# Patient Record
Sex: FEMALE | Race: WHITE | Employment: OTHER | URBAN - METROPOLITAN AREA
[De-identification: names, ages, dates, MRNs, and addresses within clinical notes are randomized per-mention and may not be internally consistent; named-entity substitution may affect disease eponyms.]

---

## 2017-02-07 DIAGNOSIS — D45 POLYCYTHEMIA VERA (HCC): ICD-10-CM

## 2017-04-05 DIAGNOSIS — D75.1 POLYCYTHEMIA: ICD-10-CM

## 2017-04-26 ENCOUNTER — OFFICE VISIT (OUTPATIENT)
Dept: ONCOLOGY | Age: 67
End: 2017-04-26
Payer: MEDICARE

## 2017-04-26 VITALS
HEART RATE: 71 BPM | WEIGHT: 218 LBS | RESPIRATION RATE: 16 BRPM | TEMPERATURE: 99 F | SYSTOLIC BLOOD PRESSURE: 156 MMHG | BODY MASS INDEX: 31.21 KG/M2 | DIASTOLIC BLOOD PRESSURE: 83 MMHG | HEIGHT: 70 IN

## 2017-04-26 DIAGNOSIS — D75.1 POLYCYTHEMIA: Primary | ICD-10-CM

## 2017-04-26 DIAGNOSIS — I27.20 PULMONARY HYPERTENSION (HCC): ICD-10-CM

## 2017-04-26 PROCEDURE — G8427 DOCREV CUR MEDS BY ELIG CLIN: HCPCS | Performed by: INTERNAL MEDICINE

## 2017-04-26 PROCEDURE — G8417 CALC BMI ABV UP PARAM F/U: HCPCS | Performed by: INTERNAL MEDICINE

## 2017-04-26 PROCEDURE — 4040F PNEUMOC VAC/ADMIN/RCVD: CPT | Performed by: INTERNAL MEDICINE

## 2017-04-26 PROCEDURE — 1036F TOBACCO NON-USER: CPT | Performed by: INTERNAL MEDICINE

## 2017-04-26 PROCEDURE — G8400 PT W/DXA NO RESULTS DOC: HCPCS | Performed by: INTERNAL MEDICINE

## 2017-04-26 PROCEDURE — 3017F COLORECTAL CA SCREEN DOC REV: CPT | Performed by: INTERNAL MEDICINE

## 2017-04-26 PROCEDURE — 99214 OFFICE O/P EST MOD 30 MIN: CPT | Performed by: INTERNAL MEDICINE

## 2017-04-26 PROCEDURE — 3014F SCREEN MAMMO DOC REV: CPT | Performed by: INTERNAL MEDICINE

## 2017-04-26 PROCEDURE — 1090F PRES/ABSN URINE INCON ASSESS: CPT | Performed by: INTERNAL MEDICINE

## 2017-04-26 PROCEDURE — 1123F ACP DISCUSS/DSCN MKR DOCD: CPT | Performed by: INTERNAL MEDICINE

## 2017-04-28 ENCOUNTER — HOSPITAL ENCOUNTER (OUTPATIENT)
Dept: BONE DENSITY | Age: 67
Discharge: HOME | End: 2017-04-28
Payer: MEDICARE

## 2017-04-28 DIAGNOSIS — Z78.0 POST-MENOPAUSAL: ICD-10-CM

## 2017-04-28 PROCEDURE — 77080 DXA BONE DENSITY AXIAL: CPT

## 2017-05-25 DIAGNOSIS — D45 POLYCYTHEMIA VERA (HCC): Primary | ICD-10-CM

## 2017-06-01 ENCOUNTER — HOSPITAL ENCOUNTER (OUTPATIENT)
Dept: INFUSION THERAPY | Age: 67
Discharge: HOME | End: 2017-06-01
Payer: MEDICARE

## 2017-06-01 VITALS
HEART RATE: 69 BPM | DIASTOLIC BLOOD PRESSURE: 76 MMHG | RESPIRATION RATE: 20 BRPM | TEMPERATURE: 98.4 F | SYSTOLIC BLOOD PRESSURE: 138 MMHG

## 2017-06-01 DIAGNOSIS — D45 POLYCYTHEMIA VERA (HCC): ICD-10-CM

## 2017-06-01 DIAGNOSIS — D75.1 POLYCYTHEMIA: Primary | ICD-10-CM

## 2017-06-01 PROCEDURE — 99195 PHLEBOTOMY: CPT

## 2017-06-01 NOTE — PROGRESS NOTES
1308 States requested to have phlebotomy performed due to symptoms of fatigue and headaches that she only gets when \"my blood is getting too thick\". Phlebotomy initiated with 16 gauge 1\" Gate2Play blood drawing kit per right antecubital vein. Water provided. Nurse remains with patient. 1319 500 ml blood collected. Needle removed. Pressure, then folded 2X2, paper tape and Co-Flex to site. No edema, redness, tenderness or drainage at site. Denies dizziness/lightheadedness or other discomfort. 1337 Up to bathroom to void. Stands without dizziness or lightheadedness. Gait steady. Consumed 3 glasses water while here. 1343 Instructed to drink extra fluids today. Understanding voiced. 1345 Released, ambulatory, in stable condition. FOZIA Harper RN

## 2017-06-01 NOTE — PLAN OF CARE
Problem: Fluid Volume - Risk of, Imbalanced  Goal: Other  To tolerate the phlebotomy with no signs or symptoms of fluid imbalance noted   Outcome: Met This Shift

## 2017-06-13 DIAGNOSIS — D75.1 POLYCYTHEMIA: ICD-10-CM

## 2017-06-30 ENCOUNTER — HOSPITAL ENCOUNTER (OUTPATIENT)
Dept: WOMENS IMAGING | Age: 67
Discharge: HOME | End: 2017-06-30
Payer: MEDICARE

## 2017-06-30 DIAGNOSIS — Z12.31 SCREENING MAMMOGRAM, ENCOUNTER FOR: ICD-10-CM

## 2017-06-30 PROCEDURE — G0202 SCR MAMMO BI INCL CAD: HCPCS

## 2017-07-25 DIAGNOSIS — D75.1 POLYCYTHEMIA: ICD-10-CM

## 2017-09-19 DIAGNOSIS — D75.1 POLYCYTHEMIA: ICD-10-CM

## 2017-10-16 ENCOUNTER — OFFICE VISIT (OUTPATIENT)
Dept: CARDIOLOGY | Age: 67
End: 2017-10-16
Payer: MEDICARE

## 2017-10-16 VITALS
WEIGHT: 221 LBS | HEART RATE: 76 BPM | BODY MASS INDEX: 31.64 KG/M2 | OXYGEN SATURATION: 97 % | SYSTOLIC BLOOD PRESSURE: 135 MMHG | RESPIRATION RATE: 18 BRPM | DIASTOLIC BLOOD PRESSURE: 77 MMHG | HEIGHT: 70 IN

## 2017-10-16 DIAGNOSIS — D75.1 POLYCYTHEMIA: ICD-10-CM

## 2017-10-16 DIAGNOSIS — I10 ESSENTIAL HYPERTENSION: ICD-10-CM

## 2017-10-16 DIAGNOSIS — I27.20 PULMONARY HYPERTENSION (HCC): ICD-10-CM

## 2017-10-16 DIAGNOSIS — R00.2 HEART PALPITATIONS: Primary | ICD-10-CM

## 2017-10-16 PROCEDURE — 4040F PNEUMOC VAC/ADMIN/RCVD: CPT | Performed by: FAMILY MEDICINE

## 2017-10-16 PROCEDURE — 3017F COLORECTAL CA SCREEN DOC REV: CPT | Performed by: FAMILY MEDICINE

## 2017-10-16 PROCEDURE — 1036F TOBACCO NON-USER: CPT | Performed by: FAMILY MEDICINE

## 2017-10-16 PROCEDURE — 99213 OFFICE O/P EST LOW 20 MIN: CPT | Performed by: FAMILY MEDICINE

## 2017-10-16 PROCEDURE — G8399 PT W/DXA RESULTS DOCUMENT: HCPCS | Performed by: FAMILY MEDICINE

## 2017-10-16 PROCEDURE — 3014F SCREEN MAMMO DOC REV: CPT | Performed by: FAMILY MEDICINE

## 2017-10-16 PROCEDURE — G8484 FLU IMMUNIZE NO ADMIN: HCPCS | Performed by: FAMILY MEDICINE

## 2017-10-16 PROCEDURE — 1123F ACP DISCUSS/DSCN MKR DOCD: CPT | Performed by: FAMILY MEDICINE

## 2017-10-16 PROCEDURE — 1090F PRES/ABSN URINE INCON ASSESS: CPT | Performed by: FAMILY MEDICINE

## 2017-10-16 PROCEDURE — G8427 DOCREV CUR MEDS BY ELIG CLIN: HCPCS | Performed by: FAMILY MEDICINE

## 2017-10-16 PROCEDURE — G8417 CALC BMI ABV UP PARAM F/U: HCPCS | Performed by: FAMILY MEDICINE

## 2017-10-16 PROCEDURE — 93000 ELECTROCARDIOGRAM COMPLETE: CPT | Performed by: FAMILY MEDICINE

## 2017-10-16 NOTE — PROGRESS NOTES
without mention of complication, not stated as uncontrolled     Undiagnosed cardiac murmurs     Unspecified essential hypertension     Unspecified hypothyroidism        CURRENT ALLERGIES: Seasonal and Tape [adhesive tape] REVIEW OF SYSTEMS: 10 systems were reviewed. Pertinent positives and negatives as above, all else negative.      Past Surgical History:   Procedure Laterality Date    CHOLECYSTECTOMY      1976    COLONOSCOPY  10/10/2016    -diverticulosis,hemorrhoids    HYSTERECTOMY      2002    LEG SURGERY      left and right laser surgery    Social History:  Social History   Substance Use Topics    Smoking status: Never Smoker    Smokeless tobacco: Not on file    Alcohol use Not on file        CURRENT MEDICATIONS:  Outpatient Prescriptions Marked as Taking for the 10/16/17 encounter (Office Visit) with Sherryle Hoit, MD   Medication Sig Dispense Refill    fluticasone (FLONASE) 50 MCG/ACT nasal spray USE TWO SPRAY(S) IN EACH NOSTRIL ONCE DAILY 1 Bottle 5    levothyroxine (SYNTHROID) 50 MCG tablet TAKE ONE TABLET BY MOUTH IN THE MORNING ON EMPTY STOMACH 90 tablet 3    amLODIPine (NORVASC) 10 MG tablet TAKE ONE TABLET BY MOUTH ONCE DAILY 90 tablet 3    losartan (COZAAR) 25 MG tablet Take 1 tablet by mouth daily 90 tablet 3    metFORMIN (GLUCOPHAGE) 500 MG tablet TAKE ONE TABLET BY MOUTH TWICE DAILY 180 tablet 3    metoprolol succinate (TOPROL XL) 100 MG extended release tablet TAKE ONE TABLET BY MOUTH ONCE DAILY 90 tablet 3    sertraline (ZOLOFT) 50 MG tablet TAKE 1 TABLET BY MOUTH DAILY 90 tablet 3    Chlorpheniramine Maleate (ALLERGY RELIEF PO) Take by mouth as needed      vitamin E 400 UNIT capsule Take 400 Units by mouth daily      vitamin D 1000 UNITS CAPS Take by mouth daily D3      Blood Glucose Monitoring Suppl (FREESTYLE LITE) KORY       Blood Glucose Monitoring Suppl (LORELEI CONTOUR MONITOR) W/DEVICE KIT 1 Device by Does not apply route daily 1 kit 0    Lancets MISC 1 each by

## 2017-10-25 ENCOUNTER — OFFICE VISIT (OUTPATIENT)
Dept: ONCOLOGY | Age: 67
End: 2017-10-25
Payer: MEDICARE

## 2017-10-25 VITALS
SYSTOLIC BLOOD PRESSURE: 148 MMHG | HEART RATE: 69 BPM | DIASTOLIC BLOOD PRESSURE: 90 MMHG | HEIGHT: 70 IN | TEMPERATURE: 99.4 F | BODY MASS INDEX: 32.35 KG/M2 | RESPIRATION RATE: 16 BRPM | WEIGHT: 226 LBS

## 2017-10-25 DIAGNOSIS — D75.1 POLYCYTHEMIA: Primary | ICD-10-CM

## 2017-10-25 DIAGNOSIS — G47.33 OSA (OBSTRUCTIVE SLEEP APNEA): ICD-10-CM

## 2017-10-25 PROCEDURE — 1036F TOBACCO NON-USER: CPT | Performed by: INTERNAL MEDICINE

## 2017-10-25 PROCEDURE — G8399 PT W/DXA RESULTS DOCUMENT: HCPCS | Performed by: INTERNAL MEDICINE

## 2017-10-25 PROCEDURE — 3017F COLORECTAL CA SCREEN DOC REV: CPT | Performed by: INTERNAL MEDICINE

## 2017-10-25 PROCEDURE — G8427 DOCREV CUR MEDS BY ELIG CLIN: HCPCS | Performed by: INTERNAL MEDICINE

## 2017-10-25 PROCEDURE — 1090F PRES/ABSN URINE INCON ASSESS: CPT | Performed by: INTERNAL MEDICINE

## 2017-10-25 PROCEDURE — 3014F SCREEN MAMMO DOC REV: CPT | Performed by: INTERNAL MEDICINE

## 2017-10-25 PROCEDURE — G8484 FLU IMMUNIZE NO ADMIN: HCPCS | Performed by: INTERNAL MEDICINE

## 2017-10-25 PROCEDURE — 4040F PNEUMOC VAC/ADMIN/RCVD: CPT | Performed by: INTERNAL MEDICINE

## 2017-10-25 PROCEDURE — 99214 OFFICE O/P EST MOD 30 MIN: CPT | Performed by: INTERNAL MEDICINE

## 2017-10-25 PROCEDURE — 1123F ACP DISCUSS/DSCN MKR DOCD: CPT | Performed by: INTERNAL MEDICINE

## 2017-10-25 PROCEDURE — G8417 CALC BMI ABV UP PARAM F/U: HCPCS | Performed by: INTERNAL MEDICINE

## 2017-10-25 NOTE — LETTER
Anil Peter MD    10/25/2017     Laina Richardson MD   [address] [facility] [address]  [place] [address]    Dear Adolfo Pace : Thank you for referring Lilly Anglin, 1950, to me for evaluation. Below are the relevant portions of my assessment and plan of care. Reason for the visit:   Chief Complaint   Patient presents with    Follow-up     polycythemia vera       Pertinent Clinical Problems/ Treatments:  1. Polycythemia, initially diagnosed with polycythemia vera, but further workup suggested other possibilities,  2. Diagnosed with pulmonary hypertension, possibly explaining her symptoms and elevated hematocrit  3. Further workup to exclude obstructive sleep apnea is underway  4. Treated with periodic phlebotomies and aspirin as the phlebotomies helped her symptoms    Summary of the case/HPI :    She is 23-year-old patient who was diagnosed in 2004 with polycythemia vera, she was symptomatically the time in terms of headache and fatigue. She has been treated with therapeutic phlebotomies as well as aspirin since that time. Patient did not have any major events like thrombosis or stroke and did not qualify for cytoreductive therapy like hydroxyurea  She has been doing relatively well with the phlebotomy every couple of months to keep hematocrit is than 45  Further workup showed negative Yeni Comanche 2 mutation and normal erythropoietin. Pulmonary workup suggested pulmonary hypertension. We organized sleep study and she didn't do it yet to exclude obstructive sleep apnea although this is suspected clinically. The patient phlebotomies helped significantly so we arranged for periodic phlebotomies when her hematocrit is above 45. Interim HX:    Patient is here today for routine follow-up visit. she has a standing order with CBC and periodic phlebotomies outside this office. Her hematocrit has been hanging around 45, she only required phlebotomy once this year. Allergies:  Seasonal and Tape [adhesive tape]        Review of Systems :      Constitutional: No fever or chills. No night sweats, no weight loss   Eyes: No eye discharge, double vision, or eye pain   HEENT: negative for sore mouth, sore throat, hoarseness and voice change   Respiratory: negative for cough , sputum, dyspnea, wheezing, hemoptysis, chest pain   Cardiovascular: negative for chest pain, dyspnea, palpitations, orthopnea, PND   Gastrointestinal: negative for nausea, vomiting, diarrhea, constipation, abdominal pain, Dysphagia, hematemesis and hematochezia   Genitourinary: negative for frequency, dysuria, nocturia, urinary incontinence, and hematuria   Integument: negative for rash, skin lesions, bruises.    Hematologic/Lymphatic: negative for easy bruising, bleeding, lymphadenopathy, petechiae and swelling/edema   Endocrine: negative for heat or cold intolerance, tremor, weight changes, change in bowel habits and hair loss   Musculoskeletal: negative for myalgias, arthralgias, pain, joint swelling,and bone pain   Neurological: negative for headaches, dizziness, seizures, weakness, numbness      Physical Examination :     BP (!) 148/90 (Site: Left Arm, Position: Sitting, Cuff Size: Medium Adult)   Pulse 69   Temp 99.4 °F (37.4 °C) (Temporal)   Resp 16   Ht 5' 10\" (1.778 m)   Wt 226 lb (102.5 kg)   BMI 32.43 kg/m²      Performance Status:Estimated performance status is ECOG 0    General appearance - well appearing, no in pain or distress   Mental status - alert and cooperative   Eyes - pupils equal and reactive, extraocular eye movements intact   Ears - bilateral TM's and external ear canals normal   Mouth - mucous membranes moist, pharynx normal without lesions   Neck - supple, no significant adenopathy ,trach is central   Lymphatics - no palpable lymphadenopathy, no hepatosplenomegaly   Chest - clear to auscultation, no wheezes, rales or rhonchi, symmetric air entry 5. We will see her back in 1 year for a follow-up      Cullen Babin MD  Hematologist/Medical [address] [facility]  Cell: [phone]                     If you have questions, please do not hesitate to call me. I look forward to following Fely Garcia along with you.     Sincerely,    Laura Díaz MD  Hematology/ Oncology  Cell [phone]

## 2017-10-25 NOTE — PROGRESS NOTES
Reason for the visit:   Chief Complaint   Patient presents with    Follow-up     polycythemia vera       Pertinent Clinical Problems/ Treatments:  1. Polycythemia, initially diagnosed with polycythemia vera, but further workup suggested other possibilities,  2. Diagnosed with pulmonary hypertension, possibly explaining her symptoms and elevated hematocrit  3. Further workup to exclude obstructive sleep apnea is underway  4. Treated with periodic phlebotomies and aspirin as the phlebotomies helped her symptoms    Summary of the case/HPI :    She is 79-year-old patient who was diagnosed in 2004 with polycythemia vera, she was symptomatically the time in terms of headache and fatigue. She has been treated with therapeutic phlebotomies as well as aspirin since that time. Patient did not have any major events like thrombosis or stroke and did not qualify for cytoreductive therapy like hydroxyurea  She has been doing relatively well with the phlebotomy every couple of months to keep hematocrit is than 45  Further workup showed negative Job Paola 2 mutation and normal erythropoietin. Pulmonary workup suggested pulmonary hypertension. We organized sleep study and she didn't do it yet to exclude obstructive sleep apnea although this is suspected clinically. The patient phlebotomies helped significantly so we arranged for periodic phlebotomies when her hematocrit is above 45. Interim HX:    Patient is here today for routine follow-up visit. she has a standing order with CBC and periodic phlebotomies outside this office. Her hematocrit has been hanging around 45, she only required phlebotomy once this year. As mentioned above, she was diagnosed with pulmonary hypertension. She is yet to do the sleep study because she is busy with her 's health issues. I reinforced the importance of sleep study. She promised to do it.        Past Medical History   has a past medical history of Allergic rhinitis due to other allergen; Depressive disorder, not elsewhere classified; Diabetes ([facility] [address] Diverticulosis; H/O echocardiogram; Hypertension; LAYO (obstructive sleep apnea); Other and unspecified hyperlipidemia; Other seborrheic keratosis; Peripheral vascular disease ([facility] [address] Polycythemia vera(238.4); Polycythemia vera(238.4); Supraventricular tachycardia ([facility] [address] Type II or unspecified type diabetes mellitus without mention of complication, not stated as uncontrolled; Type II or unspecified type diabetes mellitus without mention of complication, not stated as uncontrolled; Undiagnosed cardiac murmurs; Unspecified essential hypertension; and Unspecified hypothyroidism. Surgical History   has a past surgical history that includes Cholecystectomy; Hysterectomy; Leg Surgery; and Colonoscopy (10/10/2016). Family History  Family History   Problem Relation Age of Onset    Diabetes Mother     High Blood Pressure Mother     Stroke Mother     Cancer Father     Hearing Loss Father     High Blood Pressure Father        Home Medications  has a current medication list which includes the following prescription(s): fluticasone, levothyroxine, amlodipine, losartan, metformin, metoprolol succinate, atorvastatin, sertraline, chlorpheniramine maleate, vitamin e, vitamin d, lancets, buffered aspirin, garlic, [id number], evening primrose oil, therapeutic multivitamin-minerals, acai berry, calcium citrate-vitamin d, omega-3 fatty acids, flaxseed oil, freestyle lite, jeyson contour monitor, glucose blood vi test strips, and alprazolam, and the following Facility-Administered Medications: sodium chloride flush and sodium chloride (pf). Allergies:  Seasonal and Tape [adhesive tape]        Review of Systems :      Constitutional: No fever or chills.  No night sweats, no weight loss   Eyes: No eye discharge, double vision, or eye pain   HEENT: negative for sore mouth, sore throat, hoarseness and voice change   Respiratory: negative for cough , sputum, clubbing or cyanosis   Skin - normal coloration and turgor, no rashes, no suspicious skin lesions noted          Lab Results   Component Value Date    WBC 0-5 12/12/2016    HGB 14.7 12/02/2015    HCT 46.3 (H) 12/02/2015    MCV 87.5 12/02/2015     12/02/2015       Chemistry        Component Value Date/Time     12/12/2016 0943    K 3.9 12/12/2016 0943     12/12/2016 0943    CO2 32 12/12/2016 0943    BUN 14 12/12/2016 0943    CREATININE 0.5 12/12/2016 0943        Component Value Date/Time    CALCIUM 9.6 12/12/2016 0943    AST 23 05/04/2012 0930    ALT 26 05/04/2012 0930    BILITOT NEGATIVE 12/12/2016 0943        DARRIUS 2 is negative  erythropoietin level is 14   Previous records were reviewed, her erythropoietin was never suppressed. Assessment:    1. Polycythemia, the patient diagnoses of polycythemia vera is in question. Darrius 2 mutation was negative. I think it's more likely related to pulmonary hypertension possible sleep apnea   2. The patient is managed with periodic phlebotomies to help with her symptoms   3. Type 2 diabetes, hypertension, under fair control        Plan:     1. We'll continue with  periodic CBC and phlebotomy as they helped her symptoms. But we will decrease the frequency to once every 4 months . I'm not convinced that she has polycythemia vera. But she is insisting on the phlebotomies because the to help her symptoms with headache and fatigue   2. We will keep hematocrit less than 45  3. I stressed the importance of sleep study to exclude obstructive sleep apnea   4. Continue full dose aspirin  5.  We will see her back in 1 year for a follow-up      Cullen Babin MD  Hematologist/Medical Oncologist  [address] [facility]  Cell: [phone]

## 2017-11-27 DIAGNOSIS — D75.1 POLYCYTHEMIA: ICD-10-CM

## 2017-11-28 ENCOUNTER — HOSPITAL ENCOUNTER (OUTPATIENT)
Dept: INFUSION THERAPY | Age: 67
Discharge: HOME | End: 2017-11-28
Payer: MEDICARE

## 2017-11-28 VITALS
DIASTOLIC BLOOD PRESSURE: 69 MMHG | TEMPERATURE: 98.9 F | HEART RATE: 70 BPM | RESPIRATION RATE: 16 BRPM | SYSTOLIC BLOOD PRESSURE: 131 MMHG

## 2017-11-28 DIAGNOSIS — D45 POLYCYTHEMIA VERA (HCC): ICD-10-CM

## 2017-11-28 PROCEDURE — 99195 PHLEBOTOMY: CPT

## 2017-11-28 RX ORDER — 0.9 % SODIUM CHLORIDE 0.9 %
500 INTRAVENOUS SOLUTION INTRAVENOUS ONCE
Status: CANCELLED | OUTPATIENT
Start: 2017-11-28 | End: 2017-11-28

## 2017-11-28 RX ORDER — 0.9 % SODIUM CHLORIDE 0.9 %
250 INTRAVENOUS SOLUTION INTRAVENOUS ONCE
Status: CANCELLED | OUTPATIENT
Start: 2017-11-28 | End: 2017-11-28

## 2017-11-28 NOTE — PROGRESS NOTES
Pt arrived for scheduled phlebotomy. Phlebotomy started at 0807 used a 16G phlebotomy bag. Pt tolerated well. During procedure pt denies any light headedness. Procedure completed 4908 after removing 500ml of blood. Pt given ice water and encouraged to drink. Pt continues to state she is feeling fine and denies any dizziness or lightheadedness. 0818 130/77 P74  0823 137/83 P67  0828 146/82 P68  0832 Pt states she feels great, denies any dizziness or lightheadedness. Resp easy. Reminded pt to increase water consumption throughout the day. Pt verbalized understanding. Pt stood to leave and denies feeling dizzy.

## 2018-03-23 DIAGNOSIS — D75.1 POLYCYTHEMIA: ICD-10-CM

## 2018-03-28 ENCOUNTER — HOSPITAL ENCOUNTER (OUTPATIENT)
Dept: INFUSION THERAPY | Age: 68
Discharge: HOME | End: 2018-03-28
Payer: MEDICARE

## 2018-03-28 VITALS
TEMPERATURE: 97.8 F | RESPIRATION RATE: 16 BRPM | SYSTOLIC BLOOD PRESSURE: 140 MMHG | HEART RATE: 71 BPM | DIASTOLIC BLOOD PRESSURE: 99 MMHG

## 2018-03-28 DIAGNOSIS — D45 POLYCYTHEMIA VERA (HCC): ICD-10-CM

## 2018-03-28 PROCEDURE — 99195 PHLEBOTOMY: CPT

## 2018-03-28 RX ORDER — 0.9 % SODIUM CHLORIDE 0.9 %
250 INTRAVENOUS SOLUTION INTRAVENOUS ONCE
Status: CANCELLED | OUTPATIENT
Start: 2018-03-28 | End: 2018-03-28

## 2018-03-28 RX ORDER — 0.9 % SODIUM CHLORIDE 0.9 %
500 INTRAVENOUS SOLUTION INTRAVENOUS ONCE
Status: CANCELLED | OUTPATIENT
Start: 2018-03-28 | End: 2018-03-28

## 2018-03-28 NOTE — PROGRESS NOTES
0803--TJ=419/74  HR=67. Pt arrives ambulatory for therapeutic phlebotomy. c/o feeling tired and head-ache lately. 0007-- Phlebotomy performed to right antecubital vein with 500 ml blood obtained without difficulty over 10 minutes. Drinking water. 0815--KQ=822/64  HR=71. No c/o.  0825--ZA=947/73. HR=67. No c/o.  0835--LM=153/99 HR=71. Denies any dizziness or problems. Ambulates to bathroom. 0840--Discharged ambulatory in stable condition. Encouraged to drink extra fluids today.

## 2018-05-09 ENCOUNTER — OFFICE VISIT (OUTPATIENT)
Dept: VASCULAR SURGERY | Age: 68
End: 2018-05-09
Payer: MEDICARE

## 2018-05-09 VITALS
SYSTOLIC BLOOD PRESSURE: 153 MMHG | TEMPERATURE: 98 F | WEIGHT: 225 LBS | DIASTOLIC BLOOD PRESSURE: 92 MMHG | HEIGHT: 70 IN | BODY MASS INDEX: 32.21 KG/M2 | HEART RATE: 62 BPM | RESPIRATION RATE: 17 BRPM

## 2018-05-09 DIAGNOSIS — I87.2 VENOUS INSUFFICIENCY OF BOTH LOWER EXTREMITIES: Primary | ICD-10-CM

## 2018-05-09 DIAGNOSIS — I83.893 VARICOSE VEINS OF BILATERAL LOWER EXTREMITIES WITH OTHER COMPLICATIONS: ICD-10-CM

## 2018-05-09 PROCEDURE — 99203 OFFICE O/P NEW LOW 30 MIN: CPT | Performed by: INTERNAL MEDICINE

## 2018-05-09 PROCEDURE — 1036F TOBACCO NON-USER: CPT | Performed by: INTERNAL MEDICINE

## 2018-05-09 PROCEDURE — G8427 DOCREV CUR MEDS BY ELIG CLIN: HCPCS | Performed by: INTERNAL MEDICINE

## 2018-05-09 PROCEDURE — G8399 PT W/DXA RESULTS DOCUMENT: HCPCS | Performed by: INTERNAL MEDICINE

## 2018-05-09 PROCEDURE — 1123F ACP DISCUSS/DSCN MKR DOCD: CPT | Performed by: INTERNAL MEDICINE

## 2018-05-09 PROCEDURE — 3017F COLORECTAL CA SCREEN DOC REV: CPT | Performed by: INTERNAL MEDICINE

## 2018-05-09 PROCEDURE — 1090F PRES/ABSN URINE INCON ASSESS: CPT | Performed by: INTERNAL MEDICINE

## 2018-05-09 PROCEDURE — 4040F PNEUMOC VAC/ADMIN/RCVD: CPT | Performed by: INTERNAL MEDICINE

## 2018-05-09 PROCEDURE — G8417 CALC BMI ABV UP PARAM F/U: HCPCS | Performed by: INTERNAL MEDICINE

## 2018-05-21 ENCOUNTER — HOSPITAL ENCOUNTER (OUTPATIENT)
Dept: VASCULAR LAB | Age: 68
Discharge: HOME | End: 2018-05-23
Payer: MEDICARE

## 2018-05-21 DIAGNOSIS — I83.893 VARICOSE VEINS OF BILATERAL LOWER EXTREMITIES WITH OTHER COMPLICATIONS: ICD-10-CM

## 2018-05-21 DIAGNOSIS — I87.2 VENOUS INSUFFICIENCY OF BOTH LOWER EXTREMITIES: ICD-10-CM

## 2018-05-21 PROCEDURE — 93970 EXTREMITY STUDY: CPT

## 2018-05-24 ENCOUNTER — TELEPHONE (OUTPATIENT)
Dept: VASCULAR SURGERY | Age: 68
End: 2018-05-24

## 2018-05-24 DIAGNOSIS — I83.893 VARICOSE VEINS OF BILATERAL LOWER EXTREMITIES WITH OTHER COMPLICATIONS: Primary | ICD-10-CM

## 2018-06-06 ENCOUNTER — HOSPITAL ENCOUNTER (OUTPATIENT)
Dept: INTERVENTIONAL RADIOLOGY/VASCULAR | Age: 68
Discharge: HOME | End: 2018-06-08
Payer: MEDICARE

## 2018-06-06 VITALS
RESPIRATION RATE: 16 BRPM | HEART RATE: 57 BPM | DIASTOLIC BLOOD PRESSURE: 85 MMHG | OXYGEN SATURATION: 94 % | SYSTOLIC BLOOD PRESSURE: 153 MMHG

## 2018-06-06 DIAGNOSIS — I83.813 VARICOSE VEINS OF BILATERAL LOWER EXTREMITIES WITH PAIN: ICD-10-CM

## 2018-06-06 DIAGNOSIS — I87.2 VENOUS INSUFFICIENCY OF BOTH LOWER EXTREMITIES: ICD-10-CM

## 2018-06-06 DIAGNOSIS — I83.893 VARICOSE VEINS OF BILATERAL LOWER EXTREMITIES WITH OTHER COMPLICATIONS: Primary | ICD-10-CM

## 2018-06-06 PROCEDURE — 36470 NJX SCLRSNT 1 INCMPTNT VEIN: CPT | Performed by: RADIOLOGY

## 2018-06-06 PROCEDURE — 2500000003 HC RX 250 WO HCPCS: Performed by: INTERNAL MEDICINE

## 2018-06-06 PROCEDURE — 76942 ECHO GUIDE FOR BIOPSY: CPT | Performed by: RADIOLOGY

## 2018-06-06 RX ORDER — LIDOCAINE HYDROCHLORIDE 20 MG/ML
INJECTION, SOLUTION INFILTRATION; PERINEURAL
Status: COMPLETED | OUTPATIENT
Start: 2018-06-06 | End: 2018-06-06

## 2018-06-06 RX ORDER — SODIUM TETRADECYL SULFATE 30 MG/ML
INJECTION, SOLUTION INTRAVENOUS
Status: COMPLETED | OUTPATIENT
Start: 2018-06-06 | End: 2018-06-06

## 2018-06-06 RX ADMIN — LIDOCAINE HYDROCHLORIDE 2 ML: 20 INJECTION, SOLUTION INFILTRATION; PERINEURAL at 11:59

## 2018-06-06 RX ADMIN — TETRADECYL HYDROGEN SULFATE (ESTER) 2 ML: 30 INJECTION, SOLUTION INTRAVENOUS at 12:34

## 2018-06-13 ENCOUNTER — HOSPITAL ENCOUNTER (OUTPATIENT)
Dept: INTERVENTIONAL RADIOLOGY/VASCULAR | Age: 68
Discharge: HOME | End: 2018-06-15
Payer: MEDICARE

## 2018-06-13 VITALS
SYSTOLIC BLOOD PRESSURE: 156 MMHG | HEART RATE: 58 BPM | DIASTOLIC BLOOD PRESSURE: 80 MMHG | RESPIRATION RATE: 16 BRPM | OXYGEN SATURATION: 98 %

## 2018-06-13 DIAGNOSIS — I83.893 VARICOSE VEINS OF BILATERAL LOWER EXTREMITIES WITH OTHER COMPLICATIONS: ICD-10-CM

## 2018-06-13 PROCEDURE — 2500000003 HC RX 250 WO HCPCS: Performed by: RADIOLOGY

## 2018-06-13 PROCEDURE — 36470 NJX SCLRSNT 1 INCMPTNT VEIN: CPT | Performed by: RADIOLOGY

## 2018-06-13 PROCEDURE — 2500000003 HC RX 250 WO HCPCS: Performed by: INTERNAL MEDICINE

## 2018-06-13 PROCEDURE — 76942 ECHO GUIDE FOR BIOPSY: CPT | Performed by: RADIOLOGY

## 2018-06-13 RX ORDER — LIDOCAINE HYDROCHLORIDE 10 MG/ML
INJECTION, SOLUTION EPIDURAL; INFILTRATION; INTRACAUDAL; PERINEURAL
Status: COMPLETED | OUTPATIENT
Start: 2018-06-13 | End: 2018-06-13

## 2018-06-13 RX ORDER — SODIUM TETRADECYL SULFATE 30 MG/ML
INJECTION, SOLUTION INTRAVENOUS
Status: COMPLETED | OUTPATIENT
Start: 2018-06-13 | End: 2018-06-13

## 2018-06-13 RX ADMIN — LIDOCAINE HYDROCHLORIDE 1 ML: 10 INJECTION, SOLUTION EPIDURAL; INFILTRATION; INTRACAUDAL; PERINEURAL at 15:33

## 2018-06-13 RX ADMIN — LIDOCAINE HYDROCHLORIDE 1 ML: 10 INJECTION, SOLUTION EPIDURAL; INFILTRATION; INTRACAUDAL; PERINEURAL at 15:11

## 2018-06-13 RX ADMIN — TETRADECYL HYDROGEN SULFATE (ESTER) 7 ML: 30 INJECTION, SOLUTION INTRAVENOUS at 15:18

## 2018-06-13 RX ADMIN — LIDOCAINE HYDROCHLORIDE 1 ML: 10 INJECTION, SOLUTION EPIDURAL; INFILTRATION; INTRACAUDAL; PERINEURAL at 15:41

## 2018-06-13 RX ADMIN — LIDOCAINE HYDROCHLORIDE 1 ML: 10 INJECTION, SOLUTION EPIDURAL; INFILTRATION; INTRACAUDAL; PERINEURAL at 15:23

## 2018-06-13 RX ADMIN — LIDOCAINE HYDROCHLORIDE 1 ML: 10 INJECTION, SOLUTION EPIDURAL; INFILTRATION; INTRACAUDAL; PERINEURAL at 15:35

## 2018-06-27 ENCOUNTER — HOSPITAL ENCOUNTER (OUTPATIENT)
Dept: VASCULAR LAB | Age: 68
Discharge: HOME | End: 2018-06-29
Payer: MEDICARE

## 2018-06-27 DIAGNOSIS — I87.2 VENOUS INSUFFICIENCY OF BOTH LOWER EXTREMITIES: ICD-10-CM

## 2018-06-27 DIAGNOSIS — I83.813 VARICOSE VEINS OF BILATERAL LOWER EXTREMITIES WITH PAIN: ICD-10-CM

## 2018-06-27 PROCEDURE — 93970 EXTREMITY STUDY: CPT

## 2018-07-23 DIAGNOSIS — D75.1 POLYCYTHEMIA: ICD-10-CM

## 2018-07-23 DIAGNOSIS — G47.33 OSA (OBSTRUCTIVE SLEEP APNEA): ICD-10-CM

## 2018-08-22 ENCOUNTER — OFFICE VISIT (OUTPATIENT)
Dept: VASCULAR SURGERY | Age: 68
End: 2018-08-22
Payer: MEDICARE

## 2018-08-22 VITALS
BODY MASS INDEX: 32.1 KG/M2 | TEMPERATURE: 98 F | HEIGHT: 70 IN | WEIGHT: 224.2 LBS | HEART RATE: 61 BPM | DIASTOLIC BLOOD PRESSURE: 87 MMHG | SYSTOLIC BLOOD PRESSURE: 136 MMHG | RESPIRATION RATE: 20 BRPM

## 2018-08-22 DIAGNOSIS — I87.2 VENOUS INSUFFICIENCY: Primary | ICD-10-CM

## 2018-08-22 PROCEDURE — 1101F PT FALLS ASSESS-DOCD LE1/YR: CPT | Performed by: INTERNAL MEDICINE

## 2018-08-22 PROCEDURE — G8399 PT W/DXA RESULTS DOCUMENT: HCPCS | Performed by: INTERNAL MEDICINE

## 2018-08-22 PROCEDURE — 1036F TOBACCO NON-USER: CPT | Performed by: INTERNAL MEDICINE

## 2018-08-22 PROCEDURE — 4040F PNEUMOC VAC/ADMIN/RCVD: CPT | Performed by: INTERNAL MEDICINE

## 2018-08-22 PROCEDURE — G8427 DOCREV CUR MEDS BY ELIG CLIN: HCPCS | Performed by: INTERNAL MEDICINE

## 2018-08-22 PROCEDURE — G8417 CALC BMI ABV UP PARAM F/U: HCPCS | Performed by: INTERNAL MEDICINE

## 2018-08-22 PROCEDURE — 99213 OFFICE O/P EST LOW 20 MIN: CPT | Performed by: INTERNAL MEDICINE

## 2018-08-22 PROCEDURE — 3017F COLORECTAL CA SCREEN DOC REV: CPT | Performed by: INTERNAL MEDICINE

## 2018-08-22 PROCEDURE — 1123F ACP DISCUSS/DSCN MKR DOCD: CPT | Performed by: INTERNAL MEDICINE

## 2018-08-22 PROCEDURE — 1090F PRES/ABSN URINE INCON ASSESS: CPT | Performed by: INTERNAL MEDICINE

## 2018-08-22 NOTE — PROGRESS NOTES
Toño Benoit was seen on 8/22/2018 for   Chief Complaint   Patient presents with   Kierra Sarah     Pt is a f/u Venous Insufficiency check up pt. . Pt denies lower leg pain, or numbness/throbbing,cold or hot sensations. Pt states to have \"Little bumps here\" (Pointing to inner right thigh). Pt wears support hose bilat to to groin daily. ..5/9/18 First recent vascular visit. PCP Dr. Jacob August. R leg GSV laser [place] [address], 6/2/14, followed by L 4/30/15 laser c stab phlebectomy. Had pain, swelling, no skin breakdown, hyperpigmentation. Initial concern was for dvt, but studies were neg. Mom had varicose veins. Not treated. On feet at dept store all day. Legs felt btr after laser, still wore stockings, has developed sx last year. Both legs had swelling and pain. Thigh veins prominent. UPDATE 8/22/18 oN 6/6/18 had LGSV foam, then 6/13/18 RGSV. 6/27/18 DUS showed LPTV DVT, with bilateral GSV occlusive thrombus. Still has L thigh varicosity patent. Legs feel better. Still wearing support stockings. On her feet a lot. Social History     Social History    Marital status:      Spouse name: N/A    Number of children: N/A    Years of education: N/A     Occupational History    Not on file. Social History Main Topics    Smoking status: Never Smoker    Smokeless tobacco: Never Used    Alcohol use No    Drug use: Unknown    Sexual activity: Not on file     Other Topics Concern    Not on file     Social History Narrative    No narrative on file     Family History   Problem Relation Age of Onset    Diabetes Mother     High Blood Pressure Mother     Stroke Mother     Cancer Father     Hearing Loss Father     High Blood Pressure Father      Past Medical History:   Diagnosis Date    Activity intolerance 06/06/2018    Allergic rhinitis due to other allergen     Depressive disorder, not elsewhere classified     Diabetes ([address]     Diverticulosis     H/O echocardiogram 09/07/2016    EF 55%. Take by mouth as needed      vitamin D 1000 UNITS CAPS Take by mouth daily D3      Garlic 946 MG TABS Take  by mouth daily.  Evening Primrose Oil 500 MG CAPS Take 1,000 mg by mouth daily       Multiple Vitamins-Minerals (THERAPEUTIC MULTIVITAMIN-MINERALS) tablet Take 1 tablet by mouth daily.  ALPRAZolam (XANAX) 0.25 MG tablet Take 0.25 mg by mouth 3 times daily as needed.  Acai Solorio 500 MG CAPS Take 1 tablet by mouth daily.  Calcium Citrate-Vitamin D (CITRACAL + D PO) Take by mouth daily       Omega-3 Fatty Acids (OMEGA 3 PO) Take 2 tablets by mouth daily.  FLAXSEED OIL 1 tablet by Does not apply route daily.  atorvastatin (LIPITOR) 10 MG tablet TAKE ONE TABLET BY MOUTH ONCE DAILY 90 tablet 3    fluticasone (FLONASE) 50 MCG/ACT nasal spray USE TWO SPRAY(S) IN EACH NOSTRIL ONCE DAILY 1 Bottle 5    vitamin E 400 UNIT capsule Take 400 Units by mouth daily      Blood Glucose Monitoring Suppl (FREESTYLE LITE) KORY       Blood Glucose Monitoring Suppl (LORELEI CONTOUR MONITOR) W/DEVICE KIT 1 Device by Does not apply route daily 1 kit 0    glucose blood VI test strips (LORELEI CONTOUR TEST) strip 1 each by In Vitro route daily As needed. 100 each 3    Lancets MISC 1 each by Does not apply route daily Type II diabetes mellitus 100 each 3    Coenzyme Q10 (COQ10) 50 MG CAPS Take  by mouth daily. No current facility-administered medications for this visit. Facility-Administered Medications Ordered in Other Visits   Medication Dose Route Frequency Provider Last Rate Last Dose    sodium chloride flush 0.9 % injection 10 mL  10 mL Intravenous PRN Serena Escamilla MD        sodium chloride (PF) 0.9 % injection 10 mL  10 mL Intravenous PRN Libby Pickard MD             Physical findings:  General- no acute distress, oriented  Skin- warm, dry, no skin breakdown or gangrene. Palpable superficial clotted vein right medial thigh.  No large varicosities detected left

## 2018-10-01 ENCOUNTER — HOSPITAL ENCOUNTER (OUTPATIENT)
Dept: WOMENS IMAGING | Age: 68
Discharge: HOME | End: 2018-10-03
Payer: MEDICARE

## 2018-10-01 DIAGNOSIS — Z12.31 SCREENING MAMMOGRAM, ENCOUNTER FOR: ICD-10-CM

## 2018-10-01 PROCEDURE — 77067 SCR MAMMO BI INCL CAD: CPT

## 2018-10-24 ENCOUNTER — OFFICE VISIT (OUTPATIENT)
Dept: ONCOLOGY | Age: 68
End: 2018-10-24
Payer: MEDICARE

## 2018-10-24 VITALS
TEMPERATURE: 99.2 F | DIASTOLIC BLOOD PRESSURE: 94 MMHG | HEART RATE: 67 BPM | HEIGHT: 70 IN | BODY MASS INDEX: 32.07 KG/M2 | WEIGHT: 224 LBS | SYSTOLIC BLOOD PRESSURE: 151 MMHG | RESPIRATION RATE: 18 BRPM

## 2018-10-24 DIAGNOSIS — D45 POLYCYTHEMIA VERA (HCC): Primary | ICD-10-CM

## 2018-10-24 PROCEDURE — G8417 CALC BMI ABV UP PARAM F/U: HCPCS | Performed by: INTERNAL MEDICINE

## 2018-10-24 PROCEDURE — 99214 OFFICE O/P EST MOD 30 MIN: CPT | Performed by: INTERNAL MEDICINE

## 2018-10-24 PROCEDURE — G8427 DOCREV CUR MEDS BY ELIG CLIN: HCPCS | Performed by: INTERNAL MEDICINE

## 2018-10-24 PROCEDURE — 1036F TOBACCO NON-USER: CPT | Performed by: INTERNAL MEDICINE

## 2018-10-24 PROCEDURE — 1090F PRES/ABSN URINE INCON ASSESS: CPT | Performed by: INTERNAL MEDICINE

## 2018-10-24 PROCEDURE — G8399 PT W/DXA RESULTS DOCUMENT: HCPCS | Performed by: INTERNAL MEDICINE

## 2018-10-24 PROCEDURE — 1123F ACP DISCUSS/DSCN MKR DOCD: CPT | Performed by: INTERNAL MEDICINE

## 2018-10-24 PROCEDURE — 1101F PT FALLS ASSESS-DOCD LE1/YR: CPT | Performed by: INTERNAL MEDICINE

## 2018-10-24 PROCEDURE — 3017F COLORECTAL CA SCREEN DOC REV: CPT | Performed by: INTERNAL MEDICINE

## 2018-10-24 PROCEDURE — 4040F PNEUMOC VAC/ADMIN/RCVD: CPT | Performed by: INTERNAL MEDICINE

## 2018-10-24 PROCEDURE — G8484 FLU IMMUNIZE NO ADMIN: HCPCS | Performed by: INTERNAL MEDICINE

## 2018-10-29 ENCOUNTER — OFFICE VISIT (OUTPATIENT)
Dept: CARDIOLOGY | Age: 68
End: 2018-10-29
Payer: MEDICARE

## 2018-10-29 VITALS
DIASTOLIC BLOOD PRESSURE: 75 MMHG | OXYGEN SATURATION: 97 % | HEART RATE: 64 BPM | SYSTOLIC BLOOD PRESSURE: 137 MMHG | BODY MASS INDEX: 32.5 KG/M2 | WEIGHT: 227 LBS | HEIGHT: 70 IN

## 2018-10-29 DIAGNOSIS — I07.1 MILD TRICUSPID REGURGITATION: ICD-10-CM

## 2018-10-29 DIAGNOSIS — I10 ESSENTIAL HYPERTENSION: ICD-10-CM

## 2018-10-29 DIAGNOSIS — E78.2 MIXED HYPERLIPIDEMIA: ICD-10-CM

## 2018-10-29 DIAGNOSIS — I34.0 MILD MITRAL REGURGITATION: ICD-10-CM

## 2018-10-29 DIAGNOSIS — I27.20 PULMONARY HYPERTENSION (HCC): ICD-10-CM

## 2018-10-29 DIAGNOSIS — I51.7 LEFT ATRIAL ENLARGEMENT: ICD-10-CM

## 2018-10-29 DIAGNOSIS — R00.2 INTERMITTENT PALPITATIONS: Primary | ICD-10-CM

## 2018-10-29 PROCEDURE — 3017F COLORECTAL CA SCREEN DOC REV: CPT | Performed by: FAMILY MEDICINE

## 2018-10-29 PROCEDURE — G8427 DOCREV CUR MEDS BY ELIG CLIN: HCPCS | Performed by: FAMILY MEDICINE

## 2018-10-29 PROCEDURE — G8484 FLU IMMUNIZE NO ADMIN: HCPCS | Performed by: FAMILY MEDICINE

## 2018-10-29 PROCEDURE — G8399 PT W/DXA RESULTS DOCUMENT: HCPCS | Performed by: FAMILY MEDICINE

## 2018-10-29 PROCEDURE — 1123F ACP DISCUSS/DSCN MKR DOCD: CPT | Performed by: FAMILY MEDICINE

## 2018-10-29 PROCEDURE — 1090F PRES/ABSN URINE INCON ASSESS: CPT | Performed by: FAMILY MEDICINE

## 2018-10-29 PROCEDURE — G8417 CALC BMI ABV UP PARAM F/U: HCPCS | Performed by: FAMILY MEDICINE

## 2018-10-29 PROCEDURE — 93000 ELECTROCARDIOGRAM COMPLETE: CPT | Performed by: FAMILY MEDICINE

## 2018-10-29 PROCEDURE — 1101F PT FALLS ASSESS-DOCD LE1/YR: CPT | Performed by: FAMILY MEDICINE

## 2018-10-29 PROCEDURE — 1036F TOBACCO NON-USER: CPT | Performed by: FAMILY MEDICINE

## 2018-10-29 PROCEDURE — 99213 OFFICE O/P EST LOW 20 MIN: CPT | Performed by: FAMILY MEDICINE

## 2018-10-29 PROCEDURE — 4040F PNEUMOC VAC/ADMIN/RCVD: CPT | Performed by: FAMILY MEDICINE

## 2018-10-29 NOTE — PROGRESS NOTES
Diabetes in her mother; Hearing Loss in her father; High Blood Pressure in her father and mother; Stroke in her mother. PHYSICAL EXAM:   /75 (Site: Left Upper Arm, Position: Sitting, Cuff Size: Medium Adult)   Pulse 64   Ht 5' 10\" (1.778 m)   Wt 227 lb (103 kg)   SpO2 97%   BMI 32.57 kg/m²  Body mass index is 32.57 kg/m². Constitutional: She is oriented to person, place, and time. She appears well-developed and well-nourished. In no acute distress. HEENT: Normocephalic and atraumatic. No JVD present. Carotid bruit is not present. No mass and no thyromegaly present. No lymphadenopathy present. Cardiovascular: Normal rate, regular rhythm, normal heart sounds and intact distal pulses. Exam reveals no gallop and no friction rub. A II/VI systolic murmur was heard at the second intercostal space of the heart without significant radiation. Pulmonary/Chest: Effort normal and breath sounds normal. No respiratory distress. She has no wheezes, rhonchi or rales. Abdominal: Soft, non-tender. Bowel sounds and aorta are normal. She exhibits no organomegaly, mass or bruit. Extremities: Trace edema present No cyanosis, or clubbing. Pulses are 2+ radial/carotid/dorsalis pedis and posterior tibial bilaterally. Neurological: She is alert and oriented to person, place, and time. No evidence of gross cranial nerve deficit. Coordination appeared normal.   Skin: Skin is warm and dry. There is no rash or diaphoresis. Psychiatric: She has a normal mood and affect.  Her speech is normal and behavior is normal.      MOST RECENT LABS ON RECORD:   Lab Results   Component Value Date    WBC 0 04/17/2018    HGB 14.7 12/02/2015    HCT 46.3 (H) 12/02/2015     12/02/2015    CHOL 172 08/27/2018    TRIG 105 08/27/2018    HDL 47.5 08/27/2018    ALT 22 12/12/2017    AST 17 12/12/2017     04/17/2018    K 4.3 04/17/2018     04/17/2018    CREATININE 0.5 (L) 04/17/2018    BUN 19 04/17/2018    CO2 29 04/17/2018

## 2018-11-01 ENCOUNTER — HOSPITAL ENCOUNTER (OUTPATIENT)
Dept: INFUSION THERAPY | Age: 68
Discharge: HOME | End: 2018-11-01
Payer: MEDICARE

## 2018-11-01 VITALS — SYSTOLIC BLOOD PRESSURE: 147 MMHG | TEMPERATURE: 98.7 F | DIASTOLIC BLOOD PRESSURE: 81 MMHG | RESPIRATION RATE: 18 BRPM

## 2018-11-01 PROCEDURE — 99195 PHLEBOTOMY: CPT

## 2018-11-01 RX ORDER — 0.9 % SODIUM CHLORIDE 0.9 %
250 INTRAVENOUS SOLUTION INTRAVENOUS ONCE
Status: CANCELLED | OUTPATIENT
Start: 2018-11-01 | End: 2018-11-01

## 2018-11-01 RX ORDER — 0.9 % SODIUM CHLORIDE 0.9 %
500 INTRAVENOUS SOLUTION INTRAVENOUS ONCE
Status: CANCELLED | OUTPATIENT
Start: 2018-11-01 | End: 2018-11-01

## 2018-11-01 NOTE — PROGRESS NOTES
Patient here today for therapeutic phlebotomy ahead of scheduled time due to complaints of fatigue,headaches, dizziness and elevated blood pressure, no lab was performed prior to arrival.  08:06 Phlebotomy initiated into right antecubital site with closed system collection with attached 16 gauge needle, blood returned briskly. 08:21 500ml of blood obtained , phlebotomy completed and needle removed, site covered with folded 2x2 and wrapped with co-flex, patient tolerated well and states she feels better already. 08:24 125/77-74 two  Cups of water given and drank by patient. 08:29 407/45-31, continues to do well without comp;aints. 08:34 426/88-79, patient feels good and discharged home, bottle of water given and instructed to force fluids.

## 2019-02-19 ENCOUNTER — OFFICE VISIT (OUTPATIENT)
Dept: VASCULAR SURGERY | Age: 69
End: 2019-02-19
Payer: MEDICARE

## 2019-02-19 VITALS
HEIGHT: 70 IN | WEIGHT: 227.7 LBS | DIASTOLIC BLOOD PRESSURE: 87 MMHG | SYSTOLIC BLOOD PRESSURE: 143 MMHG | TEMPERATURE: 98.3 F | BODY MASS INDEX: 32.6 KG/M2 | HEART RATE: 76 BPM | RESPIRATION RATE: 20 BRPM

## 2019-02-19 DIAGNOSIS — I87.2 VENOUS INSUFFICIENCY: Primary | ICD-10-CM

## 2019-02-19 DIAGNOSIS — I87.2 EDEMA OF LOWER LEG DUE TO PERIPHERAL VENOUS INSUFFICIENCY: ICD-10-CM

## 2019-02-19 DIAGNOSIS — R60.0 EDEMA OF LOWER LEG DUE TO PERIPHERAL VENOUS INSUFFICIENCY: ICD-10-CM

## 2019-02-19 PROCEDURE — 1090F PRES/ABSN URINE INCON ASSESS: CPT | Performed by: INTERNAL MEDICINE

## 2019-02-19 PROCEDURE — 1123F ACP DISCUSS/DSCN MKR DOCD: CPT | Performed by: INTERNAL MEDICINE

## 2019-02-19 PROCEDURE — G8399 PT W/DXA RESULTS DOCUMENT: HCPCS | Performed by: INTERNAL MEDICINE

## 2019-02-19 PROCEDURE — G8417 CALC BMI ABV UP PARAM F/U: HCPCS | Performed by: INTERNAL MEDICINE

## 2019-02-19 PROCEDURE — G8427 DOCREV CUR MEDS BY ELIG CLIN: HCPCS | Performed by: INTERNAL MEDICINE

## 2019-02-19 PROCEDURE — 99213 OFFICE O/P EST LOW 20 MIN: CPT | Performed by: INTERNAL MEDICINE

## 2019-02-19 PROCEDURE — 3017F COLORECTAL CA SCREEN DOC REV: CPT | Performed by: INTERNAL MEDICINE

## 2019-02-19 PROCEDURE — 4040F PNEUMOC VAC/ADMIN/RCVD: CPT | Performed by: INTERNAL MEDICINE

## 2019-02-19 PROCEDURE — G8484 FLU IMMUNIZE NO ADMIN: HCPCS | Performed by: INTERNAL MEDICINE

## 2019-02-19 PROCEDURE — 1101F PT FALLS ASSESS-DOCD LE1/YR: CPT | Performed by: INTERNAL MEDICINE

## 2019-02-19 PROCEDURE — 1036F TOBACCO NON-USER: CPT | Performed by: INTERNAL MEDICINE

## 2019-02-19 RX ORDER — ROSUVASTATIN CALCIUM 5 MG/1
5 TABLET, COATED ORAL DAILY
COMMUNITY
End: 2019-03-01 | Stop reason: SDUPTHER

## 2019-02-26 DIAGNOSIS — D45 POLYCYTHEMIA VERA (HCC): ICD-10-CM

## 2019-02-27 ENCOUNTER — HOSPITAL ENCOUNTER (OUTPATIENT)
Dept: VASCULAR LAB | Age: 69
Discharge: HOME | End: 2019-03-01
Payer: MEDICARE

## 2019-02-27 DIAGNOSIS — I87.2 VENOUS INSUFFICIENCY: ICD-10-CM

## 2019-02-27 DIAGNOSIS — R60.0 EDEMA OF LOWER LEG DUE TO PERIPHERAL VENOUS INSUFFICIENCY: ICD-10-CM

## 2019-02-27 DIAGNOSIS — I87.2 EDEMA OF LOWER LEG DUE TO PERIPHERAL VENOUS INSUFFICIENCY: ICD-10-CM

## 2019-02-27 DIAGNOSIS — D45 POLYCYTHEMIA VERA (HCC): ICD-10-CM

## 2019-02-27 PROCEDURE — 93971 EXTREMITY STUDY: CPT

## 2019-03-01 ENCOUNTER — HOSPITAL ENCOUNTER (OUTPATIENT)
Dept: INFUSION THERAPY | Age: 69
Discharge: HOME | End: 2019-03-01
Payer: MEDICARE

## 2019-03-01 VITALS
DIASTOLIC BLOOD PRESSURE: 90 MMHG | SYSTOLIC BLOOD PRESSURE: 144 MMHG | HEART RATE: 70 BPM | TEMPERATURE: 98.2 F | RESPIRATION RATE: 18 BRPM

## 2019-03-01 DIAGNOSIS — D45 POLYCYTHEMIA VERA (HCC): ICD-10-CM

## 2019-03-01 PROCEDURE — 99195 PHLEBOTOMY: CPT

## 2019-03-01 RX ORDER — 0.9 % SODIUM CHLORIDE 0.9 %
500 INTRAVENOUS SOLUTION INTRAVENOUS ONCE
Status: DISCONTINUED | OUTPATIENT
Start: 2019-03-01 | End: 2019-03-02 | Stop reason: HOSPADM

## 2019-03-01 RX ORDER — 0.9 % SODIUM CHLORIDE 0.9 %
500 INTRAVENOUS SOLUTION INTRAVENOUS ONCE
Status: CANCELLED | OUTPATIENT
Start: 2019-03-01 | End: 2019-03-01

## 2019-03-01 RX ORDER — 0.9 % SODIUM CHLORIDE 0.9 %
250 INTRAVENOUS SOLUTION INTRAVENOUS ONCE
Status: DISCONTINUED | OUTPATIENT
Start: 2019-03-01 | End: 2019-03-02 | Stop reason: HOSPADM

## 2019-03-01 RX ORDER — 0.9 % SODIUM CHLORIDE 0.9 %
250 INTRAVENOUS SOLUTION INTRAVENOUS ONCE
Status: CANCELLED | OUTPATIENT
Start: 2019-03-01 | End: 2019-03-01

## 2019-03-01 NOTE — PROGRESS NOTES
Patient here for scheduled phlebotomy. 08:07 Phlebotomy initiated into right antecubital vein with 16 gauge needle to closed phlebotomy system with blood returning briskly. 08:23 500ml 0f blood obtained and phlebotomy stopped, needle removed and site covered with dressing. 08:25 first eight cup of water taken. 08:28 782/56-70.  08:33 Second cup of water taken, 143/94-72.  08:38 219/20-07.  08:40 Patient feels good and ready for discharge, instructed to force fluids and rest today. 08:43 847/19-81 patient denies any problems.

## 2019-03-06 ENCOUNTER — TELEPHONE (OUTPATIENT)
Dept: VASCULAR SURGERY | Age: 69
End: 2019-03-06

## 2019-03-06 DIAGNOSIS — I87.2 EDEMA OF LOWER LEG DUE TO PERIPHERAL VENOUS INSUFFICIENCY: ICD-10-CM

## 2019-03-06 DIAGNOSIS — I87.2 VENOUS INSUFFICIENCY: Primary | ICD-10-CM

## 2019-03-06 DIAGNOSIS — R60.0 EDEMA OF LOWER LEG DUE TO PERIPHERAL VENOUS INSUFFICIENCY: ICD-10-CM

## 2019-03-13 ENCOUNTER — TELEPHONE (OUTPATIENT)
Dept: VASCULAR SURGERY | Age: 69
End: 2019-03-13

## 2019-03-13 DIAGNOSIS — R60.0 EDEMA OF LOWER LEG DUE TO PERIPHERAL VENOUS INSUFFICIENCY: Primary | ICD-10-CM

## 2019-03-13 DIAGNOSIS — I87.2 VENOUS INSUFFICIENCY: ICD-10-CM

## 2019-03-13 DIAGNOSIS — I87.2 EDEMA OF LOWER LEG DUE TO PERIPHERAL VENOUS INSUFFICIENCY: Primary | ICD-10-CM

## 2019-03-19 ENCOUNTER — TELEPHONE (OUTPATIENT)
Dept: VASCULAR SURGERY | Age: 69
End: 2019-03-19

## 2019-03-19 DIAGNOSIS — I83.893 VARICOSE VEINS OF LOWER EXTREMITIES WITH COMPLICATIONS, BILATERAL: Primary | ICD-10-CM

## 2019-03-20 ENCOUNTER — HOSPITAL ENCOUNTER (OUTPATIENT)
Dept: INTERVENTIONAL RADIOLOGY/VASCULAR | Age: 69
Discharge: HOME | End: 2019-03-20
Attending: RADIOLOGY | Admitting: INTERNAL MEDICINE
Payer: MEDICARE

## 2019-03-20 ENCOUNTER — APPOINTMENT (OUTPATIENT)
Dept: VASCULAR LAB | Age: 69
End: 2019-03-20
Attending: RADIOLOGY
Payer: MEDICARE

## 2019-03-20 ENCOUNTER — TELEPHONE (OUTPATIENT)
Dept: VASCULAR SURGERY | Age: 69
End: 2019-03-20

## 2019-03-20 VITALS
OXYGEN SATURATION: 94 % | HEART RATE: 67 BPM | SYSTOLIC BLOOD PRESSURE: 159 MMHG | DIASTOLIC BLOOD PRESSURE: 87 MMHG | RESPIRATION RATE: 16 BRPM | TEMPERATURE: 97.7 F

## 2019-03-20 DIAGNOSIS — I83.893 VARICOSE VEINS OF LOWER EXTREMITIES WITH COMPLICATIONS, BILATERAL: ICD-10-CM

## 2019-03-20 DIAGNOSIS — I83.893 VARICOSE VEINS OF LOWER EXTREMITIES WITH COMPLICATIONS, BILATERAL: Primary | ICD-10-CM

## 2019-03-20 PROCEDURE — 36470 NJX SCLRSNT 1 INCMPTNT VEIN: CPT | Performed by: INTERNAL MEDICINE

## 2019-03-20 PROCEDURE — 2500000003 HC RX 250 WO HCPCS: Performed by: INTERNAL MEDICINE

## 2019-03-20 PROCEDURE — 2709999900 IR US GUIDED NEEDLE PLACEMENT

## 2019-03-20 PROCEDURE — 93971 EXTREMITY STUDY: CPT

## 2019-03-20 PROCEDURE — 76942 ECHO GUIDE FOR BIOPSY: CPT | Performed by: INTERNAL MEDICINE

## 2019-03-20 RX ADMIN — POLIDOCANOL 2 ML: 0.01 INJECTION, SOLUTION INTRAVENOUS at 13:22

## 2019-03-20 RX ADMIN — POLIDOCANOL 2 ML: 0.01 INJECTION, SOLUTION INTRAVENOUS at 13:16

## 2019-03-27 ENCOUNTER — HOSPITAL ENCOUNTER (OUTPATIENT)
Dept: VASCULAR LAB | Age: 69
Discharge: HOME | End: 2019-03-29
Payer: MEDICARE

## 2019-03-27 DIAGNOSIS — I83.893 VARICOSE VEINS OF LOWER EXTREMITIES WITH COMPLICATIONS, BILATERAL: ICD-10-CM

## 2019-03-27 PROCEDURE — 93970 EXTREMITY STUDY: CPT

## 2019-04-03 ENCOUNTER — OFFICE VISIT (OUTPATIENT)
Dept: VASCULAR SURGERY | Age: 69
End: 2019-04-03
Payer: MEDICARE

## 2019-04-03 VITALS
RESPIRATION RATE: 20 BRPM | BODY MASS INDEX: 32.69 KG/M2 | DIASTOLIC BLOOD PRESSURE: 80 MMHG | SYSTOLIC BLOOD PRESSURE: 129 MMHG | WEIGHT: 228.3 LBS | HEIGHT: 70 IN | HEART RATE: 75 BPM | TEMPERATURE: 99.3 F

## 2019-04-03 DIAGNOSIS — I87.2 VENOUS INSUFFICIENCY: Primary | ICD-10-CM

## 2019-04-03 PROCEDURE — G8417 CALC BMI ABV UP PARAM F/U: HCPCS | Performed by: INTERNAL MEDICINE

## 2019-04-03 PROCEDURE — G8399 PT W/DXA RESULTS DOCUMENT: HCPCS | Performed by: INTERNAL MEDICINE

## 2019-04-03 PROCEDURE — 4040F PNEUMOC VAC/ADMIN/RCVD: CPT | Performed by: INTERNAL MEDICINE

## 2019-04-03 PROCEDURE — 99213 OFFICE O/P EST LOW 20 MIN: CPT | Performed by: INTERNAL MEDICINE

## 2019-04-03 PROCEDURE — 1090F PRES/ABSN URINE INCON ASSESS: CPT | Performed by: INTERNAL MEDICINE

## 2019-04-03 PROCEDURE — 3017F COLORECTAL CA SCREEN DOC REV: CPT | Performed by: INTERNAL MEDICINE

## 2019-04-03 PROCEDURE — G8427 DOCREV CUR MEDS BY ELIG CLIN: HCPCS | Performed by: INTERNAL MEDICINE

## 2019-04-03 PROCEDURE — 1036F TOBACCO NON-USER: CPT | Performed by: INTERNAL MEDICINE

## 2019-04-03 PROCEDURE — 1123F ACP DISCUSS/DSCN MKR DOCD: CPT | Performed by: INTERNAL MEDICINE

## 2019-04-03 RX ORDER — ROSUVASTATIN CALCIUM 5 MG/1
5 TABLET, COATED ORAL DAILY
COMMUNITY
End: 2019-10-18

## 2019-04-03 NOTE — PROGRESS NOTES
Lu Meyer was seen on 4/3/2019 for   Chief Complaint   Patient presents with    Post-Op Check     Pt states \"There's just a little bit of pain that comes and goes, I think it's just healing. \" \"There's a little bit of bruising, but it's fading away too. \" Pt denies any swelling of left leg. Pt denies any hot/cold/numb/tingling sensations of left leg. .  .5/9/18 First recent vascular visit. PCP Dr. Dontrell Villela. R leg GSV laser [address] [facility], 6/2/14, followed by L 4/30/15 laser c stab phlebectomy. Had pain, swelling, no skin breakdown, hyperpigmentation. Initial concern was for dvt, but studies were neg. Mom had varicose veins. Not treated. On feet at dept store all day. Legs felt btr after laser, still wore stockings, has developed sx last year. Both legs had swelling and pain. Thigh veins prominent. UPDATE 8/22/18 oN 6/6/18 had LGSV foam, then 6/13/18 RGSV. 6/27/18 DUS showed LPTV DVT, with bilateral GSV occlusive thrombus. Still has L thigh varicosity patent. Legs feel better. Still wearing support stockings. On her feet a lot. UPDATE 2/19/19 Right leg is much better. Has developed a small thigh varicosity which is mildly tender. Left leg had residual branch which was not sclerosed on 1st procedure, with possible plan to address later, probably with foam. Still has some thigh pain. UPDATE 4/3/19 Since last visit had 3/20/19 left lateral thigh polidocanol foam therapy using butterfly needle. Mild soreness post procedure with hyperpigmentation. Duplex 3/27/19 showed that veins in lateral left thigh and in knee area are clotted with no DVT.       Social History     Socioeconomic History    Marital status:      Spouse name: Not on file    Number of children: Not on file    Years of education: Not on file    Highest education level: Not on file   Occupational History    Not on file   Social Needs    Financial resource strain: Not on file    Food insecurity:     Worry: Not on file Right leg. Had ablation done 6/2014. 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 96/76/0139    Varicose veins of left lower extremity with pain 06/06/2018    Venous (peripheral) insufficiency 06/06/2018    Venous insufficiency of both lower extremities 06/06/2018     Current Outpatient Medications   Medication Sig Dispense Refill    rosuvastatin (CRESTOR) 5 MG tablet Take 5 mg by mouth daily      metoprolol succinate (TOPROL XL) 100 MG extended release tablet TAKE ONE TABLET BY MOUTH ONCE DAILY 90 tablet 3    fluticasone (FLONASE) 50 MCG/ACT nasal spray USE TWO SPRAY(S) IN EACH NOSTRIL ONCE DAILY 16 Bottle 5    levothyroxine (SYNTHROID) 50 MCG tablet TAKE ONE TABLET BY MOUTH IN THE MORNING ON EMPTY STOMACH 90 tablet 3    aspirin 81 MG tablet Take 162 mg by mouth daily      losartan (COZAAR) 50 MG tablet Take 1 tablet by mouth daily 30 tablet 5    amLODIPine (NORVASC) 10 MG tablet TAKE ONE TABLET BY MOUTH ONCE DAILY 90 tablet 3    metFORMIN (GLUCOPHAGE) 500 MG tablet TAKE ONE TABLET BY MOUTH TWICE DAILY 180 tablet 3    Chlorpheniramine Maleate (ALLERGY RELIEF PO) Take by mouth as needed      vitamin E 400 UNIT capsule Take 400 Units by mouth daily      vitamin D 1000 UNITS CAPS Take by mouth daily D3      Garlic 888 MG TABS Take  by mouth daily.  Coenzyme Q10 (COQ10) 50 MG CAPS Take  by mouth daily.       Evening Primrose Oil 500 MG CAPS Take 1,000 mg by mouth daily       Multiple Vitamins-Minerals (THERAPEUTIC MULTIVITAMIN-MINERALS) tablet Take 1 tablet by mouth daily.  ALPRAZolam (XANAX) 0.25 MG tablet Take 0.25 mg by mouth 3 times daily as needed.  Acai Solorio 500 MG CAPS Take 1 tablet by mouth daily.  Calcium Citrate-Vitamin D (CITRACAL + D PO) Take by mouth daily       Omega-3 Fatty Acids (OMEGA 3 PO) Take 2 tablets by mouth daily.  FLAXSEED OIL 1 tablet by Does not apply route daily.  rosuvastatin (CRESTOR) 5 MG tablet Take 1 tablet by mouth nightly 90 tablet 3    Blood Glucose Monitoring Suppl (FREESTYLE LITE) KORY       glucose blood VI test strips (LORELEI CONTOUR TEST) strip 1 each by In Vitro route daily As needed. 100 each 3    Lancets MISC 1 each by Does not apply route daily Type II diabetes mellitus 100 each 3     No current facility-administered medications for this visit. Facility-Administered Medications Ordered in Other Visits   Medication Dose Route Frequency Provider Last Rate Last Dose    sodium chloride flush 0.9 % injection 10 mL  10 mL Intravenous PRN Nasfat Oscar Rick MD        sodium chloride (PF) 0.9 % injection 10 mL  10 mL Intravenous PRN Keila Jackman MD             Physical findings:  General- no acute distress, oriented  HEENT - no xanthelasma, external ears normal  Neck- Supple, no thyromegaly  Skin- warm, dry, no skin breakdown or gangrene. Hyperpigmented veins lateral left thigh and knee area c/w effective sclerotherapy  Extremities - no edema        Assessment:  No diagnosis found. Successful bilateral sclerotherapy  Plan of care:  RTC 6 mo  Compression  Follow up and evaluate.        Electronically signed by Glen Calixto MD on 4/3/19 at 8:37 AM

## 2019-04-26 ENCOUNTER — HOSPITAL ENCOUNTER (OUTPATIENT)
Dept: GENERAL RADIOLOGY | Age: 69
Discharge: HOME | End: 2019-04-28
Payer: MEDICARE

## 2019-04-26 ENCOUNTER — HOSPITAL ENCOUNTER (OUTPATIENT)
Age: 69
Discharge: HOME | End: 2019-04-28
Payer: MEDICARE

## 2019-04-26 DIAGNOSIS — R05.9 COUGH: ICD-10-CM

## 2019-04-26 PROCEDURE — 71046 X-RAY EXAM CHEST 2 VIEWS: CPT

## 2019-05-07 ENCOUNTER — HOSPITAL ENCOUNTER (OUTPATIENT)
Dept: PULMONOLOGY | Age: 69
Discharge: HOME | End: 2019-05-07
Payer: MEDICARE

## 2019-05-07 DIAGNOSIS — R05.9 COUGH: ICD-10-CM

## 2019-05-07 PROCEDURE — 94726 PLETHYSMOGRAPHY LUNG VOLUMES: CPT

## 2019-05-07 PROCEDURE — 94060 EVALUATION OF WHEEZING: CPT

## 2019-05-07 PROCEDURE — 94664 DEMO&/EVAL PT USE INHALER: CPT

## 2019-05-07 PROCEDURE — 94729 DIFFUSING CAPACITY: CPT

## 2019-05-07 PROCEDURE — 6360000002 HC RX W HCPCS: Performed by: INTERNAL MEDICINE

## 2019-05-07 RX ORDER — ALBUTEROL SULFATE 2.5 MG/3ML
2.5 SOLUTION RESPIRATORY (INHALATION) ONCE
Status: COMPLETED | OUTPATIENT
Start: 2019-05-07 | End: 2019-05-07

## 2019-05-07 RX ADMIN — ALBUTEROL SULFATE 2.5 MG: 2.5 SOLUTION RESPIRATORY (INHALATION) at 16:21

## 2019-06-03 ENCOUNTER — OFFICE VISIT (OUTPATIENT)
Dept: PULMONOLOGY | Age: 69
End: 2019-06-03
Payer: MEDICARE

## 2019-06-03 VITALS
HEART RATE: 72 BPM | SYSTOLIC BLOOD PRESSURE: 155 MMHG | RESPIRATION RATE: 16 BRPM | HEIGHT: 70 IN | BODY MASS INDEX: 32.38 KG/M2 | OXYGEN SATURATION: 97 % | TEMPERATURE: 99.2 F | WEIGHT: 226.2 LBS | DIASTOLIC BLOOD PRESSURE: 90 MMHG

## 2019-06-03 DIAGNOSIS — G47.33 OSA (OBSTRUCTIVE SLEEP APNEA): ICD-10-CM

## 2019-06-03 DIAGNOSIS — R49.0 HOARSENESS OF VOICE: ICD-10-CM

## 2019-06-03 DIAGNOSIS — R05.3 CHRONIC COUGH: Primary | ICD-10-CM

## 2019-06-03 DIAGNOSIS — I82.412 DVT FEMORAL (DEEP VENOUS THROMBOSIS) WITH THROMBOPHLEBITIS, LEFT (HCC): ICD-10-CM

## 2019-06-03 DIAGNOSIS — R09.82 POST-NASAL DRIP: ICD-10-CM

## 2019-06-03 DIAGNOSIS — I50.32 CHRONIC DIASTOLIC (CONGESTIVE) HEART FAILURE (HCC): ICD-10-CM

## 2019-06-03 DIAGNOSIS — D45 POLYCYTHEMIA VERA (HCC): ICD-10-CM

## 2019-06-03 DIAGNOSIS — I27.20 PULMONARY HYPERTENSION (HCC): ICD-10-CM

## 2019-06-03 DIAGNOSIS — E66.09 OBESITY DUE TO EXCESS CALORIES, UNSPECIFIED OBESITY SEVERITY: ICD-10-CM

## 2019-06-03 PROCEDURE — G8427 DOCREV CUR MEDS BY ELIG CLIN: HCPCS | Performed by: INTERNAL MEDICINE

## 2019-06-03 PROCEDURE — G8417 CALC BMI ABV UP PARAM F/U: HCPCS | Performed by: INTERNAL MEDICINE

## 2019-06-03 PROCEDURE — 99205 OFFICE O/P NEW HI 60 MIN: CPT | Performed by: INTERNAL MEDICINE

## 2019-06-03 PROCEDURE — 1090F PRES/ABSN URINE INCON ASSESS: CPT | Performed by: INTERNAL MEDICINE

## 2019-06-03 RX ORDER — FLUTICASONE PROPIONATE 50 MCG
SPRAY, SUSPENSION (ML) NASAL
Qty: 1 BOTTLE | Refills: 5 | Status: SHIPPED | OUTPATIENT
Start: 2019-06-03 | End: 2020-08-31

## 2019-06-03 NOTE — PATIENT INSTRUCTIONS
SURVEY:    You may be receiving a survey from [employer] regarding your visit today. Please complete the survey to enable us to provide the highest quality of care to you and your family. If you cannot score us a very good on any question, please call the office to discuss how we could have made your experience a very good one. Thank you. Please recheck your blood pressure when you go home and make sure you take your prescribed medication if applicable . Please follow up with your PCP or ER if needed.

## 2019-06-04 ENCOUNTER — TELEPHONE (OUTPATIENT)
Dept: PULMONOLOGY | Age: 69
End: 2019-06-04

## 2019-06-04 NOTE — PROGRESS NOTES
PULMONARY OP CONSULTATION        REFERRED BY: Francy Trujillo MD    REASON FOR CONSULTATION:  Chronic cough, pulmonary hypertension and sleep apnea    HISTORY OF PRESENT ILLNESS:    Pancho Askew is a 80y.o. year old female here for evaluation of above problems. Patient has been having cough since December 2018, that has been improving lately. This cough has been associated with hoarseness of voice. Her cough is improved using Mucinex. Denied any shortness of breath or wheezing. No hemoptysis. No change with location or time. Previously may have had exposure to asbestos. Patient also was observed to have pulmonary hypertension on an echocardiogram.  Has increased risk for sleep apnea as evidenced by presence of snoring along with daytime sleepiness and fatigue and tiredness. Patient has not been evaluated for it in the past.  Has also polycythemia vera for which she gets treatment. Other review of systems is significant for having postnasal discharge      PAST MEDICAL HISTORY:       Diagnosis Date    Activity intolerance 06/06/2018    Allergic rhinitis due to other allergen     Depressive disorder, not elsewhere classified     Diabetes ([facility] [address]     Diverticulosis     H/O echocardiogram 09/07/2016    EF 55%. LV wall thickness is mildly increased. Left atrium is severely dilated (>40) left atrial volume index of 41 ml/m2. Mild mitral and treicuspid regurg. Moderate pulmonary hypertension estimated right ventricular systolic pressure of 42 mmHg. Moderate diastolic dysfunction is seen.  Hypertension     LAYO (obstructive sleep apnea) 12/14/2015    Other and unspecified hyperlipidemia     Other seborrheic keratosis     Pain in lower limb 06/06/2018    Peripheral vascular disease (HCC)     Right leg. Had ablation done 6/2014.    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 97/59/0610    Varicose veins of left lower extremity with pain 06/06/2018    Venous (peripheral) insufficiency 06/06/2018    Venous insufficiency of both lower extremities 06/06/2018       SURGICAL HISTORY:    Past Surgical History:   Procedure Laterality Date    CHOLECYSTECTOMY      1976    COLONOSCOPY  10/10/2016    -diverticulosis,hemorrhoids    HYSTERECTOMY      2002    LEG SURGERY      left and right laser surgery    OTHER SURGICAL HISTORY Left 06/06/2018    sclerotherapy/Dr Nguyen/"[employer]fin    OTHER SURGICAL HISTORY Left 03/20/2019    DR Gomez/"[employer]fin/sclerotherapy with Asclera       ALLERGIES:    Allergies   Allergen Reactions    Seasonal     Tape Noris Crarosa Tape] Rash       MEDICATIONS:   Current Outpatient Medications   Medication Sig Dispense Refill    fluticasone (FLONASE) 50 MCG/ACT nasal spray USE TWO SPRAY(S) IN EACH NOSTRIL ONCE DAILY 1 Bottle 5    losartan (COZAAR) 50 MG tablet TAKE 1 TABLET BY MOUTH ONCE DAILY 90 tablet 3    metFORMIN (GLUCOPHAGE) 500 MG tablet TAKE 1 TABLET BY MOUTH TWICE DAILY 180 tablet 3    rosuvastatin (CRESTOR) 5 MG tablet Take 5 mg by mouth daily      metoprolol succinate (TOPROL XL) 100 MG extended release tablet TAKE ONE TABLET BY MOUTH ONCE DAILY 90 tablet 3    levothyroxine (SYNTHROID) 50 MCG tablet TAKE ONE TABLET BY MOUTH IN THE MORNING ON EMPTY STOMACH 90 tablet 3    aspirin 81 MG tablet Take 162 mg by mouth daily      amLODIPine (NORVASC) 10 MG tablet TAKE ONE TABLET BY MOUTH ONCE DAILY 90 tablet 3    Chlorpheniramine Maleate (ALLERGY RELIEF PO) Take by mouth as needed      vitamin E 400 UNIT for pulm disease. There is not  history of recreational or IV drug use. The patient does not pets, dogs, cats turtles or exotic birds. Review of Systems:  Review of Systems -   General ROS: Completed and except as mentioned above were negative   Psychological ROS:  Completed and except as mentioned above were negative  Ophthalmic ROS:  Completed and except as mentioned above were negative  ENT ROS:  Completed and except as mentioned above were negative  Allergy and Immunology ROS:  Completed and except as mentioned above were negative  Hematological and Lymphatic ROS:  Completed and except as mentioned above were negative  Endocrine ROS: Completed and except as mentioned above were negative  Breast ROS:  Completed and except as mentioned above were negative  Respiratory ROS:  Completed and except as mentioned above were negative  Cardiovascular ROS:  Completed and except as mentioned above were negative  Gastrointestinal ROS: Completed and except as mentioned above were negative  Genito-Urinary ROS:  Completed and except as mentioned above were negative  Musculoskeletal ROS:  Completed and except as mentioned above were negative  Neurological ROS:  Completed and except as mentioned above were negative  Dermatological ROS:  Completed and except as mentioned above were negative    SLEEP  No epistaxis or sore throat. Has snoring associated with daytime fatigue and daytime sleepiness and tiredness   No MVA. No edema.       PHYSICAL EXAMINATION:  Vitals:    06/03/19 1547 06/03/19 1555   BP: (!) 152/85 (!) 155/90   Pulse: 72    Resp: 16    Temp: 99.2 °F (37.3 °C)    SpO2: 97%    Weight: 226 lb 3.2 oz (102.6 kg)    Height: 5' 10\" (1.778 m)      PHYSICAL EXAMINATION:  Vitals:    06/03/19 1547 06/03/19 1555   BP: (!) 152/85 (!) 155/90   Pulse: 72    Resp: 16    Temp: 99.2 °F (37.3 °C)    SpO2: 97%    Weight: 226 lb 3.2 oz (102.6 kg)    Height: 5' 10\" (1.778 m)      Constitutional: This is a well developed, well nourished, 30-34.9 - Obesity Grade I 80y.o. year old female who is alert, oriented, cooperative and in no apparent distress. Head:normocephalic and atraumatic. EENT:  CARLOS. No conjunctival injections. Septum was midline, mucosa was without erythema, exudates or cobblestoning. No thrush was noted. MallampatiIII (soft palate, base of uvula visible)  Neck: Supple without thyromegaly. No elevated JVP. Trachea was midline. Respiratory: Chest was symmetrical without dullness to percussion. Breath sounds bilaterally were clear to auscultation. There were no wheezes, rhonchi or rales. There is no intercostal retraction or use of accessory muscles. No egophony noted. Cardiovascular: Regular without murmur, clicks, gallops or rubs. Abdomen: Slightly rounded and soft without organomegaly. No rebound, rigidity or guarding was appreciated. Lymphatic: No lymphadenopathy. Musculoskeletal: Normal curvature of the spine. No gross muscle weakness. Extremities:  No lower extremity edema, ulcerations, tenderness, varicosities or erythema. Muscle size, tone and strength are normal.  No involuntary movements are noted. Skin:  Warm and dry. Good color, turgor and pigmentation. No lesions or scars. No cyanosis or clubbing  Neurological/Psychiatric: The patient's general behavior, level of consciousness, thought content and emotional status is normal.          DATA:     pft's-showed decrease in diffusing capacity    Venous Doppler's and recently showed left lower extremity DVT. Echocardiogram showed chronic diastolic heart failure    CXR: REVIEWED: On April 26 of 2019 showed slightly enlarged cardiac size      IMPRESSION:   1. Chronic cough    2. Post-nasal drip    3. LAYO (obstructive sleep apnea)    4. Obesity due to excess calories, unspecified obesity severity    5. DVT femoral (deep venous thrombosis) with thrombophlebitis, left (HCC)    6. Hoarseness of voice    7. Polycythemia vera ([address]    8. Pulmonary hypertension ([facility] [address]    9. Chronic diastolic (congestive) heart failure (HCC)                   PLAN:       Obtain venous Dopplers, ventilation/perfusion scan, echocardiogram.  Refills were provided-Flonase   Educated and clarified the medication use. Showed the patient how to use Flonase. Continue using allergy medications  Terrell Cortes received counseling on the following healthy behaviors: nutrition, exercise and medication adherence  Recommend flu vaccination in the fall annually. Recommendations given regarding pneumococcal vaccinations. Patient is up-to-date with vaccinations from pulmonary perspective. Maintain an active lifestyle. All the questions that the patient and the family has had were answered to their satisfaction. Home O2 evaluation to be done. Supplemental oxygen was not needed  Pulmonary function tests were reviewed  Chest x-ray was reviewed  CT scan of the chest was reviewed/ordered. After reviewing the patient's smoking history and his age patient does not meet the criteria for lung cancer screening. Polysomnogram with CPAP/BiPAP titration if needed. Brochure provided on sleep apnea. Weight loss was recommended and discussed. Recommended following good sleep hygiene instructions. Explained importance of compliance with treatment of sleep apnea. Pt is not to drive if sleepy. We'll see the patient back in 2 months or earlier if needed. Patient will call us if she is sick, so she can be seen sooner. Thank you for having us involved in the care of your patient. Please call us if you have any questions or concerns.               Mac Whitaker MD  6/4/2019 6:45 PM

## 2019-06-04 NOTE — TELEPHONE ENCOUNTER
Scheduling called and questioned if the patient needed the PFT and Bilateral Venous Scan. She has recently had both of these tests completed, the PFT on 5/7/19 and the Bilateral Venous Scan on 3/27/19.

## 2019-06-20 ENCOUNTER — HOSPITAL ENCOUNTER (OUTPATIENT)
Dept: NUCLEAR MEDICINE | Age: 69
Discharge: HOME | End: 2019-06-22
Payer: MEDICARE

## 2019-06-20 ENCOUNTER — TELEPHONE (OUTPATIENT)
Dept: PULMONOLOGY | Age: 69
End: 2019-06-20

## 2019-06-20 ENCOUNTER — APPOINTMENT (OUTPATIENT)
Dept: VASCULAR LAB | Age: 69
End: 2019-06-20
Payer: MEDICARE

## 2019-06-20 ENCOUNTER — HOSPITAL ENCOUNTER (OUTPATIENT)
Dept: NON INVASIVE DIAGNOSTICS | Age: 69
Discharge: HOME | End: 2019-06-20
Payer: MEDICARE

## 2019-06-20 ENCOUNTER — HOSPITAL ENCOUNTER (OUTPATIENT)
Dept: GENERAL RADIOLOGY | Age: 69
Discharge: HOME | End: 2019-06-22
Payer: MEDICARE

## 2019-06-20 ENCOUNTER — HOSPITAL ENCOUNTER (OUTPATIENT)
Age: 69
Discharge: HOME | End: 2019-06-22
Payer: MEDICARE

## 2019-06-20 DIAGNOSIS — I27.20 PULMONARY HYPERTENSION (HCC): ICD-10-CM

## 2019-06-20 DIAGNOSIS — I27.20 PULMONARY HYPERTENSION (HCC): Primary | ICD-10-CM

## 2019-06-20 LAB
LV EF: 55 %
LVEF MODALITY: NORMAL

## 2019-06-20 PROCEDURE — 71046 X-RAY EXAM CHEST 2 VIEWS: CPT

## 2019-06-20 PROCEDURE — 93306 TTE W/DOPPLER COMPLETE: CPT

## 2019-06-20 PROCEDURE — A9539 TC99M PENTETATE: HCPCS | Performed by: INTERNAL MEDICINE

## 2019-06-20 PROCEDURE — 78582 LUNG VENTILAT&PERFUS IMAGING: CPT

## 2019-06-20 PROCEDURE — 3430000000 HC RX DIAGNOSTIC RADIOPHARMACEUTICAL: Performed by: INTERNAL MEDICINE

## 2019-06-20 PROCEDURE — A9540 TC99M MAA: HCPCS | Performed by: INTERNAL MEDICINE

## 2019-06-20 RX ORDER — KIT FOR THE PREPARATION OF TECHNETIUM TC 99M PENTETATE 20 MG/1
35 INJECTION, POWDER, LYOPHILIZED, FOR SOLUTION INTRAVENOUS; RESPIRATORY (INHALATION)
Status: COMPLETED | OUTPATIENT
Start: 2019-06-20 | End: 2019-06-20

## 2019-06-20 RX ADMIN — KIT FOR THE PREPARATION OF TECHNETIUM TC 99M PENTETATE 35 MILLICURIE: 20 INJECTION, POWDER, LYOPHILIZED, FOR SOLUTION INTRAVENOUS; RESPIRATORY (INHALATION) at 09:21

## 2019-06-20 RX ADMIN — Medication 5 MILLICURIE: at 09:28

## 2019-06-27 DIAGNOSIS — D45 POLYCYTHEMIA VERA (HCC): ICD-10-CM

## 2019-07-08 ENCOUNTER — HOSPITAL ENCOUNTER (OUTPATIENT)
Dept: SLEEP CENTER | Age: 69
Discharge: HOME | End: 2019-07-08
Payer: MEDICARE

## 2019-07-08 DIAGNOSIS — G47.33 OSA (OBSTRUCTIVE SLEEP APNEA): ICD-10-CM

## 2019-07-08 PROCEDURE — 95810 POLYSOM 6/> YRS 4/> PARAM: CPT

## 2019-07-18 LAB — STATUS: NORMAL

## 2019-08-05 ENCOUNTER — HOSPITAL ENCOUNTER (OUTPATIENT)
Dept: INFUSION THERAPY | Age: 69
Discharge: HOME | End: 2019-08-05
Payer: MEDICARE

## 2019-08-05 ENCOUNTER — OFFICE VISIT (OUTPATIENT)
Dept: PULMONOLOGY | Age: 69
End: 2019-08-05
Payer: MEDICARE

## 2019-08-05 VITALS
TEMPERATURE: 98.2 F | BODY MASS INDEX: 32.13 KG/M2 | WEIGHT: 224.4 LBS | HEART RATE: 69 BPM | RESPIRATION RATE: 16 BRPM | DIASTOLIC BLOOD PRESSURE: 89 MMHG | HEIGHT: 70 IN | OXYGEN SATURATION: 95 % | SYSTOLIC BLOOD PRESSURE: 153 MMHG

## 2019-08-05 DIAGNOSIS — I82.412 DVT FEMORAL (DEEP VENOUS THROMBOSIS) WITH THROMBOPHLEBITIS, LEFT (HCC): ICD-10-CM

## 2019-08-05 DIAGNOSIS — E66.09 OBESITY DUE TO EXCESS CALORIES, UNSPECIFIED OBESITY SEVERITY: ICD-10-CM

## 2019-08-05 DIAGNOSIS — R49.0 HOARSENESS OF VOICE: ICD-10-CM

## 2019-08-05 DIAGNOSIS — I50.32 CHRONIC DIASTOLIC (CONGESTIVE) HEART FAILURE (HCC): ICD-10-CM

## 2019-08-05 DIAGNOSIS — R09.82 POST-NASAL DRIP: ICD-10-CM

## 2019-08-05 DIAGNOSIS — G47.33 OSA (OBSTRUCTIVE SLEEP APNEA): Primary | ICD-10-CM

## 2019-08-05 DIAGNOSIS — D45 POLYCYTHEMIA VERA (HCC): ICD-10-CM

## 2019-08-05 PROCEDURE — 1090F PRES/ABSN URINE INCON ASSESS: CPT | Performed by: INTERNAL MEDICINE

## 2019-08-05 PROCEDURE — G8427 DOCREV CUR MEDS BY ELIG CLIN: HCPCS | Performed by: INTERNAL MEDICINE

## 2019-08-05 PROCEDURE — 1036F TOBACCO NON-USER: CPT | Performed by: INTERNAL MEDICINE

## 2019-08-05 PROCEDURE — 99214 OFFICE O/P EST MOD 30 MIN: CPT | Performed by: INTERNAL MEDICINE

## 2019-08-05 PROCEDURE — G8417 CALC BMI ABV UP PARAM F/U: HCPCS | Performed by: INTERNAL MEDICINE

## 2019-08-05 PROCEDURE — 6360000002 HC RX W HCPCS: Performed by: INTERNAL MEDICINE

## 2019-08-05 PROCEDURE — G8399 PT W/DXA RESULTS DOCUMENT: HCPCS | Performed by: INTERNAL MEDICINE

## 2019-08-05 PROCEDURE — 4040F PNEUMOC VAC/ADMIN/RCVD: CPT | Performed by: INTERNAL MEDICINE

## 2019-08-05 PROCEDURE — 90732 PPSV23 VACC 2 YRS+ SUBQ/IM: CPT | Performed by: INTERNAL MEDICINE

## 2019-08-05 PROCEDURE — G0009 ADMIN PNEUMOCOCCAL VACCINE: HCPCS | Performed by: INTERNAL MEDICINE

## 2019-08-05 PROCEDURE — 3017F COLORECTAL CA SCREEN DOC REV: CPT | Performed by: INTERNAL MEDICINE

## 2019-08-05 PROCEDURE — 1123F ACP DISCUSS/DSCN MKR DOCD: CPT | Performed by: INTERNAL MEDICINE

## 2019-08-05 RX ADMIN — PNEUMOCOCCAL VACCINE POLYVALENT 0.5 ML
25; 25; 25; 25; 25; 25; 25; 25; 25; 25; 25; 25; 25; 25; 25; 25; 25; 25; 25; 25; 25; 25; 25 INJECTION, SOLUTION INTRAMUSCULAR; SUBCUTANEOUS at 11:51

## 2019-08-05 NOTE — PROGRESS NOTES
PULMONARY OP progress note        REFERRED BY: Luis Gomez MD    REASON FOR CONSULTATION:  Chronic cough, pulmonary hypertension and sleep apnea    Patient is being seen in follow-up for-  1. LAYO (obstructive sleep apnea)    2. Post-nasal drip    3. Obesity due to excess calories, unspecified obesity severity    4. DVT femoral (deep venous thrombosis) with thrombophlebitis, left (HCC)    5. Polycythemia vera ([address]    6. Chronic diastolic (congestive) heart failure (HCC)    7. Hoarseness of voice          HISTORY OF PRESENT ILLNESS:    Her cough is improved  Continues to have hoarseness of the voice. Denied any shortness of breath or wheezing. No hemoptysis. Her recent echocardiogram showed class II diastolic dysfunction  No comment of pulmonary artery hypertension  Her polycythemia also is improving  Sleep study revealed mild sleep apnea with an AHI of 7  Has daytime sleepiness and fatigue and tiredness  Has also polycythemia vera for which she gets treatment. Of late her counts have been stable for longer periods of time requiring less phlebotomies  Other review of systems is significant for having postnasal discharge, better  I had a chance to talk to her vascular doctor, Dr. Jenna Shell and he mentioned that the DVT was of no clinical significance      PAST MEDICAL HISTORY:       Diagnosis Date    Activity intolerance 06/06/2018    Allergic rhinitis due to other allergen     Depressive disorder, not elsewhere classified     Diabetes ([facility] [address]     Diverticulosis     H/O echocardiogram 09/07/2016    EF 55%. LV wall thickness is mildly increased. Left atrium is severely dilated (>40) left atrial volume index of 41 ml/m2. Mild mitral and treicuspid regurg. Moderate pulmonary hypertension estimated right ventricular systolic pressure of 42 mmHg. Moderate diastolic dysfunction is seen.     Hypertension     LAYO (obstructive sleep apnea) 12/14/2015    Other and unspecified hyperlipidemia     Other MOUTH ONCE DAILY 90 tablet 3    levothyroxine (SYNTHROID) 50 MCG tablet TAKE ONE TABLET BY MOUTH IN THE MORNING ON EMPTY STOMACH 90 tablet 3    aspirin 81 MG tablet Take 162 mg by mouth daily      Chlorpheniramine Maleate (ALLERGY RELIEF PO) Take by mouth as needed      vitamin E 400 UNIT capsule Take 400 Units by mouth daily      vitamin D 1000 UNITS CAPS Take by mouth daily D3      glucose blood VI test strips (LORELEI CONTOUR TEST) strip 1 each by In Vitro route daily As needed. 100 each 3    Lancets MISC 1 each by Does not apply route daily Type II diabetes mellitus 944 each 3    Garlic 906 MG TABS Take  by mouth daily.  Coenzyme Q10 (COQ10) 50 MG CAPS Take  by mouth daily.  Evening Primrose Oil 500 MG CAPS Take 1,000 mg by mouth daily       Multiple Vitamins-Minerals (THERAPEUTIC MULTIVITAMIN-MINERALS) tablet Take 1 tablet by mouth daily.  ALPRAZolam (XANAX) 0.25 MG tablet Take 0.25 mg by mouth 3 times daily as needed.  Acai Solorio 500 MG CAPS Take 1 tablet by mouth daily.  Calcium Citrate-Vitamin D (CITRACAL + D PO) Take by mouth daily       Omega-3 Fatty Acids (OMEGA 3 PO) Take 2 tablets by mouth daily.  FLAXSEED OIL 1 tablet by Does not apply route daily.  rosuvastatin (CRESTOR) 5 MG tablet Take 1 tablet by mouth nightly 90 tablet 3    Blood Glucose Monitoring Suppl (FREESTYLE LITE) KORY        No current facility-administered medications for this visit.       Facility-Administered Medications Ordered in Other Visits   Medication Dose Route Frequency Provider Last Rate Last Dose    pneumococcal polyvalent (PNEUMOVAX 23) inj 0.5 mL  0.5 mL Intramuscular Once Kiki Silva MD        sodium chloride flush 0.9 % injection 10 mL  10 mL Intravenous PRN Nasfat Nydia Marie MD        sodium chloride (PF) 0.9 % injection 10 mL  10 mL Intravenous PRN Yen Ruano MD           FAMILY HISTORY: family history includes Cancer in her father; Diabetes in her mother; Hearing Loss in her father; High Blood Pressure in her father and mother; Stroke in her mother. SOCIAL AND OCCUPATIONAL HEALTH:  The patient is a Never smoker. There  is not history of TB or TB exposure. There is not asbestos or silica dust exposure. The patient reports does not have coal, foundry, quarry or [employer] exposure. Travel history reveals no significant history of risk factors for pulm disease. There is not  history of recreational or IV drug use. The patient does not pets, dogs, cats turtles or exotic birds. Review of Systems:  Review of Systems -   General ROS: Completed and except as mentioned above were negative   Psychological ROS:  Completed and except as mentioned above were negative  Ophthalmic ROS:  Completed and except as mentioned above were negative  ENT ROS:  Completed and except as mentioned above were negative  Allergy and Immunology ROS:  Completed and except as mentioned above were negative  Hematological and Lymphatic ROS:  Completed and except as mentioned above were negative  Endocrine ROS: Completed and except as mentioned above were negative  Breast ROS:  Completed and except as mentioned above were negative  Respiratory ROS:  Completed and except as mentioned above were negative  Cardiovascular ROS:  Completed and except as mentioned above were negative  Gastrointestinal ROS: Completed and except as mentioned above were negative  Genito-Urinary ROS:  Completed and except as mentioned above were negative  Musculoskeletal ROS:  Completed and except as mentioned above were negative  Neurological ROS:  Completed and except as mentioned above were negative  Dermatological ROS:  Completed and except as mentioned above were negative    SLEEP  No epistaxis or sore throat. Has snoring associated with daytime fatigue and daytime sleepiness and tiredness   No MVA. No edema.       PHYSICAL EXAMINATION:  Vitals:    08/05/19 1057 08/05/19 1101   BP: (!) 160/102 (!) x-ray done simultaneously did not show any acute process  Echocardiogram showed grade 2 diastolic dysfunction without pulmonary artery hypertension  Polysomnogram showed evidence of mild sleep apnea with an AHI of 7 without any leg movements  Her last hematocrit was 43.5    IMPRESSION:   1. LAYO (obstructive sleep apnea)    2. Post-nasal drip    3. Obesity due to excess calories, unspecified obesity severity    4. DVT femoral (deep venous thrombosis) with thrombophlebitis, left (HCC)    5. Polycythemia vera ([address]    6. Chronic diastolic (congestive) heart failure (HCC)    7. Hoarseness of voice                   PLAN:       Reviewed ventilation/perfusion scan, echocardiogram.  Refills were provided-auto CPAP machine at the pressure range of 5 to 15 cm of water along with supplies  Recommended using a golf ball with a T-shirt worn backwards to decrease her being on supine position as she spends most of time in supine position and this may help sleep apnea  Educated and clarified the medication use. Showed the patient how to use Flonase. Continue using Flonase  Recommended exercising with a gradual increase in the capability  Continue using allergy medications  Terence Santana received counseling on the following healthy behaviors: nutrition, exercise and medication adherence  Recommend flu vaccination in the fall annually. Recommendations given regarding pneumococcal vaccinations. She will get pneumococcal vaccination, Pneumovax 23 today  Patient is up-to-date with vaccinations from pulmonary perspective. Maintain an active lifestyle. All the questions that the patient and the family has had were answered to their satisfaction. Home O2 evaluation to be done. Supplemental oxygen was not needed  Chest x-ray was reviewed  After reviewing the patient's smoking history and his age patient does not meet the criteria for lung cancer screening.   Auto CPAP machine with a range of 5 to 15 cm of water to be used every

## 2019-10-09 ENCOUNTER — OFFICE VISIT (OUTPATIENT)
Dept: VASCULAR SURGERY | Age: 69
End: 2019-10-09
Payer: MEDICARE

## 2019-10-09 VITALS
HEIGHT: 70 IN | BODY MASS INDEX: 31.6 KG/M2 | RESPIRATION RATE: 18 BRPM | WEIGHT: 220.7 LBS | DIASTOLIC BLOOD PRESSURE: 86 MMHG | HEART RATE: 70 BPM | SYSTOLIC BLOOD PRESSURE: 144 MMHG | TEMPERATURE: 96.9 F

## 2019-10-09 DIAGNOSIS — I87.2 VENOUS INSUFFICIENCY OF BOTH LOWER EXTREMITIES: Primary | ICD-10-CM

## 2019-10-09 PROCEDURE — G8399 PT W/DXA RESULTS DOCUMENT: HCPCS | Performed by: INTERNAL MEDICINE

## 2019-10-09 PROCEDURE — G8417 CALC BMI ABV UP PARAM F/U: HCPCS | Performed by: INTERNAL MEDICINE

## 2019-10-09 PROCEDURE — G8484 FLU IMMUNIZE NO ADMIN: HCPCS | Performed by: INTERNAL MEDICINE

## 2019-10-09 PROCEDURE — 3017F COLORECTAL CA SCREEN DOC REV: CPT | Performed by: INTERNAL MEDICINE

## 2019-10-09 PROCEDURE — G8427 DOCREV CUR MEDS BY ELIG CLIN: HCPCS | Performed by: INTERNAL MEDICINE

## 2019-10-09 PROCEDURE — 4040F PNEUMOC VAC/ADMIN/RCVD: CPT | Performed by: INTERNAL MEDICINE

## 2019-10-09 PROCEDURE — 1123F ACP DISCUSS/DSCN MKR DOCD: CPT | Performed by: INTERNAL MEDICINE

## 2019-10-09 PROCEDURE — 1036F TOBACCO NON-USER: CPT | Performed by: INTERNAL MEDICINE

## 2019-10-09 PROCEDURE — 1090F PRES/ABSN URINE INCON ASSESS: CPT | Performed by: INTERNAL MEDICINE

## 2019-10-09 PROCEDURE — 99213 OFFICE O/P EST LOW 20 MIN: CPT | Performed by: INTERNAL MEDICINE

## 2019-10-14 LAB
ABSOLUTE BASO #: 0 X10E9/L (ref 0–0.9)
ABSOLUTE EOS #: 0.2 X10E9/L (ref 0–0.4)
ABSOLUTE LYMPH #: 1.5 X10E9/L (ref 1–3.5)
ABSOLUTE MONO #: 0.5 X10E9/L (ref 0–0.9)
ABSOLUTE NEUT #: 2.8 X10E9/L (ref 1.5–6.6)
BASOPHILS RELATIVE PERCENT: 0.8 %
EOSINOPHILS RELATIVE PERCENT: 3.6 %
HCT VFR BLD CALC: 46.3 % (ref 35–47)
HEMOGLOBIN: 15.5 G/DL (ref 11.7–16.1)
LYMPHOCYTE %: 30.2 %
MCH RBC QN AUTO: 29.6 PG (ref 27–35)
MCHC RBC AUTO-ENTMCNC: 33.5 G/DL (ref 31–36)
MCV RBC AUTO: 88 FL (ref 81–101)
MONOCYTES # BLD: 10.1 %
NEUTROPHILS RELATIVE PERCENT: 55.3 %
PDW BLD-RTO: 15.3 % (ref 11.5–14.7)
PLATELETS: 166 X10E9/L (ref 150–450)
PMV BLD AUTO: 9.8 FL (ref 7–12)
RBC: 5.24 X10E12/L (ref 3.55–5.2)
WBC: 5.1 X10E9/L (ref 4–11.8)

## 2019-10-18 ENCOUNTER — OFFICE VISIT (OUTPATIENT)
Dept: ONCOLOGY | Age: 69
End: 2019-10-18
Payer: MEDICARE

## 2019-10-18 ENCOUNTER — HOSPITAL ENCOUNTER (OUTPATIENT)
Dept: INFUSION THERAPY | Age: 69
Discharge: HOME | End: 2019-10-18
Payer: MEDICARE

## 2019-10-18 VITALS
SYSTOLIC BLOOD PRESSURE: 138 MMHG | BODY MASS INDEX: 31.5 KG/M2 | DIASTOLIC BLOOD PRESSURE: 79 MMHG | HEIGHT: 70 IN | WEIGHT: 220 LBS | HEART RATE: 65 BPM | RESPIRATION RATE: 18 BRPM

## 2019-10-18 VITALS
RESPIRATION RATE: 18 BRPM | TEMPERATURE: 98.3 F | HEART RATE: 65 BPM | SYSTOLIC BLOOD PRESSURE: 138 MMHG | DIASTOLIC BLOOD PRESSURE: 79 MMHG

## 2019-10-18 DIAGNOSIS — D45 POLYCYTHEMIA VERA (HCC): Primary | ICD-10-CM

## 2019-10-18 DIAGNOSIS — D45 POLYCYTHEMIA VERA (HCC): ICD-10-CM

## 2019-10-18 DIAGNOSIS — D75.1 POLYCYTHEMIA: Primary | ICD-10-CM

## 2019-10-18 PROCEDURE — G8417 CALC BMI ABV UP PARAM F/U: HCPCS | Performed by: INTERNAL MEDICINE

## 2019-10-18 PROCEDURE — 99214 OFFICE O/P EST MOD 30 MIN: CPT | Performed by: INTERNAL MEDICINE

## 2019-10-18 PROCEDURE — G8484 FLU IMMUNIZE NO ADMIN: HCPCS | Performed by: INTERNAL MEDICINE

## 2019-10-18 PROCEDURE — 99195 PHLEBOTOMY: CPT

## 2019-10-18 PROCEDURE — G8427 DOCREV CUR MEDS BY ELIG CLIN: HCPCS | Performed by: INTERNAL MEDICINE

## 2019-10-18 PROCEDURE — 1123F ACP DISCUSS/DSCN MKR DOCD: CPT | Performed by: INTERNAL MEDICINE

## 2019-10-18 PROCEDURE — 3017F COLORECTAL CA SCREEN DOC REV: CPT | Performed by: INTERNAL MEDICINE

## 2019-10-18 PROCEDURE — 1090F PRES/ABSN URINE INCON ASSESS: CPT | Performed by: INTERNAL MEDICINE

## 2019-10-18 PROCEDURE — G8399 PT W/DXA RESULTS DOCUMENT: HCPCS | Performed by: INTERNAL MEDICINE

## 2019-10-18 PROCEDURE — 1036F TOBACCO NON-USER: CPT | Performed by: INTERNAL MEDICINE

## 2019-10-18 PROCEDURE — 4040F PNEUMOC VAC/ADMIN/RCVD: CPT | Performed by: INTERNAL MEDICINE

## 2019-10-18 RX ORDER — 0.9 % SODIUM CHLORIDE 0.9 %
250 INTRAVENOUS SOLUTION INTRAVENOUS ONCE
Status: CANCELLED | OUTPATIENT
Start: 2020-01-31

## 2019-10-18 RX ORDER — 0.9 % SODIUM CHLORIDE 0.9 %
500 INTRAVENOUS SOLUTION INTRAVENOUS ONCE
Status: CANCELLED | OUTPATIENT
Start: 2020-01-31

## 2019-10-18 NOTE — PLAN OF CARE
Problem: KNOWLEDGE DEFICIT  Goal: Patient/S.O. demonstrates understanding of disease process, treatment plan, medications, and discharge instructions.   Outcome: Met This Shift     Problem: Fluid Volume - Risk of, Imbalanced  Goal: Other  Description  To tolerate the phlebotomy with no signs or symptoms of fluid imbalance noted     Outcome: Met This Shift

## 2019-10-18 NOTE — PROGRESS NOTES
Pt arrived for scheduled phlebotomy. 8001 left antecubital accessed with 16G closed bag system, immediate return of blood noted. Instructed pt to drink water provided during procedure. Instructed pt to alert nurse if feeling lightheaded or dizzy. Pt verbalized understanding. 0830 Pt tolerating procedure well, drinking water. Resp easy. 0840 Pt continues to verbalize feeling fine, talking during procedure. Drinking water, resp easy. 0847 500ml of blood removed. Pt states no dizziness or lightheadedness experienced during procedure. 0850 /81 P70. Pt sipping or water. 0855 /77 P70. Pt continues to deny an dizziness. Resp easy. Cont to sip on water. 0900 /75 P66, talking and drinking water. Denies any dizziness. Resp easy. 0905 123/83 P 66 pt drank all water and denies feeling any dizzy feelings. 0910 144/90 P65 Instructed pt to continue to drink water throughout today and if feeling lightheaded or dizzy to rest. Pt verbalized understanding. Pt discharged to doctor visit at this time.

## 2019-11-04 ENCOUNTER — OFFICE VISIT (OUTPATIENT)
Dept: CARDIOLOGY | Age: 69
End: 2019-11-04
Payer: MEDICARE

## 2019-11-04 VITALS
BODY MASS INDEX: 32.1 KG/M2 | HEIGHT: 70 IN | RESPIRATION RATE: 17 BRPM | HEART RATE: 74 BPM | OXYGEN SATURATION: 97 % | DIASTOLIC BLOOD PRESSURE: 84 MMHG | SYSTOLIC BLOOD PRESSURE: 136 MMHG | WEIGHT: 224.2 LBS

## 2019-11-04 DIAGNOSIS — I34.0 MILD MITRAL REGURGITATION: ICD-10-CM

## 2019-11-04 DIAGNOSIS — I07.1 MILD TRICUSPID REGURGITATION: ICD-10-CM

## 2019-11-04 DIAGNOSIS — R00.2 INTERMITTENT PALPITATIONS: ICD-10-CM

## 2019-11-04 DIAGNOSIS — I51.7 MODERATE LEFT VENTRICULAR HYPERTROPHY: Primary | ICD-10-CM

## 2019-11-04 DIAGNOSIS — E78.2 MIXED HYPERLIPIDEMIA: ICD-10-CM

## 2019-11-04 DIAGNOSIS — I10 ESSENTIAL HYPERTENSION: ICD-10-CM

## 2019-11-04 DIAGNOSIS — I27.20 PULMONARY HYPERTENSION (HCC): ICD-10-CM

## 2019-11-04 PROCEDURE — 4040F PNEUMOC VAC/ADMIN/RCVD: CPT | Performed by: FAMILY MEDICINE

## 2019-11-04 PROCEDURE — G8484 FLU IMMUNIZE NO ADMIN: HCPCS | Performed by: FAMILY MEDICINE

## 2019-11-04 PROCEDURE — 93000 ELECTROCARDIOGRAM COMPLETE: CPT | Performed by: FAMILY MEDICINE

## 2019-11-04 PROCEDURE — G8399 PT W/DXA RESULTS DOCUMENT: HCPCS | Performed by: FAMILY MEDICINE

## 2019-11-04 PROCEDURE — 1036F TOBACCO NON-USER: CPT | Performed by: FAMILY MEDICINE

## 2019-11-04 PROCEDURE — 1090F PRES/ABSN URINE INCON ASSESS: CPT | Performed by: FAMILY MEDICINE

## 2019-11-04 PROCEDURE — 99214 OFFICE O/P EST MOD 30 MIN: CPT | Performed by: FAMILY MEDICINE

## 2019-11-04 PROCEDURE — 1123F ACP DISCUSS/DSCN MKR DOCD: CPT | Performed by: FAMILY MEDICINE

## 2019-11-04 PROCEDURE — G8417 CALC BMI ABV UP PARAM F/U: HCPCS | Performed by: FAMILY MEDICINE

## 2019-11-04 PROCEDURE — G8427 DOCREV CUR MEDS BY ELIG CLIN: HCPCS | Performed by: FAMILY MEDICINE

## 2019-11-04 PROCEDURE — 3017F COLORECTAL CA SCREEN DOC REV: CPT | Performed by: FAMILY MEDICINE

## 2019-11-04 RX ORDER — LOSARTAN POTASSIUM 100 MG/1
100 TABLET ORAL DAILY
Qty: 90 TABLET | Refills: 3 | Status: SHIPPED | OUTPATIENT
Start: 2019-11-04 | End: 2020-10-05 | Stop reason: SDUPTHER

## 2019-11-18 ENCOUNTER — OFFICE VISIT (OUTPATIENT)
Dept: PULMONOLOGY | Age: 69
End: 2019-11-18
Payer: MEDICARE

## 2019-11-18 VITALS
SYSTOLIC BLOOD PRESSURE: 135 MMHG | DIASTOLIC BLOOD PRESSURE: 76 MMHG | RESPIRATION RATE: 16 BRPM | OXYGEN SATURATION: 95 % | BODY MASS INDEX: 31.5 KG/M2 | HEIGHT: 70 IN | TEMPERATURE: 98.4 F | WEIGHT: 220 LBS | HEART RATE: 71 BPM

## 2019-11-18 DIAGNOSIS — R49.0 HOARSENESS OF VOICE: ICD-10-CM

## 2019-11-18 DIAGNOSIS — G47.33 OSA (OBSTRUCTIVE SLEEP APNEA): Primary | ICD-10-CM

## 2019-11-18 DIAGNOSIS — I50.32 CHRONIC DIASTOLIC (CONGESTIVE) HEART FAILURE (HCC): ICD-10-CM

## 2019-11-18 DIAGNOSIS — I27.20 PULMONARY HYPERTENSION (HCC): ICD-10-CM

## 2019-11-18 DIAGNOSIS — D45 POLYCYTHEMIA VERA (HCC): ICD-10-CM

## 2019-11-18 DIAGNOSIS — E66.09 OBESITY DUE TO EXCESS CALORIES, UNSPECIFIED OBESITY SEVERITY: ICD-10-CM

## 2019-11-18 DIAGNOSIS — R09.82 POST-NASAL DRIP: ICD-10-CM

## 2019-11-18 DIAGNOSIS — I82.412 DVT FEMORAL (DEEP VENOUS THROMBOSIS) WITH THROMBOPHLEBITIS, LEFT (HCC): ICD-10-CM

## 2019-11-18 PROCEDURE — G8417 CALC BMI ABV UP PARAM F/U: HCPCS | Performed by: INTERNAL MEDICINE

## 2019-11-18 PROCEDURE — 1036F TOBACCO NON-USER: CPT | Performed by: INTERNAL MEDICINE

## 2019-11-18 PROCEDURE — 3017F COLORECTAL CA SCREEN DOC REV: CPT | Performed by: INTERNAL MEDICINE

## 2019-11-18 PROCEDURE — 99214 OFFICE O/P EST MOD 30 MIN: CPT | Performed by: INTERNAL MEDICINE

## 2019-11-18 PROCEDURE — G8484 FLU IMMUNIZE NO ADMIN: HCPCS | Performed by: INTERNAL MEDICINE

## 2019-11-18 PROCEDURE — G8427 DOCREV CUR MEDS BY ELIG CLIN: HCPCS | Performed by: INTERNAL MEDICINE

## 2019-11-18 PROCEDURE — 1090F PRES/ABSN URINE INCON ASSESS: CPT | Performed by: INTERNAL MEDICINE

## 2019-11-18 PROCEDURE — G8399 PT W/DXA RESULTS DOCUMENT: HCPCS | Performed by: INTERNAL MEDICINE

## 2019-11-18 PROCEDURE — 1123F ACP DISCUSS/DSCN MKR DOCD: CPT | Performed by: INTERNAL MEDICINE

## 2019-11-18 PROCEDURE — 4040F PNEUMOC VAC/ADMIN/RCVD: CPT | Performed by: INTERNAL MEDICINE

## 2020-01-13 ENCOUNTER — HOSPITAL ENCOUNTER (OUTPATIENT)
Age: 70
Setting detail: SPECIMEN
Discharge: HOME | End: 2020-01-13
Payer: MEDICARE

## 2020-01-13 PROCEDURE — 87088 URINE BACTERIA CULTURE: CPT

## 2020-01-13 PROCEDURE — 87186 SC STD MICRODIL/AGAR DIL: CPT

## 2020-01-13 PROCEDURE — 87086 URINE CULTURE/COLONY COUNT: CPT

## 2020-01-15 LAB
CULTURE: ABNORMAL
Lab: ABNORMAL
SPECIMEN DESCRIPTION: ABNORMAL

## 2020-02-18 ENCOUNTER — HOSPITAL ENCOUNTER (OUTPATIENT)
Dept: INFUSION THERAPY | Age: 70
Discharge: HOME | End: 2020-02-18
Payer: MEDICARE

## 2020-02-18 VITALS
TEMPERATURE: 98.2 F | HEART RATE: 72 BPM | RESPIRATION RATE: 18 BRPM | DIASTOLIC BLOOD PRESSURE: 81 MMHG | SYSTOLIC BLOOD PRESSURE: 142 MMHG

## 2020-02-18 DIAGNOSIS — D45 POLYCYTHEMIA VERA (HCC): Primary | ICD-10-CM

## 2020-02-18 PROCEDURE — 99195 PHLEBOTOMY: CPT

## 2020-02-18 RX ORDER — 0.9 % SODIUM CHLORIDE 0.9 %
250 INTRAVENOUS SOLUTION INTRAVENOUS ONCE
Status: CANCELLED | OUTPATIENT
Start: 2020-05-19

## 2020-02-18 RX ORDER — 0.9 % SODIUM CHLORIDE 0.9 %
500 INTRAVENOUS SOLUTION INTRAVENOUS ONCE
Status: CANCELLED | OUTPATIENT
Start: 2020-05-19

## 2020-02-18 NOTE — PROGRESS NOTES
9496 Patient arrives ambulating for phelebtomy today. HCT 44.7. Patient states having symptoms of dizziness and headache the last couple of days and wishes to have phlebotomy. 8422 Phlebotomy started in right anti cubital with 16 gauge needle per drawing kit. Patient tolerated well. Drinking water  0827 Phlebotomy complete. 500 ml blood obtained. Needle removed and dressing removed. Patient voices no complaints. Drinking water  0830 130/80 pulse 80  0840 136/65 pulse 68 No complaints at this time  0850 128/88 pulse 74 Voices no complaints. Encouraged fluids today.   Left ambulating without problem

## 2020-05-13 ENCOUNTER — HOSPITAL ENCOUNTER (OUTPATIENT)
Dept: PULMONOLOGY | Age: 70
Discharge: HOME | End: 2020-05-13
Payer: MEDICARE

## 2020-05-13 PROCEDURE — 94762 N-INVAS EAR/PLS OXIMTRY CONT: CPT

## 2020-05-15 ENCOUNTER — TELEMEDICINE (OUTPATIENT)
Dept: PULMONOLOGY | Age: 70
End: 2020-05-15
Payer: MEDICARE

## 2020-05-15 PROCEDURE — 4040F PNEUMOC VAC/ADMIN/RCVD: CPT | Performed by: INTERNAL MEDICINE

## 2020-05-15 PROCEDURE — G8399 PT W/DXA RESULTS DOCUMENT: HCPCS | Performed by: INTERNAL MEDICINE

## 2020-05-15 PROCEDURE — 3017F COLORECTAL CA SCREEN DOC REV: CPT | Performed by: INTERNAL MEDICINE

## 2020-05-15 PROCEDURE — G8427 DOCREV CUR MEDS BY ELIG CLIN: HCPCS | Performed by: INTERNAL MEDICINE

## 2020-05-15 PROCEDURE — 1123F ACP DISCUSS/DSCN MKR DOCD: CPT | Performed by: INTERNAL MEDICINE

## 2020-05-15 PROCEDURE — 1090F PRES/ABSN URINE INCON ASSESS: CPT | Performed by: INTERNAL MEDICINE

## 2020-05-15 PROCEDURE — 99214 OFFICE O/P EST MOD 30 MIN: CPT | Performed by: INTERNAL MEDICINE

## 2020-05-15 NOTE — PROGRESS NOTES
except as mentioned above were negative  Hematological and Lymphatic ROS:  Completed and except as mentioned above were negative  Endocrine ROS: Completed and except as mentioned above were negative  Breast ROS:  Completed and except as mentioned above were negative  Respiratory ROS:  Completed and except as mentioned above were negative  Cardiovascular ROS:  Completed and except as mentioned above were negative  Gastrointestinal ROS: Completed and except as mentioned above were negative  Genito-Urinary ROS:  Completed and except as mentioned above were negative  Musculoskeletal ROS:  Completed and except as mentioned above were negative  Neurological ROS:  Completed and except as mentioned above were negative  Dermatological ROS:  Completed and except as mentioned above were negative    SLEEP  No epistaxis or sore throat. Patient has been using her CPAP machine with improvement in the daytime sleepiness and fatigue and tiredness  No MVA. No edema. PHYSICAL EXAMINATION:  There were no vitals filed for this visit. PHYSICAL EXAMINATION:  There were no vitals filed for this visit. PHYSICAL EXAMINATION:  Due to this being a TeleHealth encounter, evaluation of the following organ systems is limited: Vitals/Constitutional/EENT/Resp/CV/GI//MS/Neuro/Skin/Heme-Lymph-Imm. Constitutional: [x] Appears well-developed and well-nourished. [] Abnormal  Mental status  [x] Alert and awake  [x] Oriented to person/place/time [x]Able to follow commands    [x] No apparent distress      Eyes:  EOM    [x]  Normal  [] Abnormal-  Sclera  [x]  Normal  [] Abnormal -         Discharge [x]  None visible  [] Abnormal -    HENT:   [x] Normocephalic, atraumatic. [] Abnormal shaped head   [] Mouth/Throat: Mucous membranes are moist.     Ears [x] Normal  [] Abnormal-    Neck: [x] Normal range of motion [x] Supple [] No visualized mass.      Pulmonary/Chest: [x] Respiratory effort normal.  [x] No visualized signs of difficulty breathing or respiratory distress        [] Abnormal      Musculoskeletal:   [x] Normal range of motion. [] Normal gait with no signs of ataxia. [x]  No signs of cyanosis of the peripheral portions of extremities. [] Abnormal       Neurological:        [x] Normal cranial nerve (limited exam to video visit) [x] No focal weakness observed       [] Abnormal          Speech       [x] Normal   [] Abnormal     Skin:        [x] No rash on visible skin  [x] Normal  [] Abnormal     Psychiatric:       [x] Normal  [] Abnormal        [x] Normal Mood  [] Anxious appearing      Other pertinent exam findings:-              DATA:     Her nocturnal recording oximetry did not show any significant desaturation during sleep      Her compliance data with the CPAP machine was 70% for usage of more than 4 hours with an auto CPAP machine pressure range of 5 to 15 cm of water with a maximum pressure of 14.1 cm of water with a 95th percentile pressure of 13.1 with an AHI of 1.8        IMPRESSION:   1. LAYO (obstructive sleep apnea)    2. Post-nasal drip    3. Obesity due to excess calories, unspecified obesity severity    4. DVT femoral (deep venous thrombosis) with thrombophlebitis, left (HCC)    5. Chronic diastolic (congestive) heart failure (HCC)    6. Pulmonary hypertension ([address]    7. Polycythemia vera ([address]                   PLAN:         Refills were provided-none  Educated and clarified the medication use. Continue using Flonase  Recommended exercising with a gradual increase in the capability  Continue using allergy medications  Emmie Delaney received counseling on the following healthy behaviors: nutrition, exercise and medication adherence  Recommend flu vaccination in the fall annually. Recommendations given regarding pneumococcal vaccinations. Patient is up-to-date with vaccinations from pulmonary perspective. Maintain an active lifestyle.   All the questions that the patient and the family has had were answered to their satisfaction. Home O2 evaluation to be done. Supplemental oxygen was not needed  After reviewing the patient's smoking history and her age patient does not meet the criteria for lung cancer screening. Auto CPAP machine with a range of 5 to 15 cm of water to be used every night for at least 4 hours  Weight loss was recommended and discussed. Recommended following good sleep hygiene instructions. Explained importance of compliance with treatment of sleep apnea. Pt is not to drive if sleepy. Reviewed nocturnal recording oximetry  We'll see the patient back in 6 months or earlier if needed. Patient will call us if she is sick, so she can be seen sooner. Thank you for having us involved in the care of your patient. Please call us if you have any questions or concerns.               Sue Ornelas MD  5/15/2020 12:27 PM

## 2020-05-15 NOTE — PROCEDURES
[address]                               PULMONARY FUNCTION    PATIENT NAME: Elza SAUL                  :        1950  MED REC NO:   [id number]                              ROOM:  ACCOUNT NO:   [de-identified]                           ADMIT DATE: 2020  PROVIDER:     Zulma Daley    DATE OF PROCEDURE:  2020    The recording time was 7 hours and 45 minutes with a valid sampling time  of 6 hours and 35 minutes. The patient's pulse rate ranged from 51 per  minute to 81 per minute with a mean pulse rate of 58. The lowest oxygen  saturation was 84% and the highest oxygen saturation was 99% with a mean  oxygen saturation of 93.9%. The patient spent only 6 minutes time under  an oxygen saturation of 90% and she only spent 1 minute with an oxygen  saturation below 88%. IMPRESSION:  No significant desaturation during sleep with the usage of  CPAP therapy that could warrant any supplemental oxygen. Yumiko العلي    D: 05/15/2020 9:50:37       T: 05/15/2020 10:40:59     [id number]  Job#: [id number]     Doc#: [id number]    CC:  Jesús Hung

## 2020-06-13 LAB
ABSOLUTE BASO #: 0 X10E9/L (ref 0–0.9)
ABSOLUTE EOS #: 0.2 X10E9/L (ref 0–0.4)
ABSOLUTE LYMPH #: 1.8 X10E9/L (ref 1–3.5)
ABSOLUTE MONO #: 0.6 X10E9/L (ref 0–0.9)
ABSOLUTE NEUT #: 3.7 X10E9/L (ref 1.5–6.6)
BASOPHILS RELATIVE PERCENT: 0.7 %
EOSINOPHILS RELATIVE PERCENT: 2.7 %
HCT VFR BLD CALC: 47 % (ref 35–47)
HEMOGLOBIN: 15.3 G/DL (ref 11.7–16.1)
LYMPHOCYTE %: 29.1 %
MCH RBC QN AUTO: 29.7 PG (ref 27–35)
MCHC RBC AUTO-ENTMCNC: 32.6 G/DL (ref 31–36)
MCV RBC AUTO: 91 FL (ref 81–101)
MONOCYTES # BLD: 9.4 %
NEUTROPHILS RELATIVE PERCENT: 58.1 %
PDW BLD-RTO: 14.3 % (ref 11.5–14.7)
PLATELETS: 185 X10E9/L (ref 150–450)
PMV BLD AUTO: 9.5 FL (ref 7–12)
RBC: 5.16 X10E12/L (ref 3.55–5.2)
WBC: 6.3 X10E9/L (ref 4–11.8)

## 2020-06-16 ENCOUNTER — HOSPITAL ENCOUNTER (OUTPATIENT)
Dept: INFUSION THERAPY | Age: 70
Discharge: HOME | End: 2020-06-16
Payer: MEDICARE

## 2020-06-16 VITALS
BODY MASS INDEX: 32.07 KG/M2 | RESPIRATION RATE: 18 BRPM | SYSTOLIC BLOOD PRESSURE: 147 MMHG | HEART RATE: 69 BPM | DIASTOLIC BLOOD PRESSURE: 79 MMHG | HEIGHT: 70 IN | TEMPERATURE: 98.6 F | WEIGHT: 224 LBS

## 2020-06-16 DIAGNOSIS — D45 POLYCYTHEMIA VERA (HCC): Primary | ICD-10-CM

## 2020-06-16 PROCEDURE — 99195 PHLEBOTOMY: CPT

## 2020-06-16 RX ORDER — 0.9 % SODIUM CHLORIDE 0.9 %
500 INTRAVENOUS SOLUTION INTRAVENOUS ONCE
Status: CANCELLED | OUTPATIENT
Start: 2020-09-08

## 2020-06-16 RX ORDER — 0.9 % SODIUM CHLORIDE 0.9 %
250 INTRAVENOUS SOLUTION INTRAVENOUS ONCE
Status: CANCELLED | OUTPATIENT
Start: 2020-09-08

## 2020-06-16 NOTE — PROGRESS NOTES
0811Patient here for therapeutic phlebotomy, 16 g closed system bag intiated to left anticub. Patient tolerated well. Blood flowing briskly. 2767 Phlebotomy completed bag filled approx. 500 ml. Patient tolerated well. Denies dizziness/lightheadedness. 0820 /77 HR 67. Denies dizziness/lightheadedness. Patient drank 120 ml of water. 0825 /81 HR 67. Denies dizziness/lightheadedness. 0830 /79 HR 68. Denies dizziness/lightheadedness. Patient drank 120 ml of water.

## 2020-06-16 NOTE — PROGRESS NOTES
Patient standing, denies dizziness/lightheadedness. Patient instructed to rest and hydrate today, voices understanding.

## 2020-07-08 ENCOUNTER — OFFICE VISIT (OUTPATIENT)
Dept: VASCULAR SURGERY | Age: 70
End: 2020-07-08
Payer: MEDICARE

## 2020-07-08 VITALS
TEMPERATURE: 98 F | DIASTOLIC BLOOD PRESSURE: 78 MMHG | HEIGHT: 70 IN | HEART RATE: 75 BPM | BODY MASS INDEX: 31.35 KG/M2 | RESPIRATION RATE: 18 BRPM | WEIGHT: 219 LBS | SYSTOLIC BLOOD PRESSURE: 139 MMHG

## 2020-07-08 PROCEDURE — 1123F ACP DISCUSS/DSCN MKR DOCD: CPT | Performed by: INTERNAL MEDICINE

## 2020-07-08 PROCEDURE — 99214 OFFICE O/P EST MOD 30 MIN: CPT | Performed by: INTERNAL MEDICINE

## 2020-07-08 PROCEDURE — 4040F PNEUMOC VAC/ADMIN/RCVD: CPT | Performed by: INTERNAL MEDICINE

## 2020-07-08 PROCEDURE — G8427 DOCREV CUR MEDS BY ELIG CLIN: HCPCS | Performed by: INTERNAL MEDICINE

## 2020-07-08 PROCEDURE — 1090F PRES/ABSN URINE INCON ASSESS: CPT | Performed by: INTERNAL MEDICINE

## 2020-07-08 PROCEDURE — 1036F TOBACCO NON-USER: CPT | Performed by: INTERNAL MEDICINE

## 2020-07-08 PROCEDURE — 3017F COLORECTAL CA SCREEN DOC REV: CPT | Performed by: INTERNAL MEDICINE

## 2020-07-08 PROCEDURE — G8399 PT W/DXA RESULTS DOCUMENT: HCPCS | Performed by: INTERNAL MEDICINE

## 2020-07-08 PROCEDURE — 99214 OFFICE O/P EST MOD 30 MIN: CPT

## 2020-07-08 PROCEDURE — G8417 CALC BMI ABV UP PARAM F/U: HCPCS | Performed by: INTERNAL MEDICINE

## 2020-07-08 NOTE — PROGRESS NOTES
Alonoz Wright was seen on 7/8/2020 for   Chief Complaint   Patient presents with    Follow-up     pain behind left knee for couple months but better now   .                             REVIEW OF SYSTEMS    Constitutional Weight: absent, A, Fatigue: absent Fever: absent   HEENT Ears: normal,Visual disturbance: absent Sore throat: absent,    Respiratory Shortness of breath: absent, Cough: absent;, Snoring: absent   Cardivascular Chest pain: absent,  Leg pain: absent,Leg swelling:absent, Non-healing wound:absent    GI Diarrhea: absent, Abdominal Pain: absent    Urinary frequency: absent, Urinary incontinence: absent   Musculoskeletal Neck pain: absent, Back pain: present, Restless Leg:absent     Dermatological Hair loss: absent, Skin changes: absent   Neurological  Sudden Loss of Vision in one eye:absent, Slurred Speech:absent, Weakness on one side of body: absent,Headache: absent   Psychiatric Anxiety: present, Depression: present   Hematologic Abnormal bleeding: absent  ,

## 2020-07-08 NOTE — PROGRESS NOTES
Cathie Nageotte was seen on 7/8/2020 for   Chief Complaint   Patient presents with    Follow-up     pain behind left knee for couple months but better now   . 5/9/18 First recent vascular visit. PCP Dr. Nayeli Jesus. R leg GSV laser [address], 6/2/14, followed by L 4/30/15 laser c stab phlebectomy. Had pain, swelling, no skin breakdown, hyperpigmentation. Initial concern was for dvt, but studies were neg. Mom had varicose veins. Not treated. On feet at dept store all day. Legs felt btr after laser, still wore stockings, has developed sx last year. Both legs had swelling and pain. Thigh veins prominent. UPDATE 8/22/18 oN 6/6/18 had LGSV foam, then 6/13/18 RGSV. 6/27/18 DUS showed LPTV DVT, with bilateral GSV occlusive thrombus. Still has L thigh varicosity patent. Legs feel better. Still wearing support stockings. On her feet a lot. UPDATE 2/19/19 Right leg is much better. Has developed a small thigh varicosity which is mildly tender. Left leg had residual branch which was not sclerosed on 1st procedure, with possible plan to address later, probably with foam. Still has some thigh pain. UPDATE 4/3/19 Since last visit had 3/20/19 left lateral thigh polidocanol foam therapy using butterfly needle. Mild soreness post procedure with hyperpigmentation. Duplex 3/27/19 showed that veins in lateral left thigh and in knee area are clotted with no DVT. UPDATE 10/9/19 Legs have been doing well. Not painful. Notices left gaiter hyperpigmentation. Spider veins right medial foot. Lateral foot has varicose veins. Wearing compression stockings daily. UPDATE 7/8/20 Almost to 51th wedding anniversary. Had 2 months of left posterior knee pain, popliteal fossa area. Has gone away. No changes on foot or toes. Painful veins have resolved. Still has spider veins.     Social History     Socioeconomic History    Marital status:      Spouse name: Not on file    Number of children: Not on file    Years of education: Not on file    Highest education level: Not on file   Occupational History    Not on file   Social Needs    Financial resource strain: Not on file    Food insecurity     Worry: Not on file     Inability: Not on file    Transportation needs     Medical: Not on file     Non-medical: Not on file   Tobacco Use    Smoking status: Never Smoker    Smokeless tobacco: Never Used   Substance and Sexual Activity    Alcohol use: No    Drug use: Not on file    Sexual activity: Not on file   Lifestyle    Physical activity     Days per week: Not on file     Minutes per session: Not on file    Stress: Not on file   Relationships    Social connections     Talks on phone: Not on file     Gets together: Not on file     Attends [employer] service: Not on file     Active member of club or organization: Not on file     Attends meetings of clubs or organizations: Not on file     Relationship status: Not on file    Intimate partner violence     Fear of current or ex partner: Not on file     Emotionally abused: Not on file     Physically abused: Not on file     Forced sexual activity: Not on file   Other Topics Concern    Not on file   Social History Narrative    Not on file     Family History   Problem Relation Age of Onset    Diabetes Mother     High Blood Pressure Mother     Stroke Mother     Cancer Father     Hearing Loss Father     High Blood Pressure Father      Past Medical History:   Diagnosis Date    Activity intolerance 06/06/2018    Allergic rhinitis due to other allergen     Depressive disorder, not elsewhere classified     Diabetes [facility]     Diverticulosis     H/O echocardiogram 09/07/2016    EF 55%. LV wall thickness is mildly increased. Left atrium is severely dilated (>40) left atrial volume index of 41 ml/m2. Mild mitral and treicuspid regurg. Moderate pulmonary hypertension estimated right ventricular systolic pressure of 42 mmHg. Moderate diastolic dysfunction is seen.     Hypertension     LAYO (obstructive sleep apnea) 12/14/2015    Other and unspecified hyperlipidemia     Other seborrheic keratosis     Pain in lower limb 06/06/2018    Peripheral vascular disease (HCC)     Right leg. Had ablation done 6/2014.    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 45/33/8031    Varicose veins of left lower extremity with pain 06/06/2018    Venous (peripheral) insufficiency 06/06/2018    Venous insufficiency of both lower extremities 06/06/2018     Current Outpatient Medications   Medication Sig Dispense Refill    metFORMIN (GLUCOPHAGE) 500 MG tablet Take 1 tablet by mouth twice daily 180 tablet 3    metoprolol succinate (TOPROL XL) 100 MG extended release tablet Take 1 tablet by mouth once daily 90 tablet 2    losartan (COZAAR) 100 MG tablet Take 1 tablet by mouth daily 90 tablet 3    rosuvastatin (CRESTOR) 5 MG tablet Take 1 tablet by mouth nightly 90 tablet 3    blood glucose test strips (FREESTYLE LITE) strip 1 each by In Vitro route daily [id number] NON-INSULIN DEPENDENT 100 each 3    glucose monitoring kit (FREESTYLE) monitoring kit 1 kit by Does not apply route daily 1 kit 0    levothyroxine (SYNTHROID) 50 MCG tablet TAKE 1 TABLET BY MOUTH ONCE DAILY IN THE MORNING ON AN EMPTY STOMACH 90 tablet 3    amLODIPine (NORVASC) 10 MG tablet TAKE 1 TABLET BY MOUTH ONCE DAILY 90 tablet 3    fluticasone (FLONASE) 50 MCG/ACT nasal spray USE TWO SPRAY(S) IN EACH NOSTRIL ONCE DAILY 1 Bottle 5    aspirin 81 MG tablet Take 162 mg by mouth daily      Chlorpheniramine Maleate (ALLERGY RELIEF PO) Take by mouth as needed      vitamin D 1000 UNITS CAPS Take by mouth daily D3      Blood Glucose Monitoring Suppl (FREESTYLE LITE) KORY       Lancets MISC 1 each by Does not apply route daily Type II diabetes mellitus 502 each 3    Garlic 355 MG TABS Take  by mouth daily.  Coenzyme Q10 (COQ10) 50 MG CAPS Take  by mouth daily.  Evening Primrose Oil 500 MG CAPS Take 1,000 mg by mouth daily       Multiple Vitamins-Minerals (THERAPEUTIC MULTIVITAMIN-MINERALS) tablet Take 1 tablet by mouth daily.  Acai Solorio 500 MG CAPS Take 1 tablet by mouth daily.  Calcium Citrate-Vitamin D (CITRACAL + D PO) Take by mouth daily       Omega-3 Fatty Acids (OMEGA 3 PO) Take 1 tablet by mouth daily       FLAXSEED OIL 1 tablet by Does not apply route daily. No current facility-administered medications for this visit. Facility-Administered Medications Ordered in Other Visits   Medication Dose Route Frequency Provider Last Rate Last Dose    sodium chloride flush 0.9 % injection 10 mL  10 mL Intravenous PRN Serena Burger MD        sodium chloride (PF) 0.9 % injection 10 mL  10 mL Intravenous PRN Sal Jaramillo MD             Physical findings:  General- no acute distress, oriented  HEENT - no xanthelasma, external ears normal  Neck- Supple, no thyromegaly  Skin- warm, dry, no skin breakdown or gangrene. Spider veins. Small to moderate varicose veins, nontender. Extremities - no edema       Pulses Left -  Popliteal: 2+, not enlarged        Assessment:  Encounter Diagnoses   Name Primary?  Venous insufficiency of both lower extremities Yes    Acute pain of left knee      Unknown cause of knee pain. Does not sound like LEONOR, Baker's cyst, or dvt. Resolved  Varicose veins, stable  Plan of care:  RTC 1 yr  Follow up and evaluate.        Electronically signed by Ras Peñaloza MD on 7/8/20 at 9:20 AM EDT

## 2020-07-08 NOTE — PATIENT INSTRUCTIONS
SURVEY:    You may be receiving a survey from [employer] regarding your visit today. Please complete the survey to enable us to provide the highest quality of care to you and your family. If you cannot score us a very good on any question, please call the office to discuss how we could have made your experience a very good one. Thank you.

## 2020-08-31 RX ORDER — FLUTICASONE PROPIONATE 50 MCG
SPRAY, SUSPENSION (ML) NASAL
Qty: 16 G | Refills: 0 | Status: SHIPPED | OUTPATIENT
Start: 2020-08-31 | End: 2021-03-04 | Stop reason: SDUPTHER

## 2020-09-18 ENCOUNTER — TELEPHONE (OUTPATIENT)
Dept: PULMONOLOGY | Age: 70
End: 2020-09-18

## 2020-10-01 LAB
ABSOLUTE BASO #: 0 X10E9/L (ref 0–0.2)
ABSOLUTE EOS #: 0.1 X10E9/L (ref 0–0.4)
ABSOLUTE LYMPH #: 1.5 X10E9/L (ref 1–3.5)
ABSOLUTE MONO #: 0.7 X10E9/L (ref 0–0.9)
ABSOLUTE NEUT #: 3.1 X10E9/L (ref 1.5–6.6)
BASOPHILS RELATIVE PERCENT: 0.8 %
EOSINOPHILS RELATIVE PERCENT: 2.6 %
HCT VFR BLD CALC: 46 % (ref 35–47)
HEMOGLOBIN: 15.4 G/DL (ref 11.7–15.5)
LYMPHOCYTE %: 27.6 %
MCH RBC QN AUTO: 29.8 PG (ref 27–34)
MCHC RBC AUTO-ENTMCNC: 33.4 G/DL (ref 32–36)
MCV RBC AUTO: 89 FL (ref 80–100)
MONOCYTES # BLD: 12 %
NEUTROPHILS RELATIVE PERCENT: 57 %
PDW BLD-RTO: 14.5 % (ref 11.5–15)
PLATELETS: 173 X10E9/L (ref 150–450)
PMV BLD AUTO: 9.5 FL (ref 7–12)
RBC: 5.16 X10E12/L (ref 3.8–5.2)
WBC: 5.5 X10E9/L (ref 4–11)

## 2020-10-06 ENCOUNTER — HOSPITAL ENCOUNTER (OUTPATIENT)
Dept: INFUSION THERAPY | Age: 70
Discharge: HOME | End: 2020-10-06
Payer: MEDICARE

## 2020-10-06 VITALS
TEMPERATURE: 98.6 F | RESPIRATION RATE: 16 BRPM | DIASTOLIC BLOOD PRESSURE: 89 MMHG | SYSTOLIC BLOOD PRESSURE: 139 MMHG | HEART RATE: 69 BPM

## 2020-10-06 DIAGNOSIS — D45 POLYCYTHEMIA VERA (HCC): Primary | ICD-10-CM

## 2020-10-06 PROCEDURE — 99195 PHLEBOTOMY: CPT

## 2020-10-06 RX ORDER — 0.9 % SODIUM CHLORIDE 0.9 %
250 INTRAVENOUS SOLUTION INTRAVENOUS ONCE
Status: CANCELLED | OUTPATIENT
Start: 2020-12-29

## 2020-10-06 RX ORDER — 0.9 % SODIUM CHLORIDE 0.9 %
500 INTRAVENOUS SOLUTION INTRAVENOUS ONCE
Status: CANCELLED | OUTPATIENT
Start: 2020-12-29

## 2020-10-06 NOTE — PLAN OF CARE
Problem: KNOWLEDGE DEFICIT  Goal: Patient/S.O. demonstrates understanding of disease process, treatment plan, medications, and discharge instructions.   Outcome: Met This Shift     Problem: Fluid Volume - Risk of, Imbalanced  Goal: Other  Description: To tolerate the phlebotomy with no signs or symptoms of fluid imbalance noted     Outcome: Met This Shift

## 2020-10-06 NOTE — PROGRESS NOTES
1303-Therapeutic phlebotomy initiated in left AC vein x1 attempt. 1313-500 ml blood collected. Needle removed, then pressure dressing placed. Pt tolerated procedure well without c/o. Drank 16 oz of water. See flowsheets for VS. 1342--Dressing to left AC site dry/intact. No c/o. Pt discharged ambulatory in stable condition.

## 2020-10-22 ENCOUNTER — OFFICE VISIT (OUTPATIENT)
Dept: ONCOLOGY | Age: 70
End: 2020-10-22
Payer: MEDICARE

## 2020-10-22 VITALS
TEMPERATURE: 97.8 F | DIASTOLIC BLOOD PRESSURE: 92 MMHG | WEIGHT: 228 LBS | SYSTOLIC BLOOD PRESSURE: 149 MMHG | HEIGHT: 70 IN | BODY MASS INDEX: 32.64 KG/M2 | HEART RATE: 65 BPM | RESPIRATION RATE: 18 BRPM

## 2020-10-22 PROCEDURE — 1090F PRES/ABSN URINE INCON ASSESS: CPT | Performed by: INTERNAL MEDICINE

## 2020-10-22 PROCEDURE — 3017F COLORECTAL CA SCREEN DOC REV: CPT | Performed by: INTERNAL MEDICINE

## 2020-10-22 PROCEDURE — 99214 OFFICE O/P EST MOD 30 MIN: CPT | Performed by: INTERNAL MEDICINE

## 2020-10-22 PROCEDURE — G8484 FLU IMMUNIZE NO ADMIN: HCPCS | Performed by: INTERNAL MEDICINE

## 2020-10-22 PROCEDURE — 1036F TOBACCO NON-USER: CPT | Performed by: INTERNAL MEDICINE

## 2020-10-22 PROCEDURE — 1123F ACP DISCUSS/DSCN MKR DOCD: CPT | Performed by: INTERNAL MEDICINE

## 2020-10-22 PROCEDURE — 4040F PNEUMOC VAC/ADMIN/RCVD: CPT | Performed by: INTERNAL MEDICINE

## 2020-10-22 PROCEDURE — 99211 OFF/OP EST MAY X REQ PHY/QHP: CPT | Performed by: INTERNAL MEDICINE

## 2020-10-22 PROCEDURE — G8427 DOCREV CUR MEDS BY ELIG CLIN: HCPCS | Performed by: INTERNAL MEDICINE

## 2020-10-22 PROCEDURE — G8399 PT W/DXA RESULTS DOCUMENT: HCPCS | Performed by: INTERNAL MEDICINE

## 2020-10-22 PROCEDURE — G8417 CALC BMI ABV UP PARAM F/U: HCPCS | Performed by: INTERNAL MEDICINE

## 2020-10-22 NOTE — PROGRESS NOTES
Reason for the visit:   Chief Complaint   Patient presents with    Follow-up     polycythemia       Pertinent Clinical Problems/ Treatments:  1. Polycythemia, initially diagnosed with polycythemia vera, but further workup suggested other possibilities,  2. Diagnosed with pulmonary hypertension, possibly explaining her symptoms and elevated hematocrit  3. Treated with periodic phlebotomies and aspirin as the phlebotomies helped her symptoms    Summary of the case/HPI :    She is 81-year-old patient who was diagnosed in 2004 with polycythemia vera, she was symptomatically the time in terms of headache and fatigue. She has been treated with therapeutic phlebotomies as well as aspirin since that time. Patient did not have any major events like thrombosis or stroke and did not qualify for cytoreductive therapy like hydroxyurea  She has been doing relatively well with the phlebotomy every couple of months to keep hematocrit is than 45  Further workup showed negative Carly Lux 2 mutation and normal erythropoietin. Pulmonary workup suggested pulmonary hypertension. We organized sleep study and she didn't do it yet to exclude obstructive sleep apnea although this is suspected clinically. The patient phlebotomies helped significantly so we arranged for periodic phlebotomies when her hematocrit is above 45. Interim HX:    Patient is here today for follow-up visit for polycythemia. Patient has been doing phlebotomy every 4 months. She is clinically stable. She feels better after the phlebotomy with less headaches. No dizziness. No TIA or CVA-like symptoms. Patient had no cardiac events. At the present time she feels fine with no symptoms.        Past Medical History   has a past medical history of Activity intolerance, Allergic rhinitis due to other allergen, Depressive disorder, not elsewhere classified, Diabetes ([address] Diverticulosis, H/O echocardiogram, Hypertension, LAYO (obstructive sleep apnea), Other and Extremities - peripheral pulses normal, no pedal edema, no clubbing or cyanosis   Skin - normal coloration and turgor, no rashes, no suspicious skin lesions noted          Lab Results   Component Value Date    WBC 5.5 10/01/2020    HGB 15.4 10/01/2020    HCT 46.0 10/01/2020    MCV 89 10/01/2020     10/01/2020       Chemistry        Component Value Date/Time     05/06/2019 0801    K 4.1 05/06/2019 0801     05/06/2019 0801    CO2 29 05/06/2019 0801    BUN 22 05/06/2019 0801    CREATININE 0.49 05/06/2019 0801        Component Value Date/Time    CALCIUM 9.2 05/06/2019 0801    AST 14 05/06/2019 0801    ALT 19 05/06/2019 0801    BILITOT NEGATIVE 05/06/2019 0801        DARRIUS 2 is negative  erythropoietin level is 14   Previous records were reviewed, her erythropoietin was never suppressed. Assessment:    1. Polycythemia, the patient diagnoses of polycythemia vera is in question. Darrius 2 mutation was negative. I think it's more likely related to pulmonary hypertension possible sleep apnea   2. The patient is managed with periodic phlebotomies to help with her symptoms   3. Type 2 diabetes, hypertension, under fair control        Plan:     1. Lab tests done outside facility were reviewed and discussed. Patient feels better after therapeutic phlebotomy. Labs are showing hemoglobin maintained at the upper limit of normal.  So my recommendation is to continue with the same schedule with phlebotomy every 4 months. 2. I'm not convinced that she has polycythemia vera. But she is insisting on the phlebotomies because the to help her symptoms with headache and fatigue   3. We will keep hematocrit around 45  4. she continues to have follow-up with pulmonary and having CPAP. 5. Continue full dose aspirin  6. We will see her back in 1 year for a follow-up. CBC will be done every 4 months. Patient will be seen sooner if she develops any problems.                               Tatiana Lazo MD

## 2020-11-02 ENCOUNTER — OFFICE VISIT (OUTPATIENT)
Dept: CARDIOLOGY | Age: 70
End: 2020-11-02
Payer: MEDICARE

## 2020-11-02 ENCOUNTER — OFFICE VISIT (OUTPATIENT)
Dept: PULMONOLOGY | Age: 70
End: 2020-11-02
Payer: MEDICARE

## 2020-11-02 VITALS
SYSTOLIC BLOOD PRESSURE: 143 MMHG | HEART RATE: 65 BPM | BODY MASS INDEX: 32.18 KG/M2 | RESPIRATION RATE: 20 BRPM | TEMPERATURE: 98.1 F | HEIGHT: 70 IN | DIASTOLIC BLOOD PRESSURE: 80 MMHG | OXYGEN SATURATION: 97 % | WEIGHT: 224.8 LBS

## 2020-11-02 VITALS
OXYGEN SATURATION: 95 % | HEIGHT: 70 IN | WEIGHT: 225.6 LBS | DIASTOLIC BLOOD PRESSURE: 81 MMHG | SYSTOLIC BLOOD PRESSURE: 133 MMHG | RESPIRATION RATE: 18 BRPM | BODY MASS INDEX: 32.3 KG/M2 | HEART RATE: 70 BPM

## 2020-11-02 PROBLEM — I50.32 CHRONIC DIASTOLIC HF (HEART FAILURE), NYHA CLASS 2 (HCC): Status: ACTIVE | Noted: 2020-11-02

## 2020-11-02 PROCEDURE — G8484 FLU IMMUNIZE NO ADMIN: HCPCS | Performed by: FAMILY MEDICINE

## 2020-11-02 PROCEDURE — G8399 PT W/DXA RESULTS DOCUMENT: HCPCS | Performed by: INTERNAL MEDICINE

## 2020-11-02 PROCEDURE — 1036F TOBACCO NON-USER: CPT | Performed by: INTERNAL MEDICINE

## 2020-11-02 PROCEDURE — 1090F PRES/ABSN URINE INCON ASSESS: CPT | Performed by: INTERNAL MEDICINE

## 2020-11-02 PROCEDURE — 1123F ACP DISCUSS/DSCN MKR DOCD: CPT | Performed by: FAMILY MEDICINE

## 2020-11-02 PROCEDURE — 99211 OFF/OP EST MAY X REQ PHY/QHP: CPT | Performed by: FAMILY MEDICINE

## 2020-11-02 PROCEDURE — 99214 OFFICE O/P EST MOD 30 MIN: CPT | Performed by: INTERNAL MEDICINE

## 2020-11-02 PROCEDURE — G8417 CALC BMI ABV UP PARAM F/U: HCPCS | Performed by: INTERNAL MEDICINE

## 2020-11-02 PROCEDURE — 1036F TOBACCO NON-USER: CPT | Performed by: FAMILY MEDICINE

## 2020-11-02 PROCEDURE — 99214 OFFICE O/P EST MOD 30 MIN: CPT

## 2020-11-02 PROCEDURE — G8427 DOCREV CUR MEDS BY ELIG CLIN: HCPCS | Performed by: FAMILY MEDICINE

## 2020-11-02 PROCEDURE — 93005 ELECTROCARDIOGRAM TRACING: CPT | Performed by: FAMILY MEDICINE

## 2020-11-02 PROCEDURE — G8417 CALC BMI ABV UP PARAM F/U: HCPCS | Performed by: FAMILY MEDICINE

## 2020-11-02 PROCEDURE — 1090F PRES/ABSN URINE INCON ASSESS: CPT | Performed by: FAMILY MEDICINE

## 2020-11-02 PROCEDURE — 3017F COLORECTAL CA SCREEN DOC REV: CPT | Performed by: FAMILY MEDICINE

## 2020-11-02 PROCEDURE — G8484 FLU IMMUNIZE NO ADMIN: HCPCS | Performed by: INTERNAL MEDICINE

## 2020-11-02 PROCEDURE — 1123F ACP DISCUSS/DSCN MKR DOCD: CPT | Performed by: INTERNAL MEDICINE

## 2020-11-02 PROCEDURE — 99214 OFFICE O/P EST MOD 30 MIN: CPT | Performed by: FAMILY MEDICINE

## 2020-11-02 PROCEDURE — 93010 ELECTROCARDIOGRAM REPORT: CPT | Performed by: FAMILY MEDICINE

## 2020-11-02 PROCEDURE — 4040F PNEUMOC VAC/ADMIN/RCVD: CPT | Performed by: INTERNAL MEDICINE

## 2020-11-02 PROCEDURE — 3017F COLORECTAL CA SCREEN DOC REV: CPT | Performed by: INTERNAL MEDICINE

## 2020-11-02 PROCEDURE — G8399 PT W/DXA RESULTS DOCUMENT: HCPCS | Performed by: FAMILY MEDICINE

## 2020-11-02 PROCEDURE — G8427 DOCREV CUR MEDS BY ELIG CLIN: HCPCS | Performed by: INTERNAL MEDICINE

## 2020-11-02 PROCEDURE — 4040F PNEUMOC VAC/ADMIN/RCVD: CPT | Performed by: FAMILY MEDICINE

## 2020-11-02 NOTE — PATIENT INSTRUCTIONS
people. · Wash your hands often, especially after you cough or sneeze. Use soap and water, and scrub for at least 20 seconds. If soap and water aren't available, use an alcohol-based hand . · Avoid touching your mouth, nose, and eyes. What can you do to avoid spreading the virus to others? To help avoid spreading the virus to others:  · Wash your hands often with soap or alcohol-based hand sanitizers. · Cover your mouth with a tissue when you cough or sneeze. Then throw the tissue in the trash. · Use a disinfectant to clean things that you touch often. These include doorknobs, remote controls, phones, and handles on your refrigerator and microwave. And don't forget countertops, tabletops, bathrooms, and computer keyboards. · Wear a cloth face cover if you have to go to public areas. If you know or suspect that you have COVID-19:  · Stay home. Don't go to school, work, or public areas. And don't use public transportation, ride-shares, or taxis unless you have no choice. · Leave your home only if you need to get medical care or testing. But call the doctor's office first so they know you're coming. And wear a face cover. · Limit contact with people in your home. If possible, stay in a separate bedroom and use a separate bathroom. · Wear a face cover whenever you're around other people. It can help stop the spread of the virus when you cough or sneeze. · Clean and disinfect your home every day. Use household  and disinfectant wipes or sprays. Take special care to clean things that you grab with your hands. · Self-isolate until it's safe to be around others again. ? If you have symptoms, it's safe when you haven't had a fever for 3 days and your symptoms have improved and it's been at least 10 days since your symptoms started. ? If you were exposed to the virus but don't have symptoms, it's safe to be around others 14 days after exposure.   ? Talk to your doctor about whether you also need testing, especially if you have a weakened immune system. When to call for help  Call 911 anytime you think you may need emergency care. For example, call if:  · You have severe trouble breathing. (You can't talk at all.)  · You have constant chest pain or pressure. · You are severely dizzy or lightheaded. · You are confused or can't think clearly. · Your face and lips have a blue color. · You passed out (lost consciousness) or are very hard to wake up. Call your doctor now if you develop symptoms such as:  · Shortness of breath. · Fever. · Cough. If you need to get care, call ahead to the doctor's office for instructions before you go. Make sure you wear a face cover to prevent exposing other people to the virus. Where can you get the latest information? The following health organizations are tracking and studying this virus. Their websites contain the most up-to-date information. Stephanie Mackey also learn what to do if you think you may have been exposed to the virus. · U.S. Centers for Disease Control and Prevention (CDC): The CDC provides updated news about the disease and travel advice. The website also tells you how to prevent the spread of infection. www.cdc.gov  · World Health Organization [facility]): WHO offers information about the virus outbreaks. WHO also has travel advice. www.who.int  Current as of: July 10, 2020               Content Version: 12.6  © 6598-7673 "[employer]. Care instructions adapted under license by [facility] ([facility]). If you have questions about a medical condition or this instruction, always ask your healthcare professional. [place] [address] any warranty or liability for your use of this information.

## 2020-11-02 NOTE — PROGRESS NOTES
Renee Vaughan am scribing for and in the presence of Elan Trujillo. Lupe RIGGS, MS, F.A.C.C..    Patient: Katarina Reagan  : 1950  Date of Visit: 2020    REASON FOR VISIT / CONSULTATION: Follow-up (Hx: mod lv hypertrophy, HTN, palps, pulm HTN, mild mitral regurg, HLD. pt is here for 1 year f/u. pt is doing well. wants to discuss metoprolol causing dizziness. Having palpitations Denies: CP, SOB)      Dear Dmitri Ferreira, APRN - CNP,      I had the pleasure of seeing your patient Katarina Reagan in consultation today. As you know, Ms. Anat Cohen is a 72 y.o. female with a history of polycythemia vera and pulmonary hypertension. Her echocardiogram on 2016 showed an ejection fraction of 55% with moderate pulmonary hypertension and severe left atrial enlargement (>40). Echocardiogram done on 2019 that showed EF of 55%. LV wall thickness is moderately increased. Moderate diastolic dysfunction is seen. Since the last time I saw Ms. Cortes she continues to do well. She does report having occasional palpitations especially when laying in her left side but says it is not very often and really does not bother her to much. She does not notice the palpitations during the day. She does have mild dizziness when she takes her metoprolol. She denied any current or recent chest pain, shortness of breath, abdominal pain, bleeding problems, problems with her medications or any other concerns at this time. Past Medical History:   Diagnosis Date    Activity intolerance 2018    Allergic rhinitis due to other allergen     Depressive disorder, not elsewhere classified     Diabetes ([facility] [address]     Diverticulosis     H/O echocardiogram 2016    EF 55%. LV wall thickness is mildly increased. Left atrium is severely dilated (>40) left atrial volume index of 41 ml/m2. Mild mitral and treicuspid regurg.   Moderate pulmonary hypertension estimated right ventricular systolic pressure of 42 mmHg. Moderate diastolic dysfunction is seen.  Hypertension     LAYO (obstructive sleep apnea) 12/14/2015    Other and unspecified hyperlipidemia     Other seborrheic keratosis     Pain in lower limb 06/06/2018    Peripheral vascular disease (HCC)     Right leg. Had ablation done 6/2014. 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 Pertinent positives and negatives as above, all else negative.      Past Surgical History:   Procedure Laterality Date    CHOLECYSTECTOMY      1976    COLONOSCOPY  10/10/2016    -diverticulosis,hemorrhoids    HYSTERECTOMY      2002    LEG SURGERY      left and right laser surgery    OTHER SURGICAL HISTORY Left 06/06/2018    sclerotherapy/Dr Nguyen/[facility]    OTHER SURGICAL HISTORY Left 03/20/2019    DR Gomez/[facility]/sclerotherapy with [facility]    Social History:  Social History     Tobacco Use    Smoking status: Never Smoker    Smokeless tobacco: Never Used   Substance Use Topics    Alcohol use: No    Drug use: Not on file        CURRENT MEDICATIONS:  Outpatient Medications Marked as Taking for the 11/2/20 encounter (Office Visit) with Liza Romero MD   Medication Sig Dispense Refill    losartan (COZAAR) 100 MG tablet Take 1 tablet by mouth daily 90 tablet 1    rosuvastatin (CRESTOR) 5 MG tablet Take 1 tablet by mouth nightly 90 tablet 1    blood glucose test strips (FREESTYLE LITE) strip 1 each by Does not apply route daily DX--E11.9 NON- INSULIN DEPENDENT 100 each 3    fluticasone (FLONASE) 50 MCG/ACT nasal spray Use 2 spray(s) in each nostril once daily 16 g 0    amLODIPine (NORVASC) 10 MG tablet Take 1 tablet by mouth once daily 90 tablet 3    levothyroxine (SYNTHROID) 50 MCG tablet TAKE 1 TABLET BY MOUTH ONCE DAILY IN THE MORNING ON AN EMPTY STOMACH 90 tablet 3    metFORMIN (GLUCOPHAGE) 500 MG tablet Take 1 tablet by mouth twice daily 180 tablet 3    metoprolol succinate (TOPROL XL) 100 MG extended release tablet Take 1 tablet by mouth once daily 90 tablet 2    glucose monitoring kit (FREESTYLE) monitoring kit 1 kit by Does not apply route daily 1 kit 0    aspirin 81 MG tablet Take 162 mg by mouth daily      Chlorpheniramine Maleate (ALLERGY RELIEF PO) Take by mouth as needed      vitamin D 1000 UNITS CAPS Take by mouth daily D3      Blood Glucose Monitoring Suppl (FREESTYLE LITE) KORY       Lancets MISC 1 each by Does not apply route daily Type II diabetes mellitus 075 each 3    Garlic 300 MG TABS Take  by mouth daily.  Coenzyme Q10 (COQ10) 50 MG CAPS Take  by mouth daily.  Multiple Vitamins-Minerals (THERAPEUTIC MULTIVITAMIN-MINERALS) tablet Take 1 tablet by mouth daily.  Acai Solorio 500 MG CAPS Take 1 tablet by mouth daily.  Calcium Citrate-Vitamin D (CITRACAL + D PO) Take by mouth daily       Omega-3 Fatty Acids (OMEGA 3 PO) Take 1 tablet by mouth daily       FLAXSEED OIL 1 tablet by Does not apply route daily. FAMILY HISTORY: family history includes Cancer in her father; Diabetes in her mother; Hearing Loss in her father; High Blood Pressure in her father and mother; Stroke in her mother.      PHYSICAL EXAM:   /81 (Site: Left Upper Arm, Position: Sitting, Cuff Size: Medium Adult)   Pulse 70   Resp 18   Ht 5' 10\" (1.778 m)   Wt 225 lb 9.6 oz (102.3 kg)   SpO2 95%   BMI 32.37 kg/m²  Body mass index is 32.37 kg/m². Constitutional: She is oriented to person, place, and time. She appears well-developed and well-nourished. In no acute distress. HEENT: Normocephalic and atraumatic. No JVD present. Carotid bruit is not present. No mass and no thyromegaly present. No lymphadenopathy present. Cardiovascular: Normal rate, regular rhythm, normal heart sounds. Exam reveals no gallop and no friction rubs. 1/6 systolic murmur, 5th intercostal space on the LEFT in the mid-clavicular line (cardiac apex). Pulmonary/Chest: Effort normal and breath sounds normal. No respiratory distress. She has no wheezes, rhonchi or rales. Abdominal: Soft, non-tender. Bowel sounds and aorta are normal. She exhibits no organomegaly, mass or bruit. Extremities: Trace-1+ 1/2 up to the knees bilaterally. No cyanosis or clubbing. 2+ radial and carotid pulses. Distal extremity pulses: 2+ bilaterally. Neurological: She is alert and oriented to person, place, and time. No evidence of gross cranial nerve deficit. Coordination appeared normal.   Skin: Skin is warm and dry. There is no rash or diaphoresis. Psychiatric: She has a normal mood and affect. Her speech is normal and behavior is normal.      MOST RECENT LABS ON RECORD:   Lab Results   Component Value Date    WBC 5.5 10/01/2020    HGB 15.4 10/01/2020    HCT 46.0 10/01/2020     10/01/2020    CHOL 97 (L) 10/14/2019    TRIG 79 10/14/2019    HDL 42 10/14/2019    ALT 19 05/06/2019    AST 14 05/06/2019     05/06/2019    K 4.1 05/06/2019     05/06/2019    CREATININE 0.49 05/06/2019    BUN 22 05/06/2019    CO2 29 05/06/2019    TSH 4.81 (H) 04/17/2018    LABA1C 7.1 12/23/2019       ASSESSMENT:  1. Intermittent palpitations    2. Pulmonary hypertension ([address]    3. Essential hypertension    4. Moderate left ventricular hypertrophy    5. Mild mitral regurgitation    6. Mild tricuspid regurgitation    7. Mixed hyperlipidemia         PLAN:    Chronic diastolic heart failure: New York Heart Association Class: IIa (Mild symptoms only in normal activities) Stable but some increased, mild lower extremity edema     Beta Blocker: Continue Metoprolol succinate (Toprol XL) 100 mg daily. ACE Inibitor/ARB: Continue losartan (Cozaar) 100 mg daily. Diuretics: Not indicated at this time. Heart failure counseling: I told them to start wearing lower extremity compression stockings and I advised them to try and keep their legs up whenever possible and to limit salt in their diet. Additional Testing List: None    · Lightheadedness/dizziness: Intermittent and mild. Likely just orthostatic hypotension due to dehydration, told her to check BP and HR next time and call if low  · Pharmacologic Therapy: Not indicated at this time. · Nonpharmacologic counseling: Because of her condition, I reminded her to try and keep herself well-hydrated and to take extra time when moving from laying to sitting, sitting to standing and standing to walking. I also explained to her to help improve her symptoms she should include 3 g sodium diet, 1 or 2 L of sports drinks daily, knee-high compressions stockings. · Intermittent Palpitations: Mostly when laying in bed, Currently well controlled and only mildly bothersome  · Non-Pharmacological Management: I advised Ms. Cortes to continue to try and avoid caffeine including dark chocolate as this is known to increase arrhythmias but at this point no additional testing or treatment is likely indicated. · Medical Management: Continue Toprol  mg daily. · Additional Testing List: I ordered a CAM monitor to try and pinpoint the etiology of their symptoms. I do not want her to get this done unless her symptoms persist or worsen. · Pulmonary hypertension: More mild on recent echo-Continue aggressive blood pressure control.   · Beta Blocker: Continue metoprolol succinate (Toprol XL) 100 mg once daily. · Calcium Channel Blocker: Continue amlodipine (Norvasc)      · ACE Inibitor/ARB: Continue losartan (Cozaar) 100 mg once daily     Essential Hypertension with moderate LVH seen on echo and ECG: Borderline Controlled  · ACE Inibitor/ARB: Continue losartan (Cozaar) 100 mg once daily. I also discussed the potential side effects of this medication including lightheadedness and dizziness and told her to stop the medication of this occurs and call our office if this occurs. · Calcium Channel Blocker: Continue amlodipine (Norvasc) 10 mg daily. · Beta Blocker: Continue metoprolol succinate (Toprol XL) 100 mg once daily. · History of mild mitral and tricuspid regurgitation with severe left atrial enlargement >40:   · Counseling: Consider repeat echo in 1-2 years     · Hyperlipidemia: Mixed  · Statin Therapy: Continue rosuvastatin (Crestor) 5 mg nightly. Finally, I recommended that she continue her other medications and follow up with you as previously scheduled. FOLLOW UP:   I told Ms. Cortes to call my office if she had any problems, but otherwise will plan on seeing her again Return in about 1 year (around 11/2/2021). However, I would be happy to see her sooner should the need arise. Once again, thank you for allowing me to participate in this patients care. Please do not hesitate to contact me could I be of further assistance. Sincerely,  Harlen Goldberg. Lupe RIGGS, MS, F.A.C.C. [facility] Specialist  [address] [place] [address], [address] [place] [address]  Phone: [phone], Fax: [phone]    I believe that the risk of significant morbidity and mortality related to the patient's current medical conditions are: Intermediate. The documentation recorded by the scribe, accurately and completely reflects the services I personally performed and the decisions made by me. Alanna Workman MD, MS, F.A.C.C.  November 2, 2020

## 2020-11-02 NOTE — PROGRESS NOTES
ablation done 6/2014.    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 75/65/4789    Varicose veins of left lower extremity with pain 06/06/2018    Venous (peripheral) insufficiency 06/06/2018    Venous insufficiency of both lower extremities 06/06/2018       SURGICAL HISTORY:    Past Surgical History:   Procedure Laterality Date    CHOLECYSTECTOMY      1976    COLONOSCOPY  10/10/2016    -diverticulosis,hemorrhoids    HYSTERECTOMY      2002    LEG SURGERY      left and right laser surgery    OTHER SURGICAL HISTORY Left 06/06/2018    sclerotherapy/Dr Nguyen/[facility]    OTHER SURGICAL HISTORY Left 03/20/2019    DR Gomez/[place]/sclerotherapy with Asclera       ALLERGIES:    Allergies   Allergen Reactions    Seasonal     Tape [place] Tape] Rash       MEDICATIONS:   Current Outpatient Medications   Medication Sig Dispense Refill    losartan (COZAAR) 100 MG tablet Take 1 tablet by mouth daily 90 tablet 1    rosuvastatin (CRESTOR) 5 MG tablet Take 1 tablet by mouth nightly 90 tablet 1    fluticasone (FLONASE) 50 MCG/ACT nasal spray Use 2 spray(s) in each nostril once daily 16 g 0    amLODIPine (NORVASC) 10 MG tablet Take 1 tablet by mouth once daily 90 tablet 3    levothyroxine (SYNTHROID) 50 MCG tablet TAKE 1 TABLET BY MOUTH ONCE DAILY IN THE MORNING ON AN EMPTY STOMACH 90 tablet 3    metFORMIN (GLUCOPHAGE) 500 MG tablet Take 1 tablet by mouth twice daily 180 tablet 3    metoprolol succinate (TOPROL XL) 100 MG extended release tablet Take 1 tablet by mouth once daily 90 tablet 2    aspirin 81 MG tablet Take 162 mg by mouth daily      Chlorpheniramine Maleate (ALLERGY RELIEF PO) Take by mouth as needed      vitamin D 1000 UNITS CAPS Take by mouth daily D3      Garlic 190 MG TABS Take  by mouth daily.  Coenzyme Q10 (COQ10) 50 MG CAPS Take  by mouth daily.  Multiple Vitamins-Minerals (THERAPEUTIC MULTIVITAMIN-MINERALS) tablet Take 1 tablet by mouth daily.  Acai Solorio 500 MG CAPS Take 1 tablet by mouth daily.  Calcium Citrate-Vitamin D (CITRACAL + D PO) Take by mouth daily       Omega-3 Fatty Acids (OMEGA 3 PO) Take 1 tablet by mouth daily       FLAXSEED OIL Take 1 tablet by mouth daily       blood glucose test strips (FREESTYLE LITE) strip 1 each by Does not apply route daily DX--E11.9 NON- INSULIN DEPENDENT 100 each 3    glucose monitoring kit (FREESTYLE) monitoring kit 1 kit by Does not apply route daily 1 kit 0    Blood Glucose Monitoring Suppl (FREESTYLE LITE) KORY       Lancets MISC 1 each by Does not apply route daily Type II diabetes mellitus 100 each 3     No current facility-administered medications for this visit. Facility-Administered Medications Ordered in Other Visits   Medication Dose Route Frequency Provider Last Rate Last Dose    sodium chloride flush 0.9 % injection 10 mL  10 mL Intravenous PRN Serena Reveles MD        sodium chloride (PF) 0.9 % injection 10 mL  10 mL Intravenous PRN Brady Jackson MD           FAMILY HISTORY: family history includes Cancer in her father; Diabetes in her mother; Hearing Loss in her father; High Blood Pressure in her father and mother; Stroke in her mother. SOCIAL AND OCCUPATIONAL HEALTH:  The patient is a Never smoker. There  is not history of TB or TB exposure. There is not asbestos or silica dust exposure. The patient reports does not have coal, foundry, quarry or [employer] exposure.   Travel history reveals no significant history of risk factors for pulm disease. There is not  history of recreational or IV drug use. The patient does not pets, dogs, cats turtles or exotic birds. Review of Systems:  Review of Systems -   General ROS: Completed and except as mentioned above were negative   Psychological ROS:  Completed and except as mentioned above were negative  Ophthalmic ROS:  Completed and except as mentioned above were negative  ENT ROS:  Completed and except as mentioned above were negative  Allergy and Immunology ROS:  Completed and except as mentioned above were negative  Hematological and Lymphatic ROS:  Completed and except as mentioned above were negative  Endocrine ROS: Completed and except as mentioned above were negative  Breast ROS:  Completed and except as mentioned above were negative  Respiratory ROS:  Completed and except as mentioned above were negative  Cardiovascular ROS:  Completed and except as mentioned above were negative  Gastrointestinal ROS: Completed and except as mentioned above were negative  Genito-Urinary ROS:  Completed and except as mentioned above were negative  Musculoskeletal ROS:  Completed and except as mentioned above were negative  Neurological ROS:  Completed and except as mentioned above were negative  Dermatological ROS:  Completed and except as mentioned above were negative    SLEEP  No epistaxis or sore throat. Patient has been using her CPAP machine with improvement in the daytime sleepiness and fatigue and tiredness  No MVA. No edema.       PHYSICAL EXAMINATION:  Vitals:    11/02/20 1000 11/02/20 1005   BP: (!) 145/79 (!) 143/80   Site: Left Upper Arm Left Upper Arm   Position: Sitting Sitting   Cuff Size: Medium Adult Medium Adult   Pulse: 71 65   Resp: 20    Temp: 98.1 °F (36.7 °C)    TempSrc: Tympanic    SpO2: 97%    Weight: 224 lb 12.8 oz (102 kg)    Height: 5' 10\" (1.778 m)      PHYSICAL EXAMINATION:  Vitals:    11/02/20 1000 11/02/20 1005   BP: (!) 145/79 (!) 143/80   Site: Left Upper Arm Left Upper Arm   Position: Sitting Sitting   Cuff Size: Medium Adult Medium Adult   Pulse: 71 65   Resp: 20    Temp: 98.1 °F (36.7 °C)    TempSrc: Tympanic    SpO2: 97%    Weight: 224 lb 12.8 oz (102 kg)    Height: 5' 10\" (1.778 m)      Constitutional: This is a well developed, well nourished, 30-34.9 - Obesity Grade I 72y.o. year old female who is alert, oriented, cooperative and in no apparent distress. Head:normocephalic and atraumatic. EENT:  CARLOS. No conjunctival injections. Septum was midline, mucosa was without erythema, exudates or cobblestoning. No thrush was noted. MallampatiIII (soft palate, base of uvula visible)  Neck: Supple without thyromegaly. No elevated JVP. Trachea was midline. Respiratory: Chest was symmetrical without dullness to percussion. Breath sounds bilaterally were clear to auscultation. There were no wheezes, rhonchi or rales. There is no intercostal retraction or use of accessory muscles. No egophony noted. Cardiovascular: Regular without murmur, clicks, gallops or rubs. Abdomen: Slightly rounded and soft without organomegaly. No rebound, rigidity or guarding was appreciated. Lymphatic: No lymphadenopathy. Musculoskeletal: Normal curvature of the spine. No gross muscle weakness. Extremities: Has bilateral lower extremity edema and patient did not wear stockings, no ulcerations, tenderness, varicosities or erythema. Muscle size, tone and strength are normal.  No involuntary movements are noted. Skin:  Warm and dry. Good color, turgor and pigmentation. No lesions or scars.   No cyanosis or clubbing  Neurological/Psychiatric: The patient's general behavior, level of consciousness, thought content and emotional status is normal.          DATA:     Ventilation/perfusion scan was a very low probability for pulmonary embolism  Chest x-ray done simultaneously did not show any acute process  Echocardiogram showed grade 2 diastolic dysfunction without pulmonary artery hypertension  Polysomnogram showed evidence of mild sleep apnea with an AHI of 7 without any leg movements  Her last hematocrit was 43.5    Her compliance was 37% for usage of more than 4 hours with auto CPAP machine with pressure range of 5 to 15 cm of water with a maximum pressure of 14.1 cm of water with an AHI of 2.9    Nocturnal recording oximetry on 8/13/2020 did not show any significant desaturation during sleep with CPAP therapy    IMPRESSION:   1. LAYO (obstructive sleep apnea)    2. Post-nasal drip    3. Obesity due to excess calories, unspecified obesity severity    4. DVT femoral (deep venous thrombosis) with thrombophlebitis, left ([facility])    5. Chronic diastolic (congestive) heart failure ([facility])    6. Pulmonary hypertension ([address]    7. Polycythemia vera ([facility])                   PLAN:         Refills were provided-full facemask. Patient to trial different masks and see which fits her well. Also to use nasal bridge pad for better cushioning  Educated and clarified the medication use. Continue using Flonase  Recommended exercising with a gradual increase in the capability  Continue using allergy medications  Lina Jasso received counseling on the following healthy behaviors: nutrition, exercise and medication adherence  Recommend flu vaccination in the fall annually. She had flu vaccination for the season  Recommendations given regarding pneumococcal vaccinations. Patient is up-to-date with vaccinations from pulmonary perspective. Maintain an active lifestyle. All the questions that the patient and the family has had were answered to their satisfaction. Home O2 evaluation to be done. Supplemental oxygen was not needed  After reviewing the patient's smoking history and her age patient does not meet the criteria for lung cancer screening. Auto CPAP machine with a range of 5 to 15 cm of water to be used every night for at least 4 hours  Weight loss was recommended and discussed.   Recommended

## 2020-11-12 ENCOUNTER — OFFICE VISIT (OUTPATIENT)
Dept: SURGERY | Age: 70
End: 2020-11-12
Payer: MEDICARE

## 2020-11-12 ENCOUNTER — HOSPITAL ENCOUNTER (OUTPATIENT)
Dept: PREADMISSION TESTING | Age: 70
Setting detail: SPECIMEN
Discharge: HOME | End: 2020-11-16
Payer: MEDICARE

## 2020-11-12 VITALS
HEART RATE: 62 BPM | BODY MASS INDEX: 32.44 KG/M2 | TEMPERATURE: 98.8 F | DIASTOLIC BLOOD PRESSURE: 91 MMHG | SYSTOLIC BLOOD PRESSURE: 157 MMHG | HEIGHT: 70 IN | WEIGHT: 226.6 LBS | RESPIRATION RATE: 18 BRPM

## 2020-11-12 PROCEDURE — G8427 DOCREV CUR MEDS BY ELIG CLIN: HCPCS | Performed by: SURGERY

## 2020-11-12 PROCEDURE — 99202 OFFICE O/P NEW SF 15 MIN: CPT | Performed by: SURGERY

## 2020-11-12 PROCEDURE — 4040F PNEUMOC VAC/ADMIN/RCVD: CPT | Performed by: SURGERY

## 2020-11-12 PROCEDURE — C9803 HOPD COVID-19 SPEC COLLECT: HCPCS

## 2020-11-12 PROCEDURE — G8399 PT W/DXA RESULTS DOCUMENT: HCPCS | Performed by: SURGERY

## 2020-11-12 PROCEDURE — 1090F PRES/ABSN URINE INCON ASSESS: CPT | Performed by: SURGERY

## 2020-11-12 PROCEDURE — 1036F TOBACCO NON-USER: CPT | Performed by: SURGERY

## 2020-11-12 PROCEDURE — 3017F COLORECTAL CA SCREEN DOC REV: CPT | Performed by: SURGERY

## 2020-11-12 PROCEDURE — U0003 INFECTIOUS AGENT DETECTION BY NUCLEIC ACID (DNA OR RNA); SEVERE ACUTE RESPIRATORY SYNDROME CORONAVIRUS 2 (SARS-COV-2) (CORONAVIRUS DISEASE [COVID-19]), AMPLIFIED PROBE TECHNIQUE, MAKING USE OF HIGH THROUGHPUT TECHNOLOGIES AS DESCRIBED BY CMS-2020-01-R: HCPCS

## 2020-11-12 PROCEDURE — 1123F ACP DISCUSS/DSCN MKR DOCD: CPT | Performed by: SURGERY

## 2020-11-12 PROCEDURE — G8484 FLU IMMUNIZE NO ADMIN: HCPCS | Performed by: SURGERY

## 2020-11-12 PROCEDURE — G8417 CALC BMI ABV UP PARAM F/U: HCPCS | Performed by: SURGERY

## 2020-11-12 PROCEDURE — 99211 OFF/OP EST MAY X REQ PHY/QHP: CPT | Performed by: SURGERY

## 2020-11-12 NOTE — PROGRESS NOTES
GENERAL SURGERY CONSULTATION / OFFICE VISIT      Patient's Name/ Date of Birth/ Gender: Jesus Manuel Estes / 1950 (29 y.o.) / female     PCP: MATT Madrigal - CNP    History of present Illness:  Patient is a pleasant 72 y.o. female  kindly referred for symptomatic chronic lipoma lower mid/left back, increasing in size, painful, difficult to lay flat or sit comfortably for long periods of time. On daily aspirin. No previous surgery at the site. Past Medical History:  has a past medical history of Activity intolerance, Allergic rhinitis due to other allergen, Depressive disorder, not elsewhere classified, Diabetes ([address] Diverticulosis, H/O echocardiogram, Hypertension, LAYO (obstructive sleep apnea), Other and unspecified hyperlipidemia, Other seborrheic keratosis, Pain in lower limb, Peripheral vascular disease ([address] Polycythemia vera(238.4), Polycythemia vera(238.4), Supraventricular tachycardia ([address] Type II or unspecified type diabetes mellitus without mention of complication, not stated as uncontrolled, Type II or unspecified type diabetes mellitus without mention of complication, not stated as uncontrolled, Undiagnosed cardiac murmurs, Unspecified essential hypertension, Unspecified hypothyroidism, 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 Past Surgical History:   Past Surgical History:   Procedure Laterality Date    CHOLECYSTECTOMY      1976    COLONOSCOPY  10/10/2016    -diverticulosis,hemorrhoids    HYSTERECTOMY      2002    LEG SURGERY      left and right laser surgery    OTHER SURGICAL HISTORY Left 06/06/2018    sclerotherapy/Dr Nguyen/[facility]    OTHER SURGICAL HISTORY Left 03/20/2019    DR Gomez/[place]/sclerotherapy with Asclera       Social History:  reports that she has never smoked.  She 0    aspirin 81 MG tablet, Take 162 mg by mouth daily, Disp: , Rfl:     Chlorpheniramine Maleate (ALLERGY RELIEF PO), Take by mouth as needed, Disp: , Rfl:     vitamin D 1000 UNITS CAPS, Take by mouth daily D3, Disp: , Rfl:     Blood Glucose Monitoring Suppl (FREESTYLE LITE) KORY, , Disp: , Rfl:     Lancets MISC, 1 each by Does not apply route daily Type II diabetes mellitus, Disp: 100 each, Rfl: 3    Garlic 355 MG TABS, Take  by mouth daily. , Disp: , Rfl:     Coenzyme Q10 (COQ10) 50 MG CAPS, Take  by mouth daily. , Disp: , Rfl:     Multiple Vitamins-Minerals (THERAPEUTIC MULTIVITAMIN-MINERALS) tablet, Take 1 tablet by mouth daily. , Disp: , Rfl:     Acai Berry 500 MG CAPS, Take 1 tablet by mouth daily. , Disp: , Rfl:     Calcium Citrate-Vitamin D (CITRACAL + D PO), Take by mouth daily , Disp: , Rfl:     Omega-3 Fatty Acids (OMEGA 3 PO), Take 1 tablet by mouth daily , Disp: , Rfl:     FLAXSEED OIL, Take 1 tablet by mouth daily , Disp: , Rfl:     Physical Exam:  Vital signs and Nurse's note reviewed  Gen:  A&Ox3, NAD. Pleasant and cooperative. HEENT: PERRLA, EOMI, no scleral icterus  Neck:  no goiter  CVS: Regular rate   Resp: Good bilateral air entry, no active wheezing, no labored breathing  Abd: soft, non-tender, non-distended.  Left lower mid back large lipoma 8x6 cm  Ext: Moves all extremities, no gross focal motor deficits  Skin: No erythema or ulcerations     Labs:   Lab Results   Component Value Date    WBC 5.5 10/01/2020    WBC 5.7 12/02/2015    HGB 15.4 10/01/2020    HCT 46.0 10/01/2020    MCV 89 10/01/2020     10/01/2020     12/02/2015     04/17/2012     Lab Results   Component Value Date     11/13/2020    K 4.1 11/13/2020     11/13/2020    CO2 27 11/13/2020    BUN 18 11/13/2020    CREATININE 0.57 11/13/2020    GLUCOSE 168 11/13/2020    CALCIUM 9.5 11/13/2020     Lab Results   Component Value Date    ALKPHOS 70 11/13/2020    ALT 22 11/13/2020    AST 18 11/13/2020 PROT 6.4 11/13/2020    BILITOT 1.3 11/13/2020    BILITOT NEGATIVE 05/06/2019    LABALBU 4.3 11/13/2020       Impressions/Recommendations:       Large left sided back lower lipoma 8 x 6 cm. Hold asa 7 days prior. Right side down on cart. General. Already had EKG. BMP ordered and normal. Scanned into media 11/17/20. Thank you MATT Doran - CNP for allowing me to participate in the care of your patients.      Stas Lawrence DO, MPH, [address] [place] [address] Rd office [phone]  [place] office [phone]

## 2020-11-13 LAB
SARS-COV-2, RAPID: NORMAL
SARS-COV-2: NORMAL
SARS-COV-2: NOT DETECTED
SOURCE: NORMAL

## 2020-11-13 NOTE — PROGRESS NOTES
Patient instructed on the pre-operative, intra-operative, and post-operative process. Patient's surgery arrival time to the hospital and surgery start time confirmed for the day of surgery. Patient instructed on NPO status. Medication instructions reviewed with patient. Pre operative instruction sheet reviewed and given to patient via telephone. Pt instructed to stop taking asa and all asa products and non prescription vitamins as of today. Pt instructed to take metoprolol, amlodipine, and synthroid the morning of surgery with a sip of water only.  Pt states had covid test yesterday

## 2020-11-18 ENCOUNTER — ANESTHESIA EVENT (OUTPATIENT)
Dept: OPERATING ROOM | Age: 70
End: 2020-11-18
Payer: MEDICARE

## 2020-11-18 PROCEDURE — 21933 EXC BACK TUM DEEP 5 CM/>: CPT | Performed by: SURGERY

## 2020-11-19 ENCOUNTER — ANESTHESIA (OUTPATIENT)
Dept: OPERATING ROOM | Age: 70
End: 2020-11-19
Payer: MEDICARE

## 2020-11-19 ENCOUNTER — HOSPITAL ENCOUNTER (OUTPATIENT)
Age: 70
Setting detail: OUTPATIENT SURGERY
Discharge: HOME | End: 2020-11-19
Attending: SURGERY | Admitting: SURGERY
Payer: MEDICARE

## 2020-11-19 VITALS — DIASTOLIC BLOOD PRESSURE: 59 MMHG | OXYGEN SATURATION: 99 % | SYSTOLIC BLOOD PRESSURE: 120 MMHG

## 2020-11-19 VITALS
RESPIRATION RATE: 16 BRPM | DIASTOLIC BLOOD PRESSURE: 59 MMHG | BODY MASS INDEX: 32.07 KG/M2 | HEIGHT: 70 IN | HEART RATE: 66 BPM | TEMPERATURE: 97.9 F | OXYGEN SATURATION: 96 % | WEIGHT: 224 LBS | SYSTOLIC BLOOD PRESSURE: 155 MMHG

## 2020-11-19 LAB — GLUCOSE BLD-MCNC: 144 MG/DL (ref 74–100)

## 2020-11-19 PROCEDURE — 2580000003 HC RX 258: Performed by: SURGERY

## 2020-11-19 PROCEDURE — 6360000002 HC RX W HCPCS: Performed by: NURSE ANESTHETIST, CERTIFIED REGISTERED

## 2020-11-19 PROCEDURE — 2780000010 HC IMPLANT OTHER: Performed by: SURGERY

## 2020-11-19 PROCEDURE — 88304 TISSUE EXAM BY PATHOLOGIST: CPT

## 2020-11-19 PROCEDURE — 2500000003 HC RX 250 WO HCPCS: Performed by: NURSE ANESTHETIST, CERTIFIED REGISTERED

## 2020-11-19 PROCEDURE — 2709999900 HC NON-CHARGEABLE SUPPLY: Performed by: SURGERY

## 2020-11-19 PROCEDURE — 3600000002 HC SURGERY LEVEL 2 BASE: Performed by: SURGERY

## 2020-11-19 PROCEDURE — 3700000000 HC ANESTHESIA ATTENDED CARE: Performed by: SURGERY

## 2020-11-19 PROCEDURE — 82947 ASSAY GLUCOSE BLOOD QUANT: CPT

## 2020-11-19 PROCEDURE — 7100000011 HC PHASE II RECOVERY - ADDTL 15 MIN: Performed by: SURGERY

## 2020-11-19 PROCEDURE — 7100000010 HC PHASE II RECOVERY - FIRST 15 MIN: Performed by: SURGERY

## 2020-11-19 PROCEDURE — 3600000012 HC SURGERY LEVEL 2 ADDTL 15MIN: Performed by: SURGERY

## 2020-11-19 PROCEDURE — 3700000001 HC ADD 15 MINUTES (ANESTHESIA): Performed by: SURGERY

## 2020-11-19 PROCEDURE — 6360000002 HC RX W HCPCS: Performed by: SURGERY

## 2020-11-19 PROCEDURE — 2500000003 HC RX 250 WO HCPCS: Performed by: SURGERY

## 2020-11-19 RX ORDER — PROPOFOL 10 MG/ML
INJECTION, EMULSION INTRAVENOUS PRN
Status: DISCONTINUED | OUTPATIENT
Start: 2020-11-19 | End: 2020-11-19 | Stop reason: SDUPTHER

## 2020-11-19 RX ORDER — LIDOCAINE HYDROCHLORIDE 10 MG/ML
1 INJECTION, SOLUTION EPIDURAL; INFILTRATION; INTRACAUDAL; PERINEURAL
Status: DISCONTINUED | OUTPATIENT
Start: 2020-11-19 | End: 2020-11-19 | Stop reason: HOSPADM

## 2020-11-19 RX ORDER — HYDROCODONE BITARTRATE AND ACETAMINOPHEN 5; 325 MG/1; MG/1
2 TABLET ORAL PRN
Status: DISCONTINUED | OUTPATIENT
Start: 2020-11-19 | End: 2020-11-19 | Stop reason: HOSPADM

## 2020-11-19 RX ORDER — SODIUM CHLORIDE, SODIUM LACTATE, POTASSIUM CHLORIDE, CALCIUM CHLORIDE 600; 310; 30; 20 MG/100ML; MG/100ML; MG/100ML; MG/100ML
INJECTION, SOLUTION INTRAVENOUS CONTINUOUS
Status: DISCONTINUED | OUTPATIENT
Start: 2020-11-19 | End: 2020-11-19 | Stop reason: HOSPADM

## 2020-11-19 RX ORDER — FENTANYL CITRATE 50 UG/ML
50 INJECTION, SOLUTION INTRAMUSCULAR; INTRAVENOUS EVERY 5 MIN PRN
Status: DISCONTINUED | OUTPATIENT
Start: 2020-11-19 | End: 2020-11-19 | Stop reason: HOSPADM

## 2020-11-19 RX ORDER — HYDROCODONE BITARTRATE AND ACETAMINOPHEN 5; 325 MG/1; MG/1
1 TABLET ORAL EVERY 8 HOURS PRN
Qty: 6 TABLET | Refills: 0 | Status: SHIPPED | OUTPATIENT
Start: 2020-11-19 | End: 2020-11-21

## 2020-11-19 RX ORDER — BUPIVACAINE HYDROCHLORIDE AND EPINEPHRINE 2.5; 5 MG/ML; UG/ML
INJECTION, SOLUTION EPIDURAL; INFILTRATION; INTRACAUDAL; PERINEURAL PRN
Status: DISCONTINUED | OUTPATIENT
Start: 2020-11-19 | End: 2020-11-19 | Stop reason: ALTCHOICE

## 2020-11-19 RX ORDER — DEXAMETHASONE SODIUM PHOSPHATE 4 MG/ML
INJECTION, SOLUTION INTRA-ARTICULAR; INTRALESIONAL; INTRAMUSCULAR; INTRAVENOUS; SOFT TISSUE PRN
Status: DISCONTINUED | OUTPATIENT
Start: 2020-11-19 | End: 2020-11-19 | Stop reason: SDUPTHER

## 2020-11-19 RX ORDER — PROMETHAZINE HYDROCHLORIDE 25 MG/ML
6.25 INJECTION, SOLUTION INTRAMUSCULAR; INTRAVENOUS
Status: DISCONTINUED | OUTPATIENT
Start: 2020-11-19 | End: 2020-11-19 | Stop reason: HOSPADM

## 2020-11-19 RX ORDER — ONDANSETRON 2 MG/ML
INJECTION INTRAMUSCULAR; INTRAVENOUS PRN
Status: DISCONTINUED | OUTPATIENT
Start: 2020-11-19 | End: 2020-11-19 | Stop reason: SDUPTHER

## 2020-11-19 RX ORDER — KETOROLAC TROMETHAMINE 30 MG/ML
INJECTION, SOLUTION INTRAMUSCULAR; INTRAVENOUS PRN
Status: DISCONTINUED | OUTPATIENT
Start: 2020-11-19 | End: 2020-11-19 | Stop reason: SDUPTHER

## 2020-11-19 RX ORDER — FENTANYL CITRATE 50 UG/ML
25 INJECTION, SOLUTION INTRAMUSCULAR; INTRAVENOUS EVERY 5 MIN PRN
Status: DISCONTINUED | OUTPATIENT
Start: 2020-11-19 | End: 2020-11-19 | Stop reason: HOSPADM

## 2020-11-19 RX ORDER — METOCLOPRAMIDE HYDROCHLORIDE 5 MG/ML
10 INJECTION INTRAMUSCULAR; INTRAVENOUS
Status: DISCONTINUED | OUTPATIENT
Start: 2020-11-19 | End: 2020-11-19 | Stop reason: HOSPADM

## 2020-11-19 RX ORDER — HYDROCODONE BITARTRATE AND ACETAMINOPHEN 5; 325 MG/1; MG/1
1 TABLET ORAL PRN
Status: DISCONTINUED | OUTPATIENT
Start: 2020-11-19 | End: 2020-11-19 | Stop reason: HOSPADM

## 2020-11-19 RX ORDER — LIDOCAINE HYDROCHLORIDE 20 MG/ML
INJECTION, SOLUTION EPIDURAL; INFILTRATION; INTRACAUDAL; PERINEURAL PRN
Status: DISCONTINUED | OUTPATIENT
Start: 2020-11-19 | End: 2020-11-19 | Stop reason: SDUPTHER

## 2020-11-19 RX ADMIN — SODIUM CHLORIDE, POTASSIUM CHLORIDE, SODIUM LACTATE AND CALCIUM CHLORIDE: 600; 310; 30; 20 INJECTION, SOLUTION INTRAVENOUS at 09:50

## 2020-11-19 RX ADMIN — PROPOFOL 200 MG: 10 INJECTION, EMULSION INTRAVENOUS at 11:17

## 2020-11-19 RX ADMIN — LIDOCAINE HYDROCHLORIDE 100 MG: 20 INJECTION, SOLUTION EPIDURAL; INFILTRATION; INTRACAUDAL; PERINEURAL at 11:17

## 2020-11-19 RX ADMIN — SODIUM CHLORIDE, POTASSIUM CHLORIDE, SODIUM LACTATE AND CALCIUM CHLORIDE: 600; 310; 30; 20 INJECTION, SOLUTION INTRAVENOUS at 11:11

## 2020-11-19 RX ADMIN — ONDANSETRON 4 MG: 2 INJECTION INTRAMUSCULAR; INTRAVENOUS at 11:30

## 2020-11-19 RX ADMIN — DEXTROSE MONOHYDRATE 2 G: 50 INJECTION, SOLUTION INTRAVENOUS at 11:11

## 2020-11-19 RX ADMIN — DEXAMETHASONE SODIUM PHOSPHATE 8 MG: 4 INJECTION, SOLUTION INTRAMUSCULAR; INTRAVENOUS at 11:30

## 2020-11-19 RX ADMIN — KETOROLAC TROMETHAMINE 15 MG: 30 INJECTION, SOLUTION INTRAMUSCULAR; INTRAVENOUS at 11:30

## 2020-11-19 ASSESSMENT — PAIN SCALES - GENERAL
PAINLEVEL_OUTOF10: 0

## 2020-11-19 ASSESSMENT — PAIN - FUNCTIONAL ASSESSMENT: PAIN_FUNCTIONAL_ASSESSMENT: 0-10

## 2020-11-19 ASSESSMENT — PAIN DESCRIPTION - DESCRIPTORS: DESCRIPTORS: ACHING

## 2020-11-19 NOTE — ANESTHESIA POSTPROCEDURE EVALUATION
Department of Anesthesiology  Postprocedure Note    Patient: Pasha Robledo  MRN: [id number]  YOB: 1950  Date of evaluation: 11/19/2020  Time:  1:44 PM     Procedure Summary     Date:  11/19/20 Room / Location:  [address] [place] [address]    Anesthesia Start:  8991 Anesthesia Stop:  0039    Procedure:  BACK LESION [address] (Left ) Diagnosis:  (LARGE LIPOMA LEFT BACK)    Surgeon:  Lew Chew DO Responsible Provider:  MATT Dorsey CRNA    Anesthesia Type:  general, TIVA ASA Status:  3          Anesthesia Type: general, TIVA    Saw Phase I:      Saw Phase II: Saw Score: 10    Last vitals: Reviewed and per EMR flowsheets.        Anesthesia Post Evaluation    Patient location during evaluation: PACU  Patient participation: complete - patient participated  Level of consciousness: awake and alert  Pain score: 0  Airway patency: patent  Nausea & Vomiting: no nausea and no vomiting  Complications: no  Cardiovascular status: blood pressure returned to baseline  Respiratory status: acceptable  Hydration status: euvolemic

## 2020-11-19 NOTE — BRIEF OP NOTE
Brief Postoperative Note      Patient: Lina Jasso  YOB: 1950  MRN: [id number] Alexey Chowdhury, APRN - CNP      Date of Procedure: 11/19/2020    Pre-Op Diagnosis: large lipoma left back    Post-Op Diagnosis: Same       Procedure(s):  Simple sharp excisional biopsy lipoma left back down to fascia, 5 cm    Surgeon(s):  Hillman Hashimoto, DO    Anesthesia: General    Estimated Blood Loss (mL): less than 5 ml    Complications: None    Specimens: lipoma    Electronically signed by Hillman Hashimoto, DO, FACOS, FACS on 11/19/2020 at 6:06 PM

## 2020-11-19 NOTE — ANESTHESIA PRE PROCEDURE
Department of Anesthesiology  Preprocedure Note       Name:  Osiel Hernandez   Age:  72 y.o.  :  1950                                          MRN:  [id number]         Date:  2020      Surgeon: Richelle Venegas):  Tenisha Ponce,     Procedure: Procedure(s):  BACK LESION EXCISION-LARGE LOWER LIPOMA    Medications prior to admission:   Prior to Admission medications    Medication Sig Start Date End Date Taking?  Authorizing Provider   losartan (COZAAR) 100 MG tablet Take 1 tablet by mouth daily 10/5/20  Yes MATT Navarro - CNP   rosuvastatin (CRESTOR) 5 MG tablet Take 1 tablet by mouth nightly 10/5/20 11/25/21 Yes Merritt Martínez APRN - CNP   fluticasone Ann Ran) 50 MCG/ACT nasal spray Use 2 spray(s) in each nostril once daily 20  Yes Stephane Shine MD   amLODIPine (NORVASC) 10 MG tablet Take 1 tablet by mouth once daily 20  Yes Bart Oviedo MD   levothyroxine (SYNTHROID) 50 MCG tablet TAKE 1 TABLET BY MOUTH ONCE DAILY IN THE MORNING ON AN EMPTY STOMACH 20  Yes Bart Ovieod MD   metFORMIN (GLUCOPHAGE) 500 MG tablet Take 1 tablet by mouth twice daily 3/20/20  Yes Bart Oviedo MD   metoprolol succinate (TOPROL XL) 100 MG extended release tablet Take 1 tablet by mouth once daily 3/16/20  Yes Bart Oviedo MD   Chlorpheniramine Maleate (ALLERGY RELIEF PO) Take by mouth as needed   Yes Historical Provider, MD   blood glucose test strips (FREESTYLE LITE) strip 1 each by Does not apply route daily DX--E11.9 NON- INSULIN DEPENDENT 20   MATT Navarro CNP   glucose monitoring kit (FREESTYLE) monitoring kit 1 kit by Does not apply route daily 19   Batr Oviedo MD   aspirin 81 MG tablet Take 162 mg by mouth daily    Historical Provider, MD   vitamin D 1000 UNITS CAPS Take by mouth daily D3    Historical Provider, MD   Blood Glucose Monitoring Suppl (FREESTYLE LITE) KORY  8/5/15   Historical Provider, MD   Lancets MISC 1 each by Does not apply route daily Type II diabetes mellitus 8/5/15   Nathalie Torres MD   Garlic 589 MG TABS Take  by mouth daily. Historical Provider, MD   Coenzyme Q10 (COQ10) 50 MG CAPS Take  by mouth daily. Historical Provider, MD   Multiple Vitamins-Minerals (THERAPEUTIC MULTIVITAMIN-MINERALS) tablet Take 1 tablet by mouth daily. Historical Provider, MD Orozco Simple 500 MG CAPS Take 1 tablet by mouth daily. Historical Provider, MD   Calcium Citrate-Vitamin D (CITRACAL + D PO) Take by mouth daily     Historical Provider, MD   Omega-3 Fatty Acids (OMEGA 3 PO) Take 1 tablet by mouth daily     Historical Provider, MD   FLAXSEED OIL Take 1 tablet by mouth daily     Historical Provider, MD       Current medications:    Current Facility-Administered Medications   Medication Dose Route Frequency Provider Last Rate Last Dose    ceFAZolin (ANCEF) 2 g in dextrose 5 % 100 mL IVPB  2 g Intravenous Once Parul I Nazemi, DO        lactated ringers infusion   Intravenous Continuous Parul I Nazemi, DO        lactated ringers infusion   Intravenous Continuous Parul I Nazemi,  mL/hr at 11/19/20 0950      lidocaine PF 1 % injection 1 mL  1 mL Intradermal Once PRN Parul I Nazemi, DO         Facility-Administered Medications Ordered in Other Encounters   Medication Dose Route Frequency Provider Last Rate Last Dose    sodium chloride flush 0.9 % injection 10 mL  10 mL Intravenous PRN Nasfat Tamanna Michelle MD        sodium chloride (PF) 0.9 % injection 10 mL  10 mL Intravenous PRN Meryle Geralds, MD           Allergies:     Allergies   Allergen Reactions    Seasonal     Tape Bello Fried Tape] Rash       Problem List:    Patient Active Problem List   Diagnosis Code    Polycythemia vera ([facility] D45    Essential hypertension I10    Hyperlipidemia E78.5    Type 2 diabetes mellitus without complication ([facility] O00.3    Hypothyroidism E03.9    Allergic rhinitis due to other allergen J30.89    Other seborrheic keratosis L82.1    Surgical History:        Procedure Laterality Date    CHOLECYSTECTOMY      1976    COLONOSCOPY  10/10/2016    -diverticulosis,hemorrhoids    HYSTERECTOMY      2002    LEG SURGERY      left and right laser surgery    OTHER SURGICAL HISTORY Left 06/06/2018    [facility]    OTHER SURGICAL HISTORY Left 03/20/2019    DR Gomez/[employer] [place]/sclerotherapy with Asclera       Social History:    Social History     Tobacco Use    Smoking status: Never Smoker    Smokeless tobacco: Never Used   Substance Use Topics    Alcohol use: No                                Counseling given: Not Answered      Vital Signs (Current):   Vitals:    11/19/20 0944   Pulse: 67   Resp: 15   Temp: 36.7 °C (98 °F)   TempSrc: Temporal   SpO2: 97%   Weight: 224 lb (101.6 kg)   Height: 5' 10\" (1.778 m)                                              BP Readings from Last 3 Encounters:   11/12/20 (!) 157/91   11/02/20 (!) 143/80   11/02/20 133/81       NPO Status: Time of last liquid consumption: 2200                        Time of last solid consumption: 1830                        Date of last liquid consumption: 11/18/20                        Date of last solid food consumption: 11/18/20    BMI:   Wt Readings from Last 3 Encounters:   11/19/20 224 lb (101.6 kg)   11/12/20 226 lb 9.6 oz (102.8 kg)   11/02/20 224 lb 12.8 oz (102 kg)     Body mass index is 32.14 kg/m².     CBC:   Lab Results   Component Value Date    WBC 5.5 10/01/2020    WBC 5.7 12/02/2015    RBC 5.16 10/01/2020    HGB 15.4 10/01/2020    HCT 46.0 10/01/2020    MCV 89 10/01/2020    RDW 14.5 10/01/2020     10/01/2020     12/02/2015     04/17/2012       CMP:   Lab Results   Component Value Date     11/13/2020    K 4.1 11/13/2020     11/13/2020    CO2 27 11/13/2020    BUN 18 11/13/2020    CREATININE 0.57 11/13/2020    LABGLOM 151 03/30/2015    GLUCOSE 168 11/13/2020    PROT 6.4 11/13/2020    CALCIUM 9.5 11/13/2020    BILITOT 1.3 11/13/2020    BILITOT NEGATIVE 05/06/2019    ALKPHOS 70 11/13/2020    AST 18 11/13/2020    ALT 22 11/13/2020       POC Tests:   Recent Labs     11/19/20  0951   POCGLU 144*       Coags: No results found for: PROTIME, INR, APTT    HCG (If Applicable): No results found for: PREGTESTUR, PREGSERUM, HCG, HCGQUANT     ABGs: No results found for: PHART, PO2ART, NHT5IHC, VAH5JKF, BEART, M4YWSJON     Type & Screen (If Applicable):  No results found for: LABABO, LABRH    Drug/Infectious Status (If Applicable):  Lab Results   Component Value Date    HEPCAB NEGATIVE 08/14/2017       COVID-19 Screening (If Applicable):   Lab Results   Component Value Date    COVID19 Not Detected 11/12/2020         Anesthesia Evaluation   no history of anesthetic complications:   Airway: Mallampati: II  TM distance: >3 FB   Neck ROM: full  Mouth opening: > = 3 FB Dental: normal exam         Pulmonary:normal exam  breath sounds clear to auscultation  (+) sleep apnea: on CPAP,                             Cardiovascular:  Exercise tolerance: good (>4 METS),   (+) hypertension:, valvular problems/murmurs (murmur):, murmur, hyperlipidemia      ECG reviewed                        Neuro/Psych:   (+) psychiatric history:            GI/Hepatic/Renal:   (+) morbid obesity          Endo/Other:    (+) DiabetesType II DM, poorly controlled, , hypothyroidism::., .                 Abdominal:           Vascular: negative vascular ROS. Anesthesia Plan      general and TIVA     ASA 3       Induction: intravenous. Anesthetic plan and risks discussed with patient.                       MATT Ryan CRNA   11/19/2020

## 2020-11-19 NOTE — PROGRESS NOTES
Patient ambulates to bathroom; gait steady with stand-by assist x1; voids without difficulty and returns to sit-up in bed.

## 2020-11-19 NOTE — PROGRESS NOTES
Discharge isntructions given to patient and patient  verbalizes understanding and offers no questions at this time.

## 2020-11-19 NOTE — H&P
GENERAL SURGERY H&P        Patient's Name/ Date of Birth/ Gender: Racquel Golden / 1950 (72 y.o.) / female      PCP: MATT Torres - CNP     History of present Illness:  Patient is a pleasant 72 y.o. female  kindly referred for symptomatic chronic lipoma lower mid/left back, increasing in size, painful, difficult to lay flat or sit comfortably for long periods of time. On daily aspirin.  No previous surgery at the site.     Past Medical History:  has a past medical history of Activity intolerance, Allergic rhinitis due to other allergen, Depressive disorder, not elsewhere classified, Diabetes ([address] Diverticulosis, H/O echocardiogram, Hypertension, LAYO (obstructive sleep apnea), Other and unspecified hyperlipidemia, Other seborrheic keratosis, Pain in lower limb, Peripheral vascular disease ([address] Polycythemia vera(238.4), Polycythemia vera(238.4), Supraventricular tachycardia ([address] Type II or unspecified type diabetes mellitus without mention of complication, not stated as uncontrolled, Type II or unspecified type diabetes mellitus without mention of complication, not stated as uncontrolled, Undiagnosed cardiac murmurs, Unspecified essential hypertension, Unspecified hypothyroidism, 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     Past Surgical History:   Past Surgical History         Past Surgical History:   Procedure Laterality Date    CHOLECYSTECTOMY         1976    COLONOSCOPY   10/10/2016     -diverticulosis,hemorrhoids    HYSTERECTOMY         2002    LEG SURGERY         left and right laser surgery    OTHER SURGICAL HISTORY Left 06/06/2018     sclerotherapy/Dr Nguyen/[facility]    OTHER SURGICAL HISTORY Left 03/20/2019     DR Gomez/[facility]/sclerotherapy with [facility]           Social History:  reports that she has never smoked. She has never used smokeless tobacco. She reports that she does not drink alcohol.     Family History: family history includes Cancer in her father; Diabetes in her mother; Hearing Loss in her father; High Blood Pressure in her father and mother; Stroke in her mother.     Review of Systems:   General: Denies fever, chills, night sweats, weight loss, malaise, fatigue  HEENT: Denies sore throat, sinus problems, allergic rhinosinusitis  Card: Denies chest pain, palpitations, orthopnea/PND. HTN. Pulm: Denies cough, shortness of breath, dyspnea on exertion  GI:  per HPI. : Denies polyuria, dysuria, hematuria  Endo: DM, hypothyroid  Heme: polycythemia vera, gets occasional bloodletting, on aspirin. Hx DVT  Psych: neg  Neuro: Denies h/o CVA, TIA  Skin: Denies rashes, ulcers  Musculoskeletal: no gross motor dysfunction.  OA     Allergies: Seasonal and Tape [adhesive tape]     Current Meds:  Current Medication   Current Outpatient Medications:     losartan (COZAAR) 100 MG tablet, Take 1 tablet by mouth daily, Disp: 90 tablet, Rfl: 1    rosuvastatin (CRESTOR) 5 MG tablet, Take 1 tablet by mouth nightly, Disp: 90 tablet, Rfl: 1    blood glucose test strips (FREESTYLE LITE) strip, 1 each by Does not apply route daily DX--E11.9 NON- INSULIN DEPENDENT, Disp: 100 each, Rfl: 3    fluticasone (FLONASE) 50 MCG/ACT nasal spray, Use 2 spray(s) in each nostril once daily, Disp: 16 g, Rfl: 0    amLODIPine (NORVASC) 10 MG tablet, Take 1 tablet by mouth once daily, Disp: 90 tablet, Rfl: 3    levothyroxine (SYNTHROID) 50 MCG tablet, TAKE 1 TABLET BY MOUTH ONCE DAILY IN THE MORNING ON AN EMPTY STOMACH, Disp: 90 tablet, Rfl: 3    metFORMIN (GLUCOPHAGE) 500 MG tablet, Take 1 tablet by mouth twice daily, Disp: 180 tablet, Rfl: 3    metoprolol succinate (TOPROL XL) 100 MG extended release tablet, Take 1 tablet by mouth once daily, Disp: 90 tablet, Rfl: 2    glucose monitoring kit (FREESTYLE) monitoring kit, 1 kit by Does not apply route daily, Disp: 1 kit, Rfl: 0    aspirin 81 MG tablet, Take 162 mg by mouth daily, Disp: , Rfl:     Chlorpheniramine Maleate (ALLERGY RELIEF PO), Take by mouth as needed, Disp: , Rfl:     vitamin D 1000 UNITS CAPS, Take by mouth daily D3, Disp: , Rfl:     Blood Glucose Monitoring Suppl (FREESTYLE LITE) KORY, , Disp: , Rfl:     Lancets MISC, 1 each by Does not apply route daily Type II diabetes mellitus, Disp: 100 each, Rfl: 3    Garlic 971 MG TABS, Take  by mouth daily. , Disp: , Rfl:     Coenzyme Q10 (COQ10) 50 MG CAPS, Take  by mouth daily. , Disp: , Rfl:     Multiple Vitamins-Minerals (THERAPEUTIC MULTIVITAMIN-MINERALS) tablet, Take 1 tablet by mouth daily. , Disp: , Rfl:     Acai Berry 500 MG CAPS, Take 1 tablet by mouth daily. , Disp: , Rfl:     Calcium Citrate-Vitamin D (CITRACAL + D PO), Take by mouth daily , Disp: , Rfl:     Omega-3 Fatty Acids (OMEGA 3 PO), Take 1 tablet by mouth daily , Disp: , Rfl:     FLAXSEED OIL, Take 1 tablet by mouth daily , Disp: , Rfl:         Physical Exam:  Vital signs and Nurse's note reviewed  Gen:  A&Ox3, NAD. Pleasant and cooperative. HEENT: PERRLA, EOMI, no scleral icterus  Neck:  no goiter  CVS: Regular rate   Resp: Good bilateral air entry, no active wheezing, no labored breathing  Abd: soft, non-tender, non-distended.  Left lower mid back large lipoma 8x6 cm  Ext: Moves all extremities, no gross focal motor deficits  Skin: No erythema or ulcerations      Labs:         Lab Results   Component Value Date     WBC 5.5 10/01/2020     WBC 5.7 12/02/2015     HGB 15.4 10/01/2020     HCT 46.0 10/01/2020     MCV 89 10/01/2020      10/01/2020      12/02/2015      04/17/2012            Lab Results   Component Value Date      11/13/2020     K 4.1 11/13/2020      11/13/2020     CO2 27 11/13/2020     BUN 18 11/13/2020     CREATININE 0.57 11/13/2020     GLUCOSE 168 11/13/2020     CALCIUM 9.5 11/13/2020            Lab Results   Component Value Date     ALKPHOS 70 11/13/2020     ALT 22 11/13/2020     AST 18 11/13/2020     PROT 6.4 11/13/2020     BILITOT 1.3 11/13/2020     BILITOT NEGATIVE 05/06/2019     LABALBU 4.3 11/13/2020         Impressions/Recommendations:         Large left sided back lower lipoma 8 x 6 cm. Hold asa 7 days prior.     Right side down on cart. General. Already had EKG. BMP ordered and normal. Scanned into media 11/17/20.     H&P  General Surgery        Pt Name: Leo Richardson  MRN: [id number]  YOB: 1950  Date of evaluation: 11/19/2020      [x] I have examined the patient and reviewed the H&P/Consult completed, and there are no changes to the patient or plans. [] I have examined the patient and reviewed the H&P/Consult and have noted the following changes: The patient was counseled at length about the risks of yuan Covid-19 during their perioperative period and any recovery window from their procedure. The patient was made aware that yuan Covid-19  may worsen their prognosis for recovering from their procedure  and lend to a higher morbidity and/or mortality risk. All material risks, benefits, and reasonable alternatives including postponing the procedure were discussed. The patient does wish to proceed with the procedure at this time.         Electronically signed by Elisabeth Ryan DO  on 11/19/2020 at 10:51 AM

## 2020-11-19 NOTE — PROGRESS NOTES

## 2020-11-23 LAB — SURGICAL PATHOLOGY REPORT: NORMAL

## 2020-11-25 ENCOUNTER — OFFICE VISIT (OUTPATIENT)
Dept: SURGERY | Age: 70
End: 2020-11-25
Payer: MEDICARE

## 2020-11-25 VITALS
DIASTOLIC BLOOD PRESSURE: 97 MMHG | HEART RATE: 71 BPM | RESPIRATION RATE: 20 BRPM | SYSTOLIC BLOOD PRESSURE: 170 MMHG | BODY MASS INDEX: 32.07 KG/M2 | HEIGHT: 70 IN | WEIGHT: 224 LBS | TEMPERATURE: 98.8 F

## 2020-11-25 PROCEDURE — 99024 POSTOP FOLLOW-UP VISIT: CPT | Performed by: SURGERY

## 2020-11-25 NOTE — PROGRESS NOTES
11/25/20  Postop      Reda Peppers is healing well. Sutures removed. steristrips placed. Findings discussed. Wound care discussed. Small seroma palpated that will reabsorb. No drains needed. She has healed well and may return or call as needed. .  Surgical Pathology Report  11/19/2020  9:21 AM  [address]    -- Diagnosis --   SOFT TISSUE, LOWER BACK, EXCISION:     - Loretto Brittle. Meme Faria M.D.   **Electronically Signed Out**         jet/11/23/2020         Clinical Information   Pre-op Diagnosis:  LARGE LIPOMA   Operative Findings:  LIPOMA LEFT LOWER BACK   Operation Performed:  EXCISION     Source of Specimen   1: LIPOMA LEFT LOWER BACK       Gross Description   \"SUZI HIVELY, LIPOMA LEFT LOWER BACK\" 7.8 x 7.0 x 5.0 cm portion   of lobular fatty tissue with no hemorrhage or necrosis.    Representative sections 2cs.  tm

## 2021-02-02 ENCOUNTER — HOSPITAL ENCOUNTER (OUTPATIENT)
Dept: WOMENS IMAGING | Age: 71
Discharge: HOME | End: 2021-02-04
Payer: MEDICARE

## 2021-02-02 DIAGNOSIS — Z12.31 ENCOUNTER FOR SCREENING MAMMOGRAM FOR BREAST CANCER: ICD-10-CM

## 2021-02-02 LAB
ABSOLUTE BASO #: 0.1 X10E9/L (ref 0–0.2)
ABSOLUTE EOS #: 0.2 X10E9/L (ref 0–0.4)
ABSOLUTE LYMPH #: 1.5 X10E9/L (ref 1–3.5)
ABSOLUTE MONO #: 0.8 X10E9/L (ref 0–0.9)
ABSOLUTE NEUT #: 4.7 X10E9/L (ref 1.5–6.6)
BASOPHILS RELATIVE PERCENT: 1 %
EOSINOPHILS RELATIVE PERCENT: 2.4 %
HCT VFR BLD CALC: 46.4 % (ref 35–47)
HEMOGLOBIN: 15.2 G/DL (ref 11.7–15.5)
LYMPHOCYTE %: 20.7 %
MCH RBC QN AUTO: 29.1 PG (ref 27–34)
MCHC RBC AUTO-ENTMCNC: 32.8 G/DL (ref 32–36)
MCV RBC AUTO: 89 FL (ref 80–100)
MONOCYTES # BLD: 11 %
NEUTROPHILS RELATIVE PERCENT: 64.9 %
PDW BLD-RTO: 15.4 % (ref 11.5–15)
PLATELETS: 177 X10E9/L (ref 150–450)
PMV BLD AUTO: 9.8 FL (ref 7–12)
RBC: 5.23 X10E12/L (ref 3.8–5.2)
WBC: 7.2 X10E9/L (ref 4–11)

## 2021-02-02 PROCEDURE — 77063 BREAST TOMOSYNTHESIS BI: CPT

## 2021-02-04 ENCOUNTER — HOSPITAL ENCOUNTER (OUTPATIENT)
Dept: INFUSION THERAPY | Age: 71
Discharge: HOME | End: 2021-02-04
Payer: MEDICARE

## 2021-02-04 VITALS
SYSTOLIC BLOOD PRESSURE: 136 MMHG | HEART RATE: 72 BPM | DIASTOLIC BLOOD PRESSURE: 78 MMHG | RESPIRATION RATE: 16 BRPM | TEMPERATURE: 98.9 F

## 2021-02-04 DIAGNOSIS — D45 POLYCYTHEMIA VERA (HCC): Primary | ICD-10-CM

## 2021-02-04 PROCEDURE — 99195 PHLEBOTOMY: CPT

## 2021-02-04 RX ORDER — 0.9 % SODIUM CHLORIDE 0.9 %
250 INTRAVENOUS SOLUTION INTRAVENOUS ONCE
Status: CANCELLED | OUTPATIENT
Start: 2021-04-22 | End: 2021-04-22

## 2021-02-04 RX ORDER — 0.9 % SODIUM CHLORIDE 0.9 %
500 INTRAVENOUS SOLUTION INTRAVENOUS ONCE
Status: CANCELLED | OUTPATIENT
Start: 2021-04-22 | End: 2021-04-22

## 2021-02-04 NOTE — PROGRESS NOTES
1310--Therapeutic phlebotomy performed to left AC vein x1 attempt with 500 ml blood withdrawn over 10 minutes. 1320--phlebotomy complete. Pressure dressing to site. See flowsheet for VS. Pt drinking water. Encouraged her to increase fluid intake today. 1350--No c/o. Pt discharged ambulatory in stable condition.

## 2021-05-03 ENCOUNTER — OFFICE VISIT (OUTPATIENT)
Dept: PULMONOLOGY | Age: 71
End: 2021-05-03
Payer: MEDICARE

## 2021-05-03 VITALS
RESPIRATION RATE: 16 BRPM | TEMPERATURE: 98 F | OXYGEN SATURATION: 95 % | BODY MASS INDEX: 33.13 KG/M2 | DIASTOLIC BLOOD PRESSURE: 76 MMHG | WEIGHT: 231.4 LBS | HEART RATE: 76 BPM | SYSTOLIC BLOOD PRESSURE: 146 MMHG | HEIGHT: 70 IN

## 2021-05-03 DIAGNOSIS — I82.412 DVT FEMORAL (DEEP VENOUS THROMBOSIS) WITH THROMBOPHLEBITIS, LEFT (HCC): ICD-10-CM

## 2021-05-03 DIAGNOSIS — I27.20 PULMONARY HYPERTENSION (HCC): ICD-10-CM

## 2021-05-03 DIAGNOSIS — I50.32 CHRONIC DIASTOLIC (CONGESTIVE) HEART FAILURE (HCC): ICD-10-CM

## 2021-05-03 DIAGNOSIS — E66.09 OBESITY DUE TO EXCESS CALORIES, UNSPECIFIED OBESITY SEVERITY: ICD-10-CM

## 2021-05-03 DIAGNOSIS — D45 POLYCYTHEMIA VERA (HCC): ICD-10-CM

## 2021-05-03 DIAGNOSIS — R09.82 POST-NASAL DRIP: ICD-10-CM

## 2021-05-03 DIAGNOSIS — G47.33 OSA (OBSTRUCTIVE SLEEP APNEA): Primary | ICD-10-CM

## 2021-05-03 PROCEDURE — 3017F COLORECTAL CA SCREEN DOC REV: CPT | Performed by: INTERNAL MEDICINE

## 2021-05-03 PROCEDURE — 1036F TOBACCO NON-USER: CPT | Performed by: INTERNAL MEDICINE

## 2021-05-03 PROCEDURE — 99214 OFFICE O/P EST MOD 30 MIN: CPT | Performed by: INTERNAL MEDICINE

## 2021-05-03 PROCEDURE — 1090F PRES/ABSN URINE INCON ASSESS: CPT | Performed by: INTERNAL MEDICINE

## 2021-05-03 PROCEDURE — G8399 PT W/DXA RESULTS DOCUMENT: HCPCS | Performed by: INTERNAL MEDICINE

## 2021-05-03 PROCEDURE — 4040F PNEUMOC VAC/ADMIN/RCVD: CPT | Performed by: INTERNAL MEDICINE

## 2021-05-03 PROCEDURE — G8417 CALC BMI ABV UP PARAM F/U: HCPCS | Performed by: INTERNAL MEDICINE

## 2021-05-03 PROCEDURE — G8427 DOCREV CUR MEDS BY ELIG CLIN: HCPCS | Performed by: INTERNAL MEDICINE

## 2021-05-03 PROCEDURE — 1123F ACP DISCUSS/DSCN MKR DOCD: CPT | Performed by: INTERNAL MEDICINE

## 2021-05-10 NOTE — PROGRESS NOTES
ONCE DAILY IN THE MORNING ON AN EMPTY STOMACH 90 tablet 3    aspirin 81 MG tablet Take 162 mg by mouth daily      Chlorpheniramine Maleate (ALLERGY RELIEF PO) Take by mouth as needed      vitamin D 1000 UNITS CAPS Take by mouth daily D3      Garlic 664 MG TABS Take  by mouth daily.  Coenzyme Q10 (COQ10) 50 MG CAPS Take  by mouth daily.  Multiple Vitamins-Minerals (THERAPEUTIC MULTIVITAMIN-MINERALS) tablet Take 1 tablet by mouth daily.  Acai Solorio 500 MG CAPS Take 1 tablet by mouth daily.  Calcium Citrate-Vitamin D (CITRACAL + D PO) Take by mouth daily       Omega-3 Fatty Acids (OMEGA 3 PO) Take 1 tablet by mouth daily       FLAXSEED OIL Take 1 tablet by mouth daily       metFORMIN (GLUCOPHAGE) 500 MG tablet Take 2 tablet by mouth twice daily 360 tablet 1    glimepiride (AMARYL) 1 MG tablet Take 1 tablet by mouth every morning 30 tablet 3    blood glucose test strips (FREESTYLE LITE) strip 1 each by Does not apply route daily DX--E11.9 NON- INSULIN DEPENDENT 100 each 3    glucose monitoring kit (FREESTYLE) monitoring kit 1 kit by Does not apply route daily 1 kit 0    Blood Glucose Monitoring Suppl (FREESTYLE LITE) KORY       Lancets MISC 1 each by Does not apply route daily Type II diabetes mellitus 100 each 3     No current facility-administered medications for this visit. Facility-Administered Medications Ordered in Other Visits   Medication Dose Route Frequency Provider Last Rate Last Admin    sodium chloride flush 0.9 % injection 10 mL  10 mL Intravenous PRN Serena Ackerman MD        sodium chloride (PF) 0.9 % injection 10 mL  10 mL Intravenous PRN Behzad Meeks MD           FAMILY HISTORY: family history includes Cancer in her father; Diabetes in her mother; Hearing Loss in her father; High Blood Pressure in her father and mother; Stroke in her mother. SOCIAL AND OCCUPATIONAL HEALTH:  The patient is a Never smoker. There  is not history of TB or TB exposure. There is not asbestos or silica dust exposure. The patient reports does not have coal, foundry, quarry or [employer] exposure. Travel history reveals no significant history of risk factors for pulm disease. There is not  history of recreational or IV drug use. The patient does not pets, dogs, cats turtles or exotic birds. Review of Systems:  Review of Systems -   General ROS: Completed and except as mentioned above were negative   Psychological ROS:  Completed and except as mentioned above were negative  Ophthalmic ROS:  Completed and except as mentioned above were negative  ENT ROS:  Completed and except as mentioned above were negative  Allergy and Immunology ROS:  Completed and except as mentioned above were negative  Hematological and Lymphatic ROS:  Completed and except as mentioned above were negative  Endocrine ROS: Completed and except as mentioned above were negative  Breast ROS:  Completed and except as mentioned above were negative  Respiratory ROS:  Completed and except as mentioned above were negative  Cardiovascular ROS:  Completed and except as mentioned above were negative  Gastrointestinal ROS: Completed and except as mentioned above were negative  Genito-Urinary ROS:  Completed and except as mentioned above were negative  Musculoskeletal ROS:  Completed and except as mentioned above were negative  Neurological ROS:  Completed and except as mentioned above were negative  Dermatological ROS:  Completed and except as mentioned above were negative    SLEEP  No epistaxis or sore throat. Patient has been using her CPAP machine with improvement in the daytime sleepiness and fatigue and tiredness  No MVA. No edema.       PHYSICAL EXAMINATION:  Vitals:    05/03/21 1044 05/03/21 1049   BP: (!) 149/80 (!) 146/76   Pulse: 75 76   Resp: 16    Temp: 98 °F (36.7 °C)    SpO2: 95%    Weight: 231 lb 6.4 oz (105 kg)    Height: 5' 10\" (1.778 m)      PHYSICAL EXAMINATION:  Vitals:    05/03/21 1044 05/03/21 if needed. Patient will call us if she is sick, so she can be seen sooner. Thank you for having us involved in the care of your patient. Please call us if you have any questions or concerns.               Shen Tanner MD  5/9/2021 10:08 PM

## 2021-05-19 RX ORDER — 0.9 % SODIUM CHLORIDE 0.9 %
500 INTRAVENOUS SOLUTION INTRAVENOUS ONCE
Status: CANCELLED | OUTPATIENT
Start: 2021-05-19 | End: 2021-05-19

## 2021-05-19 RX ORDER — 0.9 % SODIUM CHLORIDE 0.9 %
250 INTRAVENOUS SOLUTION INTRAVENOUS ONCE
Status: CANCELLED | OUTPATIENT
Start: 2021-05-19 | End: 2021-05-19

## 2021-05-24 LAB
ABSOLUTE BASO #: 0 X10E9/L (ref 0–0.2)
ABSOLUTE EOS #: 0.1 X10E9/L (ref 0–0.4)
ABSOLUTE LYMPH #: 1.4 X10E9/L (ref 1–3.5)
ABSOLUTE MONO #: 0.4 X10E9/L (ref 0–0.9)
ABSOLUTE NEUT #: 2.8 X10E9/L (ref 1.5–6.6)
BASOPHILS RELATIVE PERCENT: 0.8 %
EOSINOPHILS RELATIVE PERCENT: 2.6 %
HCT VFR BLD CALC: 44.7 % (ref 35–47)
HEMOGLOBIN: 14.8 G/DL (ref 11.7–15.5)
LYMPHOCYTE %: 29.4 %
MCH RBC QN AUTO: 29 PG (ref 27–34)
MCHC RBC AUTO-ENTMCNC: 33.2 G/DL (ref 32–36)
MCV RBC AUTO: 87 FL (ref 80–100)
MONOCYTES # BLD: 9.1 %
NEUTROPHILS RELATIVE PERCENT: 58.1 %
PDW BLD-RTO: 14.9 % (ref 11.5–15)
PLATELETS: 172 X10E9/L (ref 150–450)
PMV BLD AUTO: 9.7 FL (ref 7–12)
RBC: 5.12 X10E12/L (ref 3.8–5.2)
WBC: 4.8 X10E9/L (ref 4–11)

## 2021-05-27 ENCOUNTER — HOSPITAL ENCOUNTER (OUTPATIENT)
Dept: INFUSION THERAPY | Age: 71
Discharge: HOME | End: 2021-05-27
Payer: MEDICARE

## 2021-05-27 VITALS
TEMPERATURE: 99.1 F | SYSTOLIC BLOOD PRESSURE: 158 MMHG | RESPIRATION RATE: 18 BRPM | DIASTOLIC BLOOD PRESSURE: 82 MMHG | HEART RATE: 83 BPM

## 2021-05-27 DIAGNOSIS — D45 POLYCYTHEMIA VERA (HCC): Primary | ICD-10-CM

## 2021-05-27 PROCEDURE — 99195 PHLEBOTOMY: CPT

## 2021-05-27 RX ORDER — 0.9 % SODIUM CHLORIDE 0.9 %
250 INTRAVENOUS SOLUTION INTRAVENOUS ONCE
Status: CANCELLED | OUTPATIENT
Start: 2021-05-27 | End: 2021-05-27

## 2021-05-27 RX ORDER — 0.9 % SODIUM CHLORIDE 0.9 %
500 INTRAVENOUS SOLUTION INTRAVENOUS ONCE
Status: CANCELLED | OUTPATIENT
Start: 2021-05-27 | End: 2021-05-27

## 2021-05-27 NOTE — PROGRESS NOTES
[id number] Patient arrives for phlebotomy. States having some headaches and dizziness. 1308 16 gauge needle inserted into right antecubital per drawing kit. Brisk blood return noted. Patient tolerated well. 1315 500ml blood obtained. Patient tolerated well without complaints. Drinking water. 1318 /78 pulse 85  1324 /75 pulse 85 Drinking water  1330 /74 pulse 82  1332 Patient left ambulating without complains. Encouraged to push fluids.   States understanding

## 2021-07-14 ENCOUNTER — OFFICE VISIT (OUTPATIENT)
Dept: VASCULAR SURGERY | Age: 71
End: 2021-07-14
Payer: MEDICARE

## 2021-07-14 VITALS
HEART RATE: 74 BPM | BODY MASS INDEX: 32.75 KG/M2 | HEIGHT: 70 IN | WEIGHT: 228.8 LBS | SYSTOLIC BLOOD PRESSURE: 151 MMHG | TEMPERATURE: 97.8 F | DIASTOLIC BLOOD PRESSURE: 83 MMHG | RESPIRATION RATE: 18 BRPM

## 2021-07-14 DIAGNOSIS — I83.811 VARICOSE VEINS OF LEG WITH PAIN, RIGHT: Primary | ICD-10-CM

## 2021-07-14 DIAGNOSIS — I87.2 VENOUS INSUFFICIENCY OF BOTH LOWER EXTREMITIES: ICD-10-CM

## 2021-07-14 PROCEDURE — 4040F PNEUMOC VAC/ADMIN/RCVD: CPT | Performed by: INTERNAL MEDICINE

## 2021-07-14 PROCEDURE — 3017F COLORECTAL CA SCREEN DOC REV: CPT | Performed by: INTERNAL MEDICINE

## 2021-07-14 PROCEDURE — G8417 CALC BMI ABV UP PARAM F/U: HCPCS | Performed by: INTERNAL MEDICINE

## 2021-07-14 PROCEDURE — G8427 DOCREV CUR MEDS BY ELIG CLIN: HCPCS | Performed by: INTERNAL MEDICINE

## 2021-07-14 PROCEDURE — 99214 OFFICE O/P EST MOD 30 MIN: CPT | Performed by: INTERNAL MEDICINE

## 2021-07-14 PROCEDURE — G8399 PT W/DXA RESULTS DOCUMENT: HCPCS | Performed by: INTERNAL MEDICINE

## 2021-07-14 PROCEDURE — 1123F ACP DISCUSS/DSCN MKR DOCD: CPT | Performed by: INTERNAL MEDICINE

## 2021-07-14 PROCEDURE — 1036F TOBACCO NON-USER: CPT | Performed by: INTERNAL MEDICINE

## 2021-07-14 PROCEDURE — 1090F PRES/ABSN URINE INCON ASSESS: CPT | Performed by: INTERNAL MEDICINE

## 2021-07-14 NOTE — PROGRESS NOTES
Sarah Hsieh was seen on 7/14/2021 for   Chief Complaint   Patient presents with    Follow-up     Pain in right leg behind knee and calf   . 5/9/18 First recent vascular visit. PCP Dr. Ronald Mayen. R leg GSV laser Britany Taylor, 6/2/14, followed by L 4/30/15 laser c stab phlebectomy. Had pain, swelling, no skin breakdown, hyperpigmentation. Initial concern was for dvt, but studies were neg. Mom had varicose veins. Not treated. On feet at dept store all day. Legs felt btr after laser, still wore stockings, has developed sx last year. Both legs had swelling and pain. Thigh veins prominent. UPDATE 8/22/18 oN 6/6/18 had LGSV foam, then 6/13/18 RGSV. 6/27/18 DUS showed LPTV DVT, with bilateral GSV occlusive thrombus. Still has L thigh varicosity patent. Legs feel better. Still wearing support stockings. On her feet a lot. UPDATE 2/19/19 Right leg is much better. Has developed a small thigh varicosity which is mildly tender. Left leg had residual branch which was not sclerosed on 1st procedure, with possible plan to address later, probably with foam. Still has some thigh pain. UPDATE 4/3/19 Since last visit had 3/20/19 left lateral thigh polidocanol foam therapy using butterfly needle. Mild soreness post procedure with hyperpigmentation. Duplex 3/27/19 showed that veins in lateral left thigh and in knee area are clotted with no DVT. UPDATE 10/9/19 Legs have been doing well. Not painful. Notices left gaiter hyperpigmentation. Spider veins right medial foot. Lateral foot has varicose veins. Wearing compression stockings daily. UPDATE 7/8/20 Almost to 50th wedding anniversary. Had 2 months of left posterior knee pain, popliteal fossa area. Has gone away. No changes on foot or toes. Painful veins have resolved. Still has spider veins. UPDATE 7/14/21 Now  47 yrs (7/11) Right leg aches at night. Moving around makes her leg bother her more. Interferes with sleep.  Had foam in 2018 after US showed multiple levels of superficial reflux. Wears compression         Social History     Socioeconomic History    Marital status:      Spouse name: Not on file    Number of children: Not on file    Years of education: Not on file    Highest education level: Not on file   Occupational History    Not on file   Tobacco Use    Smoking status: Never Smoker    Smokeless tobacco: Never Used   Vaping Use    Vaping Use: Never used   Substance and Sexual Activity    Alcohol use: No    Drug use: Never    Sexual activity: Not on file   Other Topics Concern    Not on file   Social History Narrative    Not on file     Social Determinants of Health     Financial Resource Strain:     Difficulty of Paying Living Expenses:    Food Insecurity:     Worried About Running Out of Food in the Last Year:     [address] in the Last Year:    Transportation Needs:     Lack of Transportation (Medical):      Lack of Transportation (Non-Medical):    Physical Activity:     Days of Exercise per Week:     Minutes of Exercise per Session:    Stress:     Feeling of Stress :    Social Connections:     Frequency of Communication with Friends and Family:     Frequency of Social Gatherings with Friends and Family:     Attends Faith Services:     Active Member of Clubs or Organizations:     Attends Club or Organization Meetings:     Marital Status:    Intimate Partner Violence:     Fear of Current or Ex-Partner:     Emotionally Abused:     Physically Abused:     Sexually Abused:      Family History   Problem Relation Age of Onset    Diabetes Mother     High Blood Pressure Mother     Stroke Mother     Cancer Father     Hearing Loss Father     High Blood Pressure Father      Past Medical History:   Diagnosis Date    Activity intolerance 06/06/2018    Allergic rhinitis due to other allergen     Depressive disorder, not elsewhere classified     Diabetes [facility] [address]     Diverticulosis     H/O echocardiogram 09/07/2016    EF 55%. LV wall thickness is mildly increased. Left atrium is severely dilated (>40) left atrial volume index of 41 ml/m2. Mild mitral and treicuspid regurg. Moderate pulmonary hypertension estimated right ventricular systolic pressure of 42 mmHg. Moderate diastolic dysfunction is seen.  Hypertension     LAYO (obstructive sleep apnea) 12/14/2015    Other and unspecified hyperlipidemia     Other seborrheic keratosis     Pain in lower limb 06/06/2018    Peripheral vascular disease (HCC)     Right leg. Had ablation done 6/2014.    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 56/22/0920    Varicose veins of left lower extremity with pain 06/06/2018    Venous (peripheral) insufficiency 06/06/2018    Venous insufficiency of both lower extremities 06/06/2018     Current Outpatient Medications   Medication Sig Dispense Refill    losartan (COZAAR) 100 MG tablet Take 1 tablet by mouth once daily 30 tablet 0    amLODIPine (NORVASC) 10 MG tablet Take 1 tablet by mouth once daily 90 tablet 1    glimepiride (AMARYL) 1 MG tablet Take 1 tablet by mouth every morning 90 tablet 1    rosuvastatin (CRESTOR) 5 MG tablet Take 1 tablet by mouth nightly 90 tablet 0    metoprolol succinate (TOPROL XL) 100 MG extended release tablet Take 1 tablet by mouth once daily 90 tablet 0    metFORMIN (GLUCOPHAGE) 500 MG tablet Take 1 tablet by mouth 2 times daily (with meals) 360 tablet 1    fluticasone (FLONASE) 50 MCG/ACT nasal spray Use 2 spray(s) in each nostril once daily 16 g 0  blood glucose test strips (FREESTYLE LITE) strip 1 each by Does not apply route daily DX--E11.9 NON- INSULIN DEPENDENT 100 each 3    levothyroxine (SYNTHROID) 50 MCG tablet TAKE 1 TABLET BY MOUTH ONCE DAILY IN THE MORNING ON AN EMPTY STOMACH 90 tablet 3    glucose monitoring kit (FREESTYLE) monitoring kit 1 kit by Does not apply route daily 1 kit 0    aspirin 81 MG tablet Take 162 mg by mouth daily      Chlorpheniramine Maleate (ALLERGY RELIEF PO) Take by mouth as needed      vitamin D 1000 UNITS CAPS Take by mouth daily D3      Blood Glucose Monitoring Suppl (FREESTYLE LITE) KORY       Lancets MISC 1 each by Does not apply route daily Type II diabetes mellitus 051 each 3    Garlic 283 MG TABS Take  by mouth daily.  Coenzyme Q10 (COQ10) 50 MG CAPS Take  by mouth daily.  Multiple Vitamins-Minerals (THERAPEUTIC MULTIVITAMIN-MINERALS) tablet Take 1 tablet by mouth daily.  Acai Solorio 500 MG CAPS Take 1 tablet by mouth daily.  Calcium Citrate-Vitamin D (CITRACAL + D PO) Take by mouth daily       Omega-3 Fatty Acids (OMEGA 3 PO) Take 1 tablet by mouth daily       FLAXSEED OIL Take 1 tablet by mouth daily        No current facility-administered medications for this visit.      Facility-Administered Medications Ordered in Other Visits   Medication Dose Route Frequency Provider Last Rate Last Admin    sodium chloride flush 0.9 % injection 10 mL  10 mL Intravenous PRN Serena Lemus MD        sodium chloride (PF) 0.9 % injection 10 mL  10 mL Intravenous PRN Anum Martino MD             Physical findings:  General- no acute distress, oriented  HEENT - no xanthelasma, external ears normal  Neck- Supple, no thyromegaly  Skin- warm, dry, no skin breakdown or gangrene  Extremities - 2+ edema right ankle, corona phlebectatica right foot, hyperpigmentation right gaiter region    Pulses Right - Posterior tibial:    absent, 2+  Dorsalis pedis:  2+       Assessment:  Encounter Diagnoses   Name Primary?  Varicose veins of leg with pain, right Yes    Venous insufficiency of both lower extremities      Right leg pain interfering with sleep  Will check duplex and rtc 3 wks to discuss  Continue compression  May need sclero  30 min chart review and encounter  Plan of care: Follow up and evaluate.        Electronically signed by Darrin Blandon MD on 7/14/21 at 9:06 AM EDT

## 2021-07-14 NOTE — PATIENT INSTRUCTIONS
SURVEY:    You may be receiving a survey from [employer] regarding your visit today. Please complete the survey to enable us to provide the highest quality of care to you and your family. If you cannot score us a very good on any question, please call the office to discuss how we could have made your experience a very good one. Thank you.

## 2021-07-14 NOTE — PROGRESS NOTES
Nigel Jimenes was seen on 7/14/2021 for   Chief Complaint   Patient presents with    Follow-up     Pain in right leg behind knee and calf   .                             REVIEW OF SYSTEMS    Constitutional Weight: absent, A, Fatigue: absent Fever: absent   HEENT Ears: normal,Visual disturbance: absent Sore throat: absent,    Respiratory Shortness of breath: absent, Cough: absent;, Snoring: absent   Cardivascular Chest pain: absent,  Leg pain: present,Leg swelling:present, Non-healing wound:absent    GI Diarrhea: absent, Abdominal Pain: absent    Urinary frequency: present, Urinary incontinence: absent   Musculoskeletal Neck pain: absent, Back pain: absent, Restless Leg:absent     Dermatological Hair loss: absent, Skin changes: absent   Neurological  Sudden Loss of Vision in one eye:absent, Slurred Speech:absent, Weakness on one side of body: absent,Headache: absent   Psychiatric Anxiety: absent, Depression: absent   Hematologic Abnormal bleeding: absent,

## 2021-07-28 ENCOUNTER — HOSPITAL ENCOUNTER (OUTPATIENT)
Dept: VASCULAR LAB | Age: 71
Discharge: HOME | End: 2021-07-30
Payer: MEDICARE

## 2021-07-28 DIAGNOSIS — I83.811 VARICOSE VEINS OF LEG WITH PAIN, RIGHT: ICD-10-CM

## 2021-07-28 PROCEDURE — 93971 EXTREMITY STUDY: CPT

## 2021-08-04 ENCOUNTER — OFFICE VISIT (OUTPATIENT)
Dept: VASCULAR SURGERY | Age: 71
End: 2021-08-04
Payer: MEDICARE

## 2021-08-04 VITALS
DIASTOLIC BLOOD PRESSURE: 88 MMHG | BODY MASS INDEX: 32.13 KG/M2 | WEIGHT: 224.4 LBS | SYSTOLIC BLOOD PRESSURE: 134 MMHG | TEMPERATURE: 99 F | RESPIRATION RATE: 18 BRPM | HEART RATE: 69 BPM | HEIGHT: 70 IN

## 2021-08-04 DIAGNOSIS — I83.811 VARICOSE VEINS OF LEG WITH PAIN, RIGHT: Primary | ICD-10-CM

## 2021-08-04 PROCEDURE — 1090F PRES/ABSN URINE INCON ASSESS: CPT | Performed by: INTERNAL MEDICINE

## 2021-08-04 PROCEDURE — 99213 OFFICE O/P EST LOW 20 MIN: CPT | Performed by: INTERNAL MEDICINE

## 2021-08-04 PROCEDURE — 1123F ACP DISCUSS/DSCN MKR DOCD: CPT | Performed by: INTERNAL MEDICINE

## 2021-08-04 PROCEDURE — G8427 DOCREV CUR MEDS BY ELIG CLIN: HCPCS | Performed by: INTERNAL MEDICINE

## 2021-08-04 PROCEDURE — G8417 CALC BMI ABV UP PARAM F/U: HCPCS | Performed by: INTERNAL MEDICINE

## 2021-08-04 PROCEDURE — 3017F COLORECTAL CA SCREEN DOC REV: CPT | Performed by: INTERNAL MEDICINE

## 2021-08-04 PROCEDURE — G8399 PT W/DXA RESULTS DOCUMENT: HCPCS | Performed by: INTERNAL MEDICINE

## 2021-08-04 PROCEDURE — 4040F PNEUMOC VAC/ADMIN/RCVD: CPT | Performed by: INTERNAL MEDICINE

## 2021-08-04 PROCEDURE — 99214 OFFICE O/P EST MOD 30 MIN: CPT | Performed by: INTERNAL MEDICINE

## 2021-08-04 PROCEDURE — 1036F TOBACCO NON-USER: CPT | Performed by: INTERNAL MEDICINE

## 2021-08-04 NOTE — PROGRESS NOTES
Pamela Shipman was seen on 8/4/2021 for   Chief Complaint   Patient presents with    Follow-up     VV   .5/9/18 First recent vascular visit. PCP Dr. Siobhan Villegas. R leg GSV laser [address] [facility], 6/2/14, followed by L 4/30/15 laser c stab phlebectomy. Had pain, swelling, no skin breakdown, hyperpigmentation. Initial concern was for dvt, but studies were neg. Mom had varicose veins. Not treated. On feet at dept store all day. Legs felt btr after laser, still wore stockings, has developed sx last year. Both legs had swelling and pain. Thigh veins prominent. UPDATE 8/22/18 oN 6/6/18 had LGSV foam, then 6/13/18 RGSV. 6/27/18 DUS showed LPTV DVT, with bilateral GSV occlusive thrombus. Still has L thigh varicosity patent. Legs feel better. Still wearing support stockings. On her feet a lot. UPDATE 2/19/19 Right leg is much better. Has developed a small thigh varicosity which is mildly tender. Left leg had residual branch which was not sclerosed on 1st procedure, with possible plan to address later, probably with foam. Still has some thigh pain. UPDATE 4/3/19 Since last visit had 3/20/19 left lateral thigh polidocanol foam therapy using butterfly needle. Mild soreness post procedure with hyperpigmentation. Duplex 3/27/19 showed that veins in lateral left thigh and in knee area are clotted with no DVT. UPDATE 10/9/19 Legs have been doing well. Not painful. Notices left gaiter hyperpigmentation. Spider veins right medial foot. Lateral foot has varicose veins. Wearing compression stockings daily. UPDATE 7/8/20 Almost to 51th wedding anniversary. Had 2 months of left posterior knee pain, popliteal fossa area. Has gone away. No changes on foot or toes. Painful veins have resolved. Still has spider veins. UPDATE 7/14/21 Now  47 yrs (7/11) Right leg aches at night. Moving around makes her leg bother her more. Interferes with sleep. Had foam in 2018 after US showed multiple levels of superficial reflux. Wears compression  UPDATE 8/4/21 7/28/21 DUPLEX shows RGSV is still patent. There are multiple distended brs thigh, calf, ankle. Also a 5.2 mm perf with 4883 msec. SFJ 3544    Thigh 4961  5.9  Knee 3189  4.1  Calf  2767  7.6  Ankle 1078    SSV distended brs  2144 msec 4 mm         Social History     Socioeconomic History    Marital status:      Spouse name: Not on file    Number of children: Not on file    Years of education: Not on file    Highest education level: Not on file   Occupational History    Not on file   Tobacco Use    Smoking status: Never Smoker    Smokeless tobacco: Never Used   Vaping Use    Vaping Use: Never used   Substance and Sexual Activity    Alcohol use: No    Drug use: Never    Sexual activity: Not on file   Other Topics Concern    Not on file   Social History Narrative    Not on file     Social Determinants of Health     Financial Resource Strain:     Difficulty of Paying Living Expenses:    Food Insecurity:     Worried About Running Out of Food in the Last Year:     [address] in the Last Year:    Transportation Needs:     Lack of Transportation (Medical):      Lack of Transportation (Non-Medical):    Physical Activity:     Days of Exercise per Week:     Minutes of Exercise per Session:    Stress:     Feeling of Stress :    Social Connections:     Frequency of Communication with Friends and Family:     Frequency of Social Gatherings with Friends and Family:     Attends Orthodox Services:     Active Member of Clubs or Organizations:     Attends Club or Organization Meetings:     Marital Status:    Intimate Partner Violence:     Fear of Current or Ex-Partner:     Emotionally Abused:     Physically Abused:     Sexually Abused:      Family History   Problem Relation Age of Onset    Diabetes Mother     High Blood Pressure Mother     Stroke Mother     Cancer Father     Hearing Loss Father     High Blood Pressure Father      Past Medical History: Diagnosis Date    Activity intolerance 06/06/2018    Allergic rhinitis due to other allergen     Depressive disorder, not elsewhere classified     Diabetes ([facility] [address]     Diverticulosis     H/O echocardiogram 09/07/2016    EF 55%. LV wall thickness is mildly increased. Left atrium is severely dilated (>40) left atrial volume index of 41 ml/m2. Mild mitral and treicuspid regurg. Moderate pulmonary hypertension estimated right ventricular systolic pressure of 42 mmHg. Moderate diastolic dysfunction is seen.  Hypertension     LAYO (obstructive sleep apnea) 12/14/2015    Other and unspecified hyperlipidemia     Other seborrheic keratosis     Pain in lower limb 06/06/2018    Peripheral vascular disease (HCC)     Right leg. Had ablation done 6/2014.    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 25/03/7097    Varicose veins of left lower extremity with pain 06/06/2018    Venous (peripheral) insufficiency 06/06/2018    Venous insufficiency of both lower extremities 06/06/2018     Current Outpatient Medications   Medication Sig Dispense Refill    metFORMIN (GLUCOPHAGE) 500 MG tablet Take 250 mg by mouth 2 times daily (with meals)      losartan (COZAAR) 100 MG tablet Take 1 tablet by mouth once daily 90 tablet 0    amLODIPine (NORVASC) 10 MG tablet Take 1 tablet by mouth once daily 90 tablet 1    glimepiride (AMARYL) 1 MG tablet Take 1 tablet by mouth every morning 90 tablet 1    rosuvastatin (CRESTOR) 5 MG tablet Take 1 tablet by mouth nightly 90 tablet 0    metoprolol succinate (TOPROL XL) 100 MG extended release tablet Take 1 tablet by mouth once daily 90 tablet 0    fluticasone (FLONASE) 50 MCG/ACT nasal spray Use 2 spray(s) in each nostril once daily (Patient taking differently: Use 2 spray(s) in each nostril once daily prn) 16 g 0    levothyroxine (SYNTHROID) 50 MCG tablet TAKE 1 TABLET BY MOUTH ONCE DAILY IN THE MORNING ON AN EMPTY STOMACH 90 tablet 3    aspirin 81 MG tablet Take 162 mg by mouth daily      Chlorpheniramine Maleate (ALLERGY RELIEF PO) Take by mouth as needed      vitamin D 1000 UNITS CAPS Take by mouth daily D3      Garlic 783 MG TABS Take  by mouth daily.  Coenzyme Q10 (COQ10) 50 MG CAPS Take  by mouth daily.  Multiple Vitamins-Minerals (THERAPEUTIC MULTIVITAMIN-MINERALS) tablet Take 1 tablet by mouth daily.  Acai Solorio 500 MG CAPS Take 1 tablet by mouth daily.  Calcium Citrate-Vitamin D (CITRACAL + D PO) Take by mouth daily       Omega-3 Fatty Acids (OMEGA 3 PO) Take 1 tablet by mouth daily       FLAXSEED OIL Take 1 tablet by mouth daily       Lancets MISC TEST BLOOD SUGAR ONCE DAILY AS DIRECTED. FREESTYLE LANCETS DX:E11.9 100 each 1    blood glucose test strips (FREESTYLE LITE) strip 1 each by Does not apply route daily DX--E11.9 NON- INSULIN DEPENDENT 100 each 3    glucose monitoring kit (FREESTYLE) monitoring kit 1 kit by Does not apply route daily 1 kit 0    Blood Glucose Monitoring Suppl (FREESTYLE LITE) KORY       Lancets MISC 1 each by Does not apply route daily Type II diabetes mellitus 100 each 3     No current facility-administered medications for this visit.      Facility-Administered Medications Ordered in Other Visits   Medication Dose Route Frequency Provider Last Rate Last Admin    sodium chloride flush 0.9 % injection 10 mL  10 mL Intravenous PRN Serena Bran MD        sodium chloride (PF) 0.9 % injection 10 mL  10 mL Intravenous PRN Oli Newton MD             Physical findings:  General- no acute distress, oriented  HEENT - no xanthelasma, external ears normal  Neck- Supple, no thyromegaly  Skin- warm, dry, no skin breakdown or gangrene  Extremities - no edema, prominent vv medial thigh, post calf on right        Assessment:  Encounter Diagnosis   Name Primary?  Varicose veins of leg with pain, right Yes   painful right leg vv with gsv and ssv involvement  Failure to respond to compression  Although sx are most prominent with ssv, gsv findings are significant and she reports anterior leg ache as well below knee  [place] prob precludes vs    Plan of care: Will check insurance for vs  Plan on rgsv and rssv rf 8/31/21 10am  35 min chart review and encounter  Follow up and evaluate.        Electronically signed by Janie Aguirre MD on 8/4/21 at 10:39 AM EDT

## 2021-08-04 NOTE — PROGRESS NOTES
Osiel David was seen on 8/4/2021 for   Chief Complaint   Patient presents with    Follow-up     VV   .                             REVIEW OF SYSTEMS    Constitutional Weight: absent, A, Fatigue: absent Fever: absent   HEENT Ears: normal,Visual disturbance: absent Sore throat: absent,    Respiratory Shortness of breath: absent, Cough: absent;, Snoring: absent   Cardivascular Chest pain: absent,  Leg pain: present,Leg swelling:present, Non-healing wound:absent    GI Diarrhea: absent, Abdominal Pain: absent    Urinary frequency: absent, Urinary incontinence: absent   Musculoskeletal Neck pain: absent, Back pain: absent, Restless Leg:absent     Dermatological Hair loss: absent, Skin changes: absent   Neurological  Sudden Loss of Vision in one eye:absent, Slurred Speech:absent, Weakness on one side of body: absent,Headache: absent   Psychiatric Anxiety: absent, Depression: absent   Hematologic Abnormal bleeding: absent,

## 2021-08-04 NOTE — PATIENT INSTRUCTIONS
SURVEY:    You may be receiving a survey from [employer] regarding your visit today. Please complete the survey to enable us to provide the highest quality of care to you and your family. If you cannot score us a very good on any question, please call the office to discuss how we could have made your experience a very good one. Thank you.

## 2021-08-31 ENCOUNTER — HOSPITAL ENCOUNTER (OUTPATIENT)
Dept: INTERVENTIONAL RADIOLOGY/VASCULAR | Age: 71
Discharge: HOME | End: 2021-08-31
Attending: INTERNAL MEDICINE | Admitting: INTERNAL MEDICINE
Payer: MEDICARE

## 2021-08-31 ENCOUNTER — HOSPITAL ENCOUNTER (OUTPATIENT)
Dept: VASCULAR LAB | Age: 71
Discharge: HOME | End: 2021-09-02
Payer: MEDICARE

## 2021-08-31 VITALS
RESPIRATION RATE: 16 BRPM | TEMPERATURE: 99.5 F | SYSTOLIC BLOOD PRESSURE: 179 MMHG | HEART RATE: 76 BPM | DIASTOLIC BLOOD PRESSURE: 71 MMHG | OXYGEN SATURATION: 96 %

## 2021-08-31 DIAGNOSIS — I83.819 VARICOSE VEINS WITH PAIN: ICD-10-CM

## 2021-08-31 DIAGNOSIS — I83.811 VARICOSE VEINS OF LEG WITH PAIN, RIGHT: ICD-10-CM

## 2021-08-31 PROCEDURE — 2709999900 HC NON-CHARGEABLE SUPPLY

## 2021-08-31 PROCEDURE — C1769 GUIDE WIRE: HCPCS

## 2021-08-31 PROCEDURE — 2500000003 HC RX 250 WO HCPCS: Performed by: INTERNAL MEDICINE

## 2021-08-31 PROCEDURE — 36482 ENDOVEN THER CHEM ADHES 1ST: CPT | Performed by: INTERNAL MEDICINE

## 2021-08-31 PROCEDURE — 36483 ENDOVEN THER CHEM ADHES SBSQ: CPT | Performed by: INTERNAL MEDICINE

## 2021-08-31 PROCEDURE — C1894 INTRO/SHEATH, NON-LASER: HCPCS

## 2021-08-31 PROCEDURE — 2720000010 HC SURG SUPPLY STERILE

## 2021-08-31 RX ORDER — LIDOCAINE HYDROCHLORIDE 10 MG/ML
INJECTION, SOLUTION EPIDURAL; INFILTRATION; INTRACAUDAL; PERINEURAL
Status: COMPLETED | OUTPATIENT
Start: 2021-08-31 | End: 2021-08-31

## 2021-08-31 RX ADMIN — LIDOCAINE HYDROCHLORIDE 7 ML: 10 INJECTION, SOLUTION EPIDURAL; INFILTRATION; INTRACAUDAL; PERINEURAL at 10:17

## 2021-08-31 RX ADMIN — LIDOCAINE HYDROCHLORIDE 2 ML: 10 INJECTION, SOLUTION EPIDURAL; INFILTRATION; INTRACAUDAL; PERINEURAL at 10:33

## 2021-08-31 ASSESSMENT — PAIN - FUNCTIONAL ASSESSMENT: PAIN_FUNCTIONAL_ASSESSMENT: 0-10

## 2021-08-31 NOTE — SEDATION DOCUMENTATION
Discharge instructions reviewed with patient, voices understanding. Ambulates across room to restroom, gait steady.

## 2021-08-31 NOTE — PROCEDURES
[address] [place], [place] [address]                            CARDIAC CATHETERIZATION    PATIENT NAME: Jose J Bravo                  :        1950  MED REC NO:   [id number]                              ROOM:  ACCOUNT NO:   [de-identified]                           ADMIT DATE: 2021  PROVIDER:     Wilfrid Banda    DATE OF PROCEDURE:  2021    FINAL IMPRESSIONS:  1. Successful VenaSeal closure of the right great saphenous vein. 2.  Successful VenaSeal closure of the right small saphenous vein. PROCEDURE:  Access under ultrasound, VenaSeal closure of the right great  and small saphenous veins. ESTIMATED BLOOD LOSS:  10 mL. PROCEDURE:  Written informed consent was obtained. The right great  saphenous vein was identified at a location near the ankle. A  micropuncture introducer was placed followed by placement of an 0.035  wire. The wire was advanced into the mid thigh region and the delivery  catheter was then advanced, 50 cm of delivery catheter was positioned  within the vessel. The VenaSeal glue delivery catheter was then placed  and the vein compressed VenaSeal was then administered according to the  IFU throughout the great saphenous vein from mid thigh to just above the  ankle. The device was withdrawn and adequate hemostasis achieved. The  patient was then placed in prone position and the right small saphenous  vein was identified under ultrasound. Micropuncture technique was used  to introduce a micropuncture introducer. A [employer] wire was then  advanced to the saphenopopliteal junction. This was followed by the  glue delivery catheter which was positioned with its tip 5 cm from the  saphenopopliteal junction. The proximal vessel was compressed and glue  was delivered according to the IFU. The catheter was withdrawn. Adequate hemostasis was achieved. There were no complications.     The patient was placed in a compression stocking and instructed to  ambulate 30 minutes daily starting today. COMMENT:  The patient is a 58-year-old female who has had both laser  therapy and foam sclerotherapy on the right great saphenous vein. She  continued to have multiple painful branches with reflux demonstrated in  the great saphenous vein and small saphenous vein. She will be  maintained with compression and exercise and will have ultrasound  followup.         Tre Fleming    D: 08/31/2021 11:56:29       T: 08/31/2021 12:01:16     [id number]  Job#: [id number]     Doc#: [id number]    CC:

## 2021-09-01 NOTE — H&P
UPDATE 8/4/21 7/28/21 DUPLEX shows RGSV is still patent. There are multiple distended brs thigh, calf, ankle. Also a 5.2 mm perf with 4883 msec. SFJ 3544    Thigh 4961  5.9  Knee 3189  4.1  Calf  2767  7.6  Ankle 1078     SSV distended brs  2144 msec 4 mm           Social History               Socioeconomic History    Marital status:        Spouse name: Not on file    Number of children: Not on file    Years of education: Not on file    Highest education level: Not on file   Occupational History    Not on file   Tobacco Use    Smoking status: Never Smoker    Smokeless tobacco: Never Used   Vaping Use    Vaping Use: Never used   Substance and Sexual Activity    Alcohol use: No    Drug use: Never    Sexual activity: Not on file   Other Topics Concern    Not on file   Social History Narrative    Not on file      Social Determinants of Health          Financial Resource Strain:     Difficulty of Paying Living Expenses:    Food Insecurity:     Worried About Running Out of Food in the Last Year:     Ran Out of Food in the Last Year:    Transportation Needs:     Lack of Transportation (Medical):      Lack of Transportation (Non-Medical):    Physical Activity:     Days of Exercise per Week:     Minutes of Exercise per Session:    Stress:     Feeling of Stress :    Social Connections:     Frequency of Communication with Friends and Family:     Frequency of Social Gatherings with Friends and Family:     Attends Mandaeism Services:     Active Member of Clubs or Organizations:     Attends Club or Organization Meetings:     Marital Status:    Intimate Partner Violence:     Fear of Current or Ex-Partner:     Emotionally Abused:     Physically Abused:     Sexually Abused:          Family History         Family History   Problem Relation Age of Onset    Diabetes Mother      High Blood Pressure Mother      Stroke Mother      Cancer Father      Hearing Loss Father      High Blood Pressure Father           Past Medical History        Past Medical History:   Diagnosis Date    Activity intolerance 06/06/2018    Allergic rhinitis due to other allergen      Depressive disorder, not elsewhere classified      Diabetes ([facility] [address]      Diverticulosis      H/O echocardiogram 09/07/2016     EF 55%. LV wall thickness is mildly increased. Left atrium is severely dilated (>40) left atrial volume index of 41 ml/m2. Mild mitral and treicuspid regurg. Moderate pulmonary hypertension estimated right ventricular systolic pressure of 42 mmHg. Moderate diastolic dysfunction is seen.  Hypertension      LAYO (obstructive sleep apnea) 12/14/2015    Other and unspecified hyperlipidemia      Other seborrheic keratosis      Pain in lower limb 06/06/2018    Peripheral vascular disease (HCC)       Right leg. Had ablation done 6/2014.     Polycythemia vera(238.4)      Polycythemia vera(238.4)      Supraventricular tachycardia (HCC)      Type II or unspecified type diabetes mellitus without mention of complication, not stated as uncontrolled      Type II or unspecified type diabetes mellitus without mention of complication, not stated as uncontrolled      Undiagnosed cardiac murmurs      Unspecified essential hypertension      Unspecified hypothyroidism      Varicose veins of left lower extremity with complications 02/93/3075    Varicose veins of left lower extremity with pain 06/06/2018    Venous (peripheral) insufficiency 06/06/2018    Venous insufficiency of both lower extremities 06/06/2018         Current Facility-Administered Medications          Current Outpatient Medications   Medication Sig Dispense Refill    metFORMIN (GLUCOPHAGE) 500 MG tablet Take 250 mg by mouth 2 times daily (with meals)        losartan (COZAAR) 100 MG tablet Take 1 tablet by mouth once daily 90 tablet 0    amLODIPine (NORVASC) 10 MG tablet Take 1 tablet by mouth once daily 90 tablet 1    glimepiride (AMARYL) 1 MG tablet Take 1 tablet by mouth every morning 90 tablet 1    rosuvastatin (CRESTOR) 5 MG tablet Take 1 tablet by mouth nightly 90 tablet 0    metoprolol succinate (TOPROL XL) 100 MG extended release tablet Take 1 tablet by mouth once daily 90 tablet 0    fluticasone (FLONASE) 50 MCG/ACT nasal spray Use 2 spray(s) in each nostril once daily (Patient taking differently: Use 2 spray(s) in each nostril once daily prn) 16 g 0    levothyroxine (SYNTHROID) 50 MCG tablet TAKE 1 TABLET BY MOUTH ONCE DAILY IN THE MORNING ON AN EMPTY STOMACH 90 tablet 3    aspirin 81 MG tablet Take 162 mg by mouth daily        Chlorpheniramine Maleate (ALLERGY RELIEF PO) Take by mouth as needed        vitamin D 1000 UNITS CAPS Take by mouth daily D3        Garlic 474 MG TABS Take  by mouth daily.        Coenzyme Q10 (COQ10) 50 MG CAPS Take  by mouth daily.        Multiple Vitamins-Minerals (THERAPEUTIC MULTIVITAMIN-MINERALS) tablet Take 1 tablet by mouth daily.        Acai Solorio 500 MG CAPS Take 1 tablet by mouth daily.        Calcium Citrate-Vitamin D (CITRACAL + D PO) Take by mouth daily         Omega-3 Fatty Acids (OMEGA 3 PO) Take 1 tablet by mouth daily         FLAXSEED OIL Take 1 tablet by mouth daily         Lancets MISC TEST BLOOD SUGAR ONCE DAILY AS DIRECTED.  FREESTYLE LANCETS DX:E11.9 100 each 1    blood glucose test strips (FREESTYLE LITE) strip 1 each by Does not apply route daily DX--E11.9 NON- INSULIN DEPENDENT 100 each 3    glucose monitoring kit (FREESTYLE) monitoring kit 1 kit by Does not apply route daily 1 kit 0    Blood Glucose Monitoring Suppl (FREESTYLE LITE) KORY          Lancets MISC 1 each by Does not apply route daily Type II diabetes mellitus 100 each 3      No current facility-administered medications for this visit.                Facility-Administered Medications Ordered in Other Visits   Medication Dose Route Frequency Provider Last Rate Last Admin    sodium chloride flush 0.9 % injection 10 mL  10 mL Intravenous PRN Nasfat Pita Munoz MD        sodium chloride (PF) 0.9 % injection 10 mL  10 mL Intravenous PRN Francisca Storm MD                   Physical findings:  General- no acute distress, oriented  HEENT - no xanthelasma, external ears normal  Neck- Supple, no thyromegaly  Skin- warm, dry, no skin breakdown or gangrene  Extremities - no edema, prominent vv medial thigh, post calf on right           Assessment:       Encounter Diagnosis   Name Primary?     Varicose veins of leg with pain, right Yes   painful right leg vv with gsv and ssv involvement  Failure to respond to compression  Although sx are most prominent with ssv, gsv findings are significant and she reports anterior leg ache as well below knee  [place] prob precludes vs     Plan of care: GSV, SSV venaseal    Tiffanie Mckeon MD

## 2021-09-03 ENCOUNTER — HOSPITAL ENCOUNTER (OUTPATIENT)
Dept: VASCULAR LAB | Age: 71
Discharge: HOME | End: 2021-09-05
Payer: MEDICARE

## 2021-09-03 DIAGNOSIS — I83.811 VARICOSE VEINS OF LEG WITH PAIN, RIGHT: ICD-10-CM

## 2021-09-03 PROCEDURE — 93971 EXTREMITY STUDY: CPT

## 2021-09-08 ENCOUNTER — OFFICE VISIT (OUTPATIENT)
Dept: VASCULAR SURGERY | Age: 71
End: 2021-09-08
Payer: MEDICARE

## 2021-09-08 VITALS
TEMPERATURE: 99.2 F | DIASTOLIC BLOOD PRESSURE: 76 MMHG | WEIGHT: 224.4 LBS | SYSTOLIC BLOOD PRESSURE: 157 MMHG | RESPIRATION RATE: 16 BRPM | BODY MASS INDEX: 32.13 KG/M2 | HEIGHT: 70 IN | HEART RATE: 77 BPM

## 2021-09-08 DIAGNOSIS — I83.811 VARICOSE VEINS OF LEG WITH PAIN, RIGHT: Primary | ICD-10-CM

## 2021-09-08 PROCEDURE — 99214 OFFICE O/P EST MOD 30 MIN: CPT | Performed by: INTERNAL MEDICINE

## 2021-09-08 PROCEDURE — 1090F PRES/ABSN URINE INCON ASSESS: CPT | Performed by: INTERNAL MEDICINE

## 2021-09-08 PROCEDURE — G8427 DOCREV CUR MEDS BY ELIG CLIN: HCPCS | Performed by: INTERNAL MEDICINE

## 2021-09-08 PROCEDURE — 4040F PNEUMOC VAC/ADMIN/RCVD: CPT | Performed by: INTERNAL MEDICINE

## 2021-09-08 PROCEDURE — 3017F COLORECTAL CA SCREEN DOC REV: CPT | Performed by: INTERNAL MEDICINE

## 2021-09-08 PROCEDURE — 1123F ACP DISCUSS/DSCN MKR DOCD: CPT | Performed by: INTERNAL MEDICINE

## 2021-09-08 PROCEDURE — 1036F TOBACCO NON-USER: CPT | Performed by: INTERNAL MEDICINE

## 2021-09-08 PROCEDURE — G8417 CALC BMI ABV UP PARAM F/U: HCPCS | Performed by: INTERNAL MEDICINE

## 2021-09-08 PROCEDURE — G8399 PT W/DXA RESULTS DOCUMENT: HCPCS | Performed by: INTERNAL MEDICINE

## 2021-09-08 NOTE — PROGRESS NOTES
Humera Jarvis was seen on 9/8/2021 for   Chief Complaint   Patient presents with    Post-Op Check     procedure done on right leg . Arely Cooper post op  scan done. .. reports pain behind the knee   .                             REVIEW OF SYSTEMS    Constitutional Weight: absent, A, Fatigue: present Fever: absent   HEENT Ears: normal,Visual disturbance: absent Sore throat: absent,    Respiratory Shortness of breath: absent, Cough: absent;, Snoring: absent   Cardivascular Chest pain: absent,  Leg pain: present,Leg swelling:absent, Non-healing wound:absent    GI Diarrhea: absent, Abdominal Pain: absent    Urinary frequency: present, Urinary incontinence: absent   Musculoskeletal Neck pain: absent, Back pain: absent, Restless Leg:absent     Dermatological Hair loss: absent, Skin changes: absent   Neurological  Sudden Loss of Vision in one eye:absent, Slurred Speech:absent, Weakness on one side of body: absent,Headache: absent   Psychiatric Anxiety: present, Depression: absent   Hematologic Abnormal bleeding: absent,

## 2021-09-08 NOTE — PATIENT INSTRUCTIONS
SURVEY:    You may be receiving a survey from [employer] regarding your visit today. Please complete the survey to enable us to provide the highest quality of care to you and your family. If you cannot score us a very good on any question, please call the office to discuss how we could have made your experience a very good one. Thank you. Please recheck your blood pressure when you go home and make sure you take your prescribed medication if applicable . Please follow up with your PCP or ER if needed.

## 2021-09-08 NOTE — PROGRESS NOTES
Pasha Robledo was seen on 9/8/2021 for   Chief Complaint   Patient presents with    Post-Op Check     procedure done on right leg . Jean-Paul Vizcarra post op  scan done. .. reports pain behind the knee   . 5/9/18 First recent vascular visit. PCP Dr. Berg Husbands. R leg GSV laser Renzo Taylor, 6/2/14, followed by L 4/30/15 laser c stab phlebectomy. Had pain, swelling, no skin breakdown, hyperpigmentation. Initial concern was for dvt, but studies were neg. Mom had varicose veins. Not treated. On feet at dept store all day. Legs felt btr after laser, still wore stockings, has developed sx last year. Both legs had swelling and pain. Thigh veins prominent. UPDATE 8/22/18 oN 6/6/18 had LGSV foam, then 6/13/18 RGSV. 6/27/18 DUS showed LPTV DVT, with bilateral GSV occlusive thrombus. Still has L thigh varicosity patent. Legs feel better. Still wearing support stockings. On her feet a lot. UPDATE 2/19/19 Right leg is much better. Has developed a small thigh varicosity which is mildly tender. Left leg had residual branch which was not sclerosed on 1st procedure, with possible plan to address later, probably with foam. Still has some thigh pain. UPDATE 4/3/19 Since last visit had 3/20/19 left lateral thigh polidocanol foam therapy using butterfly needle. Mild soreness post procedure with hyperpigmentation. Duplex 3/27/19 showed that veins in lateral left thigh and in knee area are clotted with no DVT. UPDATE 10/9/19 Legs have been doing well. Not painful. Notices left gaiter hyperpigmentation. Spider veins right medial foot. Lateral foot has varicose veins. Wearing compression stockings daily. UPDATE 7/8/20 Almost to 51th wedding anniversary. Had 2 months of left posterior knee pain, popliteal fossa area. Has gone away. No changes on foot or toes. Painful veins have resolved. Still has spider veins. UPDATE 7/14/21 Now  47 yrs (7/11) Right leg aches at night. Moving around makes her leg bother her more.  Interferes with sleep. Had foam in 2018 after US showed multiple levels of superficial reflux. Wears compression  UPDATE 8/4/21 7/28/21 DUPLEX shows RGSV is still patent. There are multiple distended brs thigh, calf, ankle. Also a 5.2 mm perf with 4883 msec. SFJ 3544    Thigh 4961  5.9  Knee 3189  4.1  Calf  2767  7.6  Ankle 1078     SSV distended brs  2144 msec 4 mm  UPDATE 9/8/21 On 8/31/21 had venaseal rgsv from mid thigh distally, also ssv. F/u duplex 9/3/21 showed good closure and no DVT. Has been wearing compression. Has pain on right lateral knee.        Social History     Socioeconomic History    Marital status:      Spouse name: Not on file    Number of children: Not on file    Years of education: Not on file    Highest education level: Not on file   Occupational History    Not on file   Tobacco Use    Smoking status: Never Smoker    Smokeless tobacco: Never Used   Vaping Use    Vaping Use: Never used   Substance and Sexual Activity    Alcohol use: No    Drug use: Never    Sexual activity: Not on file   Other Topics Concern    Not on file   Social History Narrative    Not on file     Social Determinants of Health     Financial Resource Strain: Low Risk     Difficulty of Paying Living Expenses: Not hard at all   Food Insecurity: No Food Insecurity    Worried About Running Out of Food in the Last Year: Never true    [address] in the Last Year: Never true   Transportation Needs: No Transportation Needs    Lack of Transportation (Medical): No    Lack of Transportation (Non-Medical):  No   Physical Activity: Insufficiently Active    Days of Exercise per Week: 2 days    Minutes of Exercise per Session: 30 min   Stress:     Feeling of Stress :    Social Connections:     Frequency of Communication with Friends and Family:     Frequency of Social Gatherings with Friends and Family:     Attends [employer] Services:     Active Member of Clubs or Organizations:     Attends Club or Organization Meetings:     Marital Status:    Intimate Partner Violence:     Fear of Current or Ex-Partner:     Emotionally Abused:     Physically Abused:     Sexually Abused:      Family History   Problem Relation Age of Onset    Diabetes Mother     High Blood Pressure Mother     Stroke Mother     Cancer Father     Hearing Loss Father     High Blood Pressure Father      Past Medical History:   Diagnosis Date    Activity intolerance 06/06/2018    Allergic rhinitis due to other allergen     Depressive disorder, not elsewhere classified     Diabetes ([address]     Diverticulosis     H/O echocardiogram 09/07/2016    EF 55%. LV wall thickness is mildly increased. Left atrium is severely dilated (>40) left atrial volume index of 41 ml/m2. Mild mitral and treicuspid regurg. Moderate pulmonary hypertension estimated right ventricular systolic pressure of 42 mmHg. Moderate diastolic dysfunction is seen.  Hypertension     LAYO (obstructive sleep apnea) 12/14/2015    Other and unspecified hyperlipidemia     Other seborrheic keratosis     Pain in lower limb 06/06/2018    Peripheral vascular disease (HCC)     Right leg. Had ablation done 6/2014.    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 23/13/5234    Varicose veins of left lower extremity with pain 06/06/2018    Venous (peripheral) insufficiency 06/06/2018    Venous insufficiency of both lower extremities 06/06/2018     Current Outpatient Medications   Medication Sig Dispense Refill    metoprolol succinate (TOPROL XL) 100 MG extended release tablet Take 1 tablet by mouth once daily 90 tablet 0    rosuvastatin (CRESTOR) 5 MG tablet Take 1 tablet by mouth nightly 90 tablet 0    levothyroxine (SYNTHROID) 50 MCG tablet Take 1 tablet by mouth every morning 90 tablet 1    fluticasone (FLONASE) 50 MCG/ACT nasal spray Use 2 spray(s) in each nostril once daily 16 g 0    metFORMIN (GLUCOPHAGE) 500 MG tablet Take 1 tablet by mouth 2 times daily (with meals) 180 tablet 1    losartan (COZAAR) 100 MG tablet Take 1 tablet by mouth once daily 90 tablet 0    amLODIPine (NORVASC) 10 MG tablet Take 1 tablet by mouth once daily 90 tablet 1    glimepiride (AMARYL) 1 MG tablet Take 1 tablet by mouth every morning 90 tablet 1    aspirin 81 MG tablet Take 162 mg by mouth daily      Chlorpheniramine Maleate (ALLERGY RELIEF PO) Take by mouth as needed      vitamin D 1000 UNITS CAPS Take by mouth daily D3      Garlic 519 MG TABS Take  by mouth daily.  Coenzyme Q10 (COQ10) 50 MG CAPS Take  by mouth daily.  Multiple Vitamins-Minerals (THERAPEUTIC MULTIVITAMIN-MINERALS) tablet Take 1 tablet by mouth daily.  Acai Solorio 500 MG CAPS Take 1 tablet by mouth daily.  Calcium Citrate-Vitamin D (CITRACAL + D PO) Take by mouth daily       Omega-3 Fatty Acids (OMEGA 3 PO) Take 1 tablet by mouth daily       FLAXSEED OIL Take 1 tablet by mouth daily       Lancets MISC TEST BLOOD SUGAR ONCE DAILY AS DIRECTED. FREESTYLE LANCETS DX:E11.9 100 each 1    blood glucose test strips (FREESTYLE LITE) strip 1 each by Does not apply route daily DX--E11.9 NON- INSULIN DEPENDENT 100 each 3    glucose monitoring kit (FREESTYLE) monitoring kit 1 kit by Does not apply route daily 1 kit 0    Blood Glucose Monitoring Suppl (FREESTYLE LITE) KORY       Lancets MISC 1 each by Does not apply route daily Type II diabetes mellitus 100 each 3     No current facility-administered medications for this visit.      Facility-Administered Medications Ordered in Other Visits   Medication Dose Route Frequency Provider Last Rate Last Admin    sodium chloride flush 0.9 % injection 10 mL  10 mL IntraVENous PRN Serena Graham MD        sodium chloride (PF) 0.9 % injection 10 mL  10 mL IntraVENous PRN Nathan Grigsby MD             Physical findings:  General- no acute distress, oriented  HEENT - no xanthelasma, external ears normal  Neck- Supple, no thyromegaly  Abdomen - Non tender, non distended, no bruits  Skin- warm, dry, no skin breakdown or gangrene. Mild to moderate vv right leg, mostly at foot. Mild vv right lateral knee. Not tender  Extremities - 1+ edema      Assessment:  Encounter Diagnosis   Name Primary?  Varicose veins of leg with pain, right Yes   appropriate response to venaseal. Right lateral knee pain seems unrelated to veins    Plan of care:  30 min chart review and encounter  rtc 3 mo  Follow up and evaluate.        Electronically signed by Perez Duncan MD on 9/8/21 at 10:23 AM EDT

## 2021-09-30 LAB
ABSOLUTE BASO #: 0.1 X10E9/L (ref 0–0.2)
ABSOLUTE EOS #: 0.3 X10E9/L (ref 0–0.4)
ABSOLUTE LYMPH #: 1.7 X10E9/L (ref 1–3.5)
ABSOLUTE MONO #: 0.8 X10E9/L (ref 0–0.9)
ABSOLUTE NEUT #: 4 X10E9/L (ref 1.5–6.6)
BASOPHILS RELATIVE PERCENT: 1.3 %
EOSINOPHILS RELATIVE PERCENT: 4.4 %
HCT VFR BLD CALC: 47.4 % (ref 35–47)
HEMOGLOBIN: 15.4 G/DL (ref 11.7–15.5)
LYMPHOCYTE %: 24.5 %
MCH RBC QN AUTO: 28.3 PG (ref 27–34)
MCHC RBC AUTO-ENTMCNC: 32.5 G/DL (ref 32–36)
MCV RBC AUTO: 87 FL (ref 80–100)
MONOCYTES # BLD: 11.2 %
NEUTROPHILS RELATIVE PERCENT: 58.6 %
PDW BLD-RTO: 16.1 % (ref 11.5–15)
PLATELETS: 199 X10E9/L (ref 150–450)
PMV BLD AUTO: 9.3 FL (ref 7–12)
RBC: 5.45 X10E12/L (ref 3.8–5.2)
WBC: 6.8 X10E9/L (ref 4–11)

## 2021-10-04 ENCOUNTER — HOSPITAL ENCOUNTER (OUTPATIENT)
Dept: INFUSION THERAPY | Age: 71
Discharge: HOME | End: 2021-10-04
Payer: MEDICARE

## 2021-10-04 VITALS
RESPIRATION RATE: 18 BRPM | WEIGHT: 231 LBS | SYSTOLIC BLOOD PRESSURE: 158 MMHG | DIASTOLIC BLOOD PRESSURE: 85 MMHG | BODY MASS INDEX: 33.15 KG/M2 | HEART RATE: 62 BPM | TEMPERATURE: 98.6 F

## 2021-10-04 DIAGNOSIS — D45 POLYCYTHEMIA VERA (HCC): Primary | ICD-10-CM

## 2021-10-04 PROCEDURE — 99195 PHLEBOTOMY: CPT

## 2021-10-04 RX ORDER — 0.9 % SODIUM CHLORIDE 0.9 %
500 INTRAVENOUS SOLUTION INTRAVENOUS ONCE
Status: CANCELLED | OUTPATIENT
Start: 2021-10-04 | End: 2021-10-04

## 2021-10-04 RX ORDER — 0.9 % SODIUM CHLORIDE 0.9 %
250 INTRAVENOUS SOLUTION INTRAVENOUS ONCE
Status: CANCELLED | OUTPATIENT
Start: 2021-10-04 | End: 2021-10-04

## 2021-10-13 ENCOUNTER — OFFICE VISIT (OUTPATIENT)
Dept: ONCOLOGY | Age: 71
End: 2021-10-13
Payer: MEDICARE

## 2021-10-13 VITALS
BODY MASS INDEX: 32.93 KG/M2 | RESPIRATION RATE: 18 BRPM | HEIGHT: 70 IN | WEIGHT: 230 LBS | SYSTOLIC BLOOD PRESSURE: 149 MMHG | DIASTOLIC BLOOD PRESSURE: 89 MMHG | HEART RATE: 83 BPM | TEMPERATURE: 98.2 F

## 2021-10-13 DIAGNOSIS — E78.5 HYPERLIPIDEMIA, UNSPECIFIED HYPERLIPIDEMIA TYPE: ICD-10-CM

## 2021-10-13 DIAGNOSIS — D75.1 POLYCYTHEMIA: Primary | ICD-10-CM

## 2021-10-13 DIAGNOSIS — D45 POLYCYTHEMIA VERA (HCC): Primary | ICD-10-CM

## 2021-10-13 PROCEDURE — G8417 CALC BMI ABV UP PARAM F/U: HCPCS | Performed by: INTERNAL MEDICINE

## 2021-10-13 PROCEDURE — G8399 PT W/DXA RESULTS DOCUMENT: HCPCS | Performed by: INTERNAL MEDICINE

## 2021-10-13 PROCEDURE — G8484 FLU IMMUNIZE NO ADMIN: HCPCS | Performed by: INTERNAL MEDICINE

## 2021-10-13 PROCEDURE — 99211 OFF/OP EST MAY X REQ PHY/QHP: CPT | Performed by: INTERNAL MEDICINE

## 2021-10-13 PROCEDURE — 1090F PRES/ABSN URINE INCON ASSESS: CPT | Performed by: INTERNAL MEDICINE

## 2021-10-13 PROCEDURE — 1036F TOBACCO NON-USER: CPT | Performed by: INTERNAL MEDICINE

## 2021-10-13 PROCEDURE — 3017F COLORECTAL CA SCREEN DOC REV: CPT | Performed by: INTERNAL MEDICINE

## 2021-10-13 PROCEDURE — G8427 DOCREV CUR MEDS BY ELIG CLIN: HCPCS | Performed by: INTERNAL MEDICINE

## 2021-10-13 PROCEDURE — 4040F PNEUMOC VAC/ADMIN/RCVD: CPT | Performed by: INTERNAL MEDICINE

## 2021-10-13 PROCEDURE — 99214 OFFICE O/P EST MOD 30 MIN: CPT | Performed by: INTERNAL MEDICINE

## 2021-10-13 PROCEDURE — 1123F ACP DISCUSS/DSCN MKR DOCD: CPT | Performed by: INTERNAL MEDICINE

## 2021-11-01 ENCOUNTER — OFFICE VISIT (OUTPATIENT)
Dept: CARDIOLOGY | Age: 71
End: 2021-11-01
Payer: MEDICARE

## 2021-11-01 ENCOUNTER — OFFICE VISIT (OUTPATIENT)
Dept: PULMONOLOGY | Age: 71
End: 2021-11-01
Payer: MEDICARE

## 2021-11-01 VITALS
SYSTOLIC BLOOD PRESSURE: 132 MMHG | RESPIRATION RATE: 17 BRPM | HEART RATE: 84 BPM | DIASTOLIC BLOOD PRESSURE: 77 MMHG | WEIGHT: 230 LBS | OXYGEN SATURATION: 98 % | BODY MASS INDEX: 32.93 KG/M2 | HEIGHT: 70 IN

## 2021-11-01 VITALS
HEIGHT: 70 IN | WEIGHT: 227.2 LBS | RESPIRATION RATE: 18 BRPM | HEART RATE: 76 BPM | BODY MASS INDEX: 32.53 KG/M2 | OXYGEN SATURATION: 95 % | DIASTOLIC BLOOD PRESSURE: 81 MMHG | TEMPERATURE: 97.7 F | SYSTOLIC BLOOD PRESSURE: 136 MMHG

## 2021-11-01 DIAGNOSIS — G47.33 OSA (OBSTRUCTIVE SLEEP APNEA): Primary | ICD-10-CM

## 2021-11-01 DIAGNOSIS — R06.02 SOB (SHORTNESS OF BREATH): ICD-10-CM

## 2021-11-01 DIAGNOSIS — E66.09 OBESITY DUE TO EXCESS CALORIES, UNSPECIFIED OBESITY SEVERITY: ICD-10-CM

## 2021-11-01 DIAGNOSIS — I50.32 CHRONIC DIASTOLIC (CONGESTIVE) HEART FAILURE (HCC): ICD-10-CM

## 2021-11-01 DIAGNOSIS — I27.20 PULMONARY HYPERTENSION (HCC): ICD-10-CM

## 2021-11-01 DIAGNOSIS — Z99.89 OSA ON CPAP: ICD-10-CM

## 2021-11-01 DIAGNOSIS — I82.412 DVT FEMORAL (DEEP VENOUS THROMBOSIS) WITH THROMBOPHLEBITIS, LEFT (HCC): ICD-10-CM

## 2021-11-01 DIAGNOSIS — D45 POLYCYTHEMIA VERA (HCC): ICD-10-CM

## 2021-11-01 DIAGNOSIS — R00.2 PALPITATIONS: ICD-10-CM

## 2021-11-01 DIAGNOSIS — R09.82 POST-NASAL DRIP: ICD-10-CM

## 2021-11-01 DIAGNOSIS — I51.7 MODERATE LEFT VENTRICULAR HYPERTROPHY: ICD-10-CM

## 2021-11-01 DIAGNOSIS — G47.33 OSA ON CPAP: ICD-10-CM

## 2021-11-01 DIAGNOSIS — E78.2 MIXED HYPERLIPIDEMIA: ICD-10-CM

## 2021-11-01 DIAGNOSIS — I10 PRIMARY HYPERTENSION: ICD-10-CM

## 2021-11-01 DIAGNOSIS — I50.32 CHRONIC DIASTOLIC CONGESTIVE HEART FAILURE (HCC): Primary | ICD-10-CM

## 2021-11-01 PROCEDURE — 99214 OFFICE O/P EST MOD 30 MIN: CPT | Performed by: FAMILY MEDICINE

## 2021-11-01 PROCEDURE — 1123F ACP DISCUSS/DSCN MKR DOCD: CPT | Performed by: INTERNAL MEDICINE

## 2021-11-01 PROCEDURE — 4040F PNEUMOC VAC/ADMIN/RCVD: CPT | Performed by: FAMILY MEDICINE

## 2021-11-01 PROCEDURE — 1090F PRES/ABSN URINE INCON ASSESS: CPT | Performed by: INTERNAL MEDICINE

## 2021-11-01 PROCEDURE — 93005 ELECTROCARDIOGRAM TRACING: CPT | Performed by: FAMILY MEDICINE

## 2021-11-01 PROCEDURE — 99214 OFFICE O/P EST MOD 30 MIN: CPT | Performed by: INTERNAL MEDICINE

## 2021-11-01 PROCEDURE — 3017F COLORECTAL CA SCREEN DOC REV: CPT | Performed by: FAMILY MEDICINE

## 2021-11-01 PROCEDURE — G8484 FLU IMMUNIZE NO ADMIN: HCPCS | Performed by: INTERNAL MEDICINE

## 2021-11-01 PROCEDURE — G8484 FLU IMMUNIZE NO ADMIN: HCPCS | Performed by: FAMILY MEDICINE

## 2021-11-01 PROCEDURE — G8417 CALC BMI ABV UP PARAM F/U: HCPCS | Performed by: INTERNAL MEDICINE

## 2021-11-01 PROCEDURE — 4040F PNEUMOC VAC/ADMIN/RCVD: CPT | Performed by: INTERNAL MEDICINE

## 2021-11-01 PROCEDURE — G8427 DOCREV CUR MEDS BY ELIG CLIN: HCPCS | Performed by: INTERNAL MEDICINE

## 2021-11-01 PROCEDURE — G8399 PT W/DXA RESULTS DOCUMENT: HCPCS | Performed by: INTERNAL MEDICINE

## 2021-11-01 PROCEDURE — 93010 ELECTROCARDIOGRAM REPORT: CPT | Performed by: FAMILY MEDICINE

## 2021-11-01 PROCEDURE — G8399 PT W/DXA RESULTS DOCUMENT: HCPCS | Performed by: FAMILY MEDICINE

## 2021-11-01 PROCEDURE — 1036F TOBACCO NON-USER: CPT | Performed by: FAMILY MEDICINE

## 2021-11-01 PROCEDURE — G8417 CALC BMI ABV UP PARAM F/U: HCPCS | Performed by: FAMILY MEDICINE

## 2021-11-01 PROCEDURE — 1123F ACP DISCUSS/DSCN MKR DOCD: CPT | Performed by: FAMILY MEDICINE

## 2021-11-01 PROCEDURE — 1036F TOBACCO NON-USER: CPT | Performed by: INTERNAL MEDICINE

## 2021-11-01 PROCEDURE — 1090F PRES/ABSN URINE INCON ASSESS: CPT | Performed by: FAMILY MEDICINE

## 2021-11-01 PROCEDURE — 3017F COLORECTAL CA SCREEN DOC REV: CPT | Performed by: INTERNAL MEDICINE

## 2021-11-01 PROCEDURE — G8427 DOCREV CUR MEDS BY ELIG CLIN: HCPCS | Performed by: FAMILY MEDICINE

## 2021-11-01 RX ORDER — LOSARTAN POTASSIUM AND HYDROCHLOROTHIAZIDE 12.5; 1 MG/1; MG/1
1 TABLET ORAL DAILY
Qty: 90 TABLET | Refills: 3 | Status: SHIPPED | OUTPATIENT
Start: 2021-11-01 | End: 2021-11-30

## 2021-11-01 NOTE — PROGRESS NOTES
Marian Victor am scribing for and in the presence of Shane Garrison. Lupe RIGGS, MS, F.A.C.C..    Patient: Felicitas Daly  : 1950  Date of Visit: 2021    REASON FOR VISIT / CONSULTATION: 1 Year Follow Up (Hx:CHF,Dizziness,Palps,Pulm HTN,HTN,HLD. CAM  - Active. She has been doing well. Some congestion, dry cough. Some SOB at times, with stairs. Palpitations with eating chocolate or stress related. Denies: CP, Lightheaded/dizziness. )      Dear Artem Ellis, APRN - CNP,      I had the pleasure of seeing your patient Felicitas Daly in consultation today. As you know, Ms. Billy Tee is a 80 y.o. female with a history of polycythemia vera and pulmonary hypertension. Her echocardiogram on 2016 showed an ejection fraction of 55% with moderate pulmonary hypertension and severe left atrial enlargement (>40). Echocardiogram done on 2019 that showed EF of 55%. LV wall thickness is moderately increased. Moderate diastolic dysfunction is seen. Since the last time I saw Ms. Cortes she continues to do well. She does report having occasional palpitations but typically only notices this at night when she is sleeping on her left side. She sometimes reporting taking half a Xanax and it goes right away. Does report having increased stress at home as she has a high maintenance puppy. She does have some shortness of breath at times, but associates this with going up and down the stairs. She states is compared to last year she can still ambulate over the same distance without difficulty. She typically wears compression stockings to help with her swelling and notes increased swelling in her legs when she does not wear them. She continues to use her CPAP machine at night. She denied any current or recent chest pain, pressure or tightness. She denies having any lightheaded/dizziness.  She denies having abdominal pain, bleeding problems, bowel issues, problems with her medications or any other concerns at this time. No cough, fever or chills. No nausea or vomiting. Past Medical History:   Diagnosis Date    Activity intolerance 06/06/2018    Allergic rhinitis due to other allergen     Depressive disorder, not elsewhere classified     Diabetes [address] [address]     Diverticulosis     H/O echocardiogram 09/07/2016    EF 55%. LV wall thickness is mildly increased. Left atrium is severely dilated (>40) left atrial volume index of 41 ml/m2. Mild mitral and treicuspid regurg. Moderate pulmonary hypertension estimated right ventricular systolic pressure of 42 mmHg. Moderate diastolic dysfunction is seen.  Hypertension     LAYO (obstructive sleep apnea) 12/14/2015    Other and unspecified hyperlipidemia     Other seborrheic keratosis     Pain in lower limb 06/06/2018    Peripheral vascular disease (HCC)     Right leg. Had ablation done 6/2014. 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 31/09/5344    Varicose veins of left lower extremity with pain 06/06/2018    Venous (peripheral) insufficiency 06/06/2018    Venous insufficiency of both lower extremities 06/06/2018       CURRENT ALLERGIES: Seasonal and Tape [adhesive tape] REVIEW OF SYSTEMS: 14 systems were reviewed. Pertinent positives and negatives as above, all else negative.      Past Surgical History:   Procedure Laterality Date    BACK SURGERY Left 11/19/2020    BACK LESION EXCISION-LARGE LOWER LIPOMA performed by Avtar Elena DO at [address] COLONOSCOPY  10/10/2016 -diverticulosis,hemorrhoids    EXCISION/BIOPSY Left 11/19/2020    large lipoma    HYSTERECTOMY      2002    LEG SURGERY      left and right laser surgery    OTHER SURGICAL H[facility]ORY [facility]06[facility]    OTHER SURGICAL HISTORY Left 03/20/2019    DR Gomez[facility]/sclerotherapy with Asclera    Social History:  Social History     Tobacco Use    Smoking status: Never Smoker    Smokeless tobacco: Never Used   Vaping Use    Vaping Use: Never used   Substance Use Topics    Alcohol use: No    Drug use: Never        CURRENT MEDICATIONS:  Outpatient Medications Marked as Taking for the 11/1/21 encounter (Office Visit) with Fatemeh Harris MD   Medication Sig Dispense Refill    metoprolol succinate (TOPROL XL) 100 MG extended release tablet Take 1 tablet by mouth once daily 90 tablet 0    rosuvastatin (CRESTOR) 5 MG tablet Take 1 tablet by mouth nightly 90 tablet 0    levothyroxine (SYNTHROID) 50 MCG tablet Take 1 tablet by mouth every morning 90 tablet 1    fluticasone (FLONASE) 50 MCG/ACT nasal spray Use 2 spray(s) in each nostril once daily 16 g 0    metFORMIN (GLUCOPHAGE) 500 MG tablet Take 1 tablet by mouth 2 times daily (with meals) 180 tablet 1    Lancets MISC TEST BLOOD SUGAR ONCE DAILY AS DIRECTED.  FREESTYLE LANCETS DX:E11.9 100 each 1    amLODIPine (NORVASC) 10 MG tablet Take 1 tablet by mouth once daily 90 tablet 1    glimepiride (AMARYL) 1 MG tablet Take 1 tablet by mouth every morning 90 tablet 1    blood glucose test strips (FREESTYLE LITE) strip 1 each by Does not apply route daily DX--E11.9 NON- INSULIN DEPENDENT 100 each 3    glucose monitoring kit (FREESTYLE) monitoring kit 1 kit by Does not apply route daily 1 kit 0    aspirin 81 MG tablet Take 162 mg by mouth daily      Chlorpheniramine Maleate (ALLERGY RELIEF PO) Take by mouth as needed      vitamin D 1000 UNITS CAPS Take by mouth daily D3      Blood Glucose Monitoring evidence of gross cranial nerve deficit. Coordination appeared normal.   Skin: Skin is warm and dry. There is no rash or diaphoresis. Psychiatric: She has a normal mood and affect. Her speech is normal and behavior is normal.      MOST RECENT LABS ON RECORD:   Lab Results   Component Value Date    WBC 6.8 09/30/2021    HGB 15.4 09/30/2021    HCT 47.4 (H) 09/30/2021     09/30/2021    CHOL 119 (L) 11/13/2020    TRIG 90 11/13/2020    HDL 48 11/13/2020    ALT 24 08/03/2021    AST 20 08/03/2021     08/03/2021    K 4.1 08/03/2021     08/03/2021    CREATININE 0.50 08/03/2021    BUN 21 08/03/2021    CO2 26 08/03/2021    TSH 4.34 11/13/2020    LABA1C 7.4 (H) 08/03/2021       ASSESSMENT:  1. Chronic diastolic congestive heart failure ([address]    2. SOB (shortness of breath)    3. Palpitations    4. Pulmonary hypertension ([address]    5. Primary hypertension    6. Mixed hyperlipidemia    7. LAYO on CPAP    8. Moderate left ventricular hypertrophy       PLAN:    Chronic diastolic heart failure: New [place] Heart Association Class: IIa (Mild symptoms only in normal activities) Stable but a bit more lower extremity edema that last visit, Right>left possibly related to her recent Venoseal therapy with Dr. Jessica Gutiérrez  · Beta Blocker: Continue Metoprolol succinate (Toprol XL) 100 mg daily. · ACE Inibitor/ARB: STOP losartan (Cozaar) and START losartan/HCTZ (Hyzaar) 100/12.5 mg every morning. I also discussed the potential side effects of this medication including lightheadedness and dizziness and instructed them to stop the medication of this occurs and call our office if this occurs. · Diuretics: Not indicated at this time. · Heart failure counseling: I told them to start wearing lower extremity compression stockings and I advised them to try and keep their legs up whenever possible and to limit salt in their diet. · Because of this history, I ordered a echocardiogram to better assess the severity of this problem. · Lightheadedness/dizziness: Resolved since last visit. · Pharmacologic Therapy: Not indicated at this time. · Nonpharmacologic counseling: Because of her condition, I reminded her to try and keep herself well-hydrated and to take extra time when moving from laying to sitting, sitting to standing and standing to walking. I also explained to her to help improve her symptoms she should include 3 g sodium diet, 1 or 2 L of sports drinks daily, knee-high compressions stockings. · Intermittent Palpitations: Mostly with caffeine intake and related to increased stress, Currently well controlled and only mildly bothersome  · Non-Pharmacological Management: I advised Ms. Cortes to continue to try and avoid caffeine including dark chocolate as this is known to increase arrhythmias but at this point no additional testing or treatment is likely indicated. · Medical Management: Continue Toprol  mg daily. · Pulmonary hypertension: Continue aggressive blood pressure control. · Beta Blocker: Continue metoprolol succinate (Toprol XL) 100 mg once daily. · Calcium Channel Blocker: Continue amlodipine (Norvasc)      · ACE Inibitor/ARB: Continue losartan (Cozaar) 100 mg once daily    · I Ordered an Echocardiogram to re-assess Mr. Junior's ejection fraction and gauge the effectiveness of current therapy. Essential Hypertension with moderate LVH seen on echo and ECG: Borderline Controlled  · ACE Inibitor/ARB: Discontinue losartan and Start losartan/HCTZ (Hyzaar)100-12.5 mg tablet once daily. I also discussed the potential side effects of this medication including lightheadedness and dizziness and told her to stop the medication of this occurs and call our office if this occurs. · Calcium Channel Blocker: Continue amlodipine (Norvasc) 10 mg daily. · Beta Blocker: Continue metoprolol succinate (Toprol XL) 100 mg once daily.      · Because of this history, I ordered a echocardiogram to better assess the severity of this problem. · History of mild to moderate mitral and tricuspid regurgitation with severe left atrial enlargement >40:   · Additional Testing List: I ordered a echocardiogram to better assess for the etiology of this problem and to help guide future management    · Hyperlipidemia: Mixed  · Statin Therapy: Continue rosuvastatin (Crestor) 5 mg nightly. · She has a repeat lipid panel ordered per MATT Melo CNP for this November     Obstructive Sleep Apnea: Currently asymptomatic. Monitor for now.   o Continue using CPAP nightly     Finally, I recommended that she continue her other medications and follow up with you as previously scheduled. FOLLOW UP:   I told Ms. Cortes to call my office if she had any problems, but otherwise will plan on seeing her again Return in about 1 year (around 11/1/2022). However, I would be happy to see her sooner should the need arise. Once again, thank you for allowing me to participate in this patients care. Please do not hesitate to contact me could I be of further assistance. Sincerely,  Errol Xiong. Lupe RIGGS, MS, F.A.C.C. [address] [place] [address] Cardiology Specialist  [address] [place] [address]  Phone: [phone], Fax: [phone]    I believe that the risk of significant morbidity and mortality related to the patient's current medical conditions are: Intermediate. >30 minutes were spent during prep work, discussion and exam of the patient, and follow up documentation and all of their questions were answered. The documentation recorded by the scribe, accurately and completely reflects the services I personally performed and the decisions made by me. Judy Graves MD, MS, F.A.C.C.  November 1, 2021

## 2021-11-01 NOTE — PATIENT INSTRUCTIONS
SURVEY:    You may be receiving a survey from [employer] regarding your visit today. Please complete the survey to enable us to provide the highest quality of care to you and your family. If you cannot score us a very good on any question, please call the office to discuss how we could have made your experience a very good one. Thank you.

## 2021-11-01 NOTE — PATIENT INSTRUCTIONS
SURVEY:    You may be receiving a survey from [employer] regarding your visit today. Please complete the survey to enable us to provide the highest quality of care to you and your family. If you cannot score us a very good on any question, please call the office to discuss how we could have made your experience a very good one. Thank you.

## 2021-11-06 NOTE — PROGRESS NOTES
PULMONARY OP progress note        REFERRED BY: MATT Ramsey - CNP    REASON FOR CONSULTATION:  Chronic cough, pulmonary hypertension and sleep apnea    Patient is being seen in follow-up for-  1. LAYO (obstructive sleep apnea)    2. Post-nasal drip    3. Obesity due to excess calories, unspecified obesity severity    4. DVT femoral (deep venous thrombosis) with thrombophlebitis, left (HCC)    5. Pulmonary hypertension ([address]    6. Chronic diastolic (congestive) heart failure (HCC)    7. Polycythemia vera ([facility]          HISTORY OF PRESENT ILLNESS:    Denied any hospitalization or ER visit for any breathing issues since last evaluated by me  Denied any hoarseness of the voice  Denied any shortness of breath or wheezing. No hemoptysis. Has been using her auto CPAP machine with improvement in the daytime sleepiness and fatigue and tiredness  Her compliance has improved significantly  Has some postnasal discharge  No leg pain  No hemoptysis  No presyncope or syncope  No palpitations  No nasal discharge      PAST MEDICAL HISTORY:       Diagnosis Date    Activity intolerance 06/06/2018    Allergic rhinitis due to other allergen     Depressive disorder, not elsewhere classified     Diabetes ([facility] [address]     Diverticulosis     H/O echocardiogram 09/07/2016    EF 55%. LV wall thickness is mildly increased. Left atrium is severely dilated (>40) left atrial volume index of 41 ml/m2. Mild mitral and treicuspid regurg. Moderate pulmonary hypertension estimated right ventricular systolic pressure of 42 mmHg. Moderate diastolic dysfunction is seen.  Hypertension     LAYO (obstructive sleep apnea) 12/14/2015    Other and unspecified hyperlipidemia     Other seborrheic keratosis     Pain in lower limb 06/06/2018    Peripheral vascular disease (HCC)     Right leg. Had ablation done 6/2014.     Polycythemia vera(238.4)     Polycythemia vera(238.4)     Supraventricular tachycardia (HCC)     Type II tablet 1    amLODIPine (NORVASC) 10 MG tablet Take 1 tablet by mouth once daily 90 tablet 1    glimepiride (AMARYL) 1 MG tablet Take 1 tablet by mouth every morning 90 tablet 1    blood glucose test strips (FREESTYLE LITE) strip 1 each by Does not apply route daily DX--E11.9 NON- INSULIN DEPENDENT 100 each 3    glucose monitoring kit (FREESTYLE) monitoring kit 1 kit by Does not apply route daily 1 kit 0    aspirin 81 MG tablet Take 162 mg by mouth daily      Chlorpheniramine Maleate (ALLERGY RELIEF PO) Take by mouth as needed      vitamin D 1000 UNITS CAPS Take by mouth daily D3      Blood Glucose Monitoring Suppl (FREESTYLE LITE) KORY       Lancets MISC 1 each by Does not apply route daily Type II diabetes mellitus 396 each 3    Garlic 541 MG TABS Take  by mouth daily.  Coenzyme Q10 (COQ10) 50 MG CAPS Take  by mouth daily.  Multiple Vitamins-Minerals (THERAPEUTIC MULTIVITAMIN-MINERALS) tablet Take 1 tablet by mouth daily.  Acai Solorio 500 MG CAPS Take 1 tablet by mouth daily.  Calcium Citrate-Vitamin D (CITRACAL + D PO) Take by mouth daily       Omega-3 Fatty Acids (OMEGA 3 PO) Take 1 tablet by mouth daily       FLAXSEED OIL Take 1 tablet by mouth daily        No current facility-administered medications for this visit. Facility-Administered Medications Ordered in Other Visits   Medication Dose Route Frequency Provider Last Rate Last Admin    sodium chloride flush 0.9 % injection 10 mL  10 mL IntraVENous PRN Nasfat Kathy Toth MD        sodium chloride (PF) 0.9 % injection 10 mL  10 mL IntraVENous PRN Oscar Hagan MD           FAMILY HISTORY: family history includes Cancer in her father; Diabetes in her mother; Hearing Loss in her father; High Blood Pressure in her father and mother; Stroke in her mother. SOCIAL AND OCCUPATIONAL HEALTH:  The patient is a Never smoker. There  is not history of TB or TB exposure. There is not asbestos or silica dust exposure.   The patient reports does not have coal, foundry, quarry or [employer] exposure. Travel history reveals no significant history of risk factors for pulm disease. There is not  history of recreational or IV drug use. The patient does not pets, dogs, cats turtles or exotic birds. Review of Systems:  Review of Systems -   General ROS: Completed and except as mentioned above were negative   Psychological ROS:  Completed and except as mentioned above were negative  Ophthalmic ROS:  Completed and except as mentioned above were negative  ENT ROS:  Completed and except as mentioned above were negative  Allergy and Immunology ROS:  Completed and except as mentioned above were negative  Hematological and Lymphatic ROS:  Completed and except as mentioned above were negative  Endocrine ROS: Completed and except as mentioned above were negative  Breast ROS:  Completed and except as mentioned above were negative  Respiratory ROS:  Completed and except as mentioned above were negative  Cardiovascular ROS:  Completed and except as mentioned above were negative  Gastrointestinal ROS: Completed and except as mentioned above were negative  Genito-Urinary ROS:  Completed and except as mentioned above were negative  Musculoskeletal ROS:  Completed and except as mentioned above were negative  Neurological ROS:  Completed and except as mentioned above were negative  Dermatological ROS:  Completed and except as mentioned above were negative    SLEEP  No epistaxis or sore throat. Patient has been using her CPAP machine with improvement in the daytime sleepiness and fatigue and tiredness  No MVA. No edema.       PHYSICAL EXAMINATION:  Vitals:    11/01/21 1040 11/01/21 1047   BP: (!) 156/76 136/81   Site: Left Upper Arm Left Upper Arm   Position: Sitting Sitting   Cuff Size: Medium Adult Medium Adult   Pulse: 76    Resp: 18    Temp: 97.7 °F (36.5 °C)    TempSrc: Temporal    SpO2: 95%    Weight: 227 lb 3.2 oz (103.1 kg)    Height: 5' 10\" (1.778 m)      PHYSICAL EXAMINATION:  Vitals:    11/01/21 1040 11/01/21 1047   BP: (!) 156/76 136/81   Site: Left Upper Arm Left Upper Arm   Position: Sitting Sitting   Cuff Size: Medium Adult Medium Adult   Pulse: 76    Resp: 18    Temp: 97.7 °F (36.5 °C)    TempSrc: Temporal    SpO2: 95%    Weight: 227 lb 3.2 oz (103.1 kg)    Height: 5' 10\" (1.778 m)      Constitutional: This is a well developed, well nourished, 30-34.9 - Obesity Grade I 80y.o. year old female who is alert, oriented, cooperative and in no apparent distress. Head:normocephalic and atraumatic. EENT:  CARLOS. No conjunctival injections. Septum was midline, mucosa was without erythema, exudates or cobblestoning. No thrush was noted. MallampatiIII (soft palate, base of uvula visible)  Neck: Supple without thyromegaly. No elevated JVP. Trachea was midline. Respiratory: Chest was symmetrical without dullness to percussion. Breath sounds bilaterally were clear to auscultation. There were no wheezes, rhonchi or rales. There is no intercostal retraction or use of accessory muscles. No egophony noted. Cardiovascular: Regular without murmur, clicks, gallops or rubs. Abdomen: Slightly rounded and soft without organomegaly. No rebound, rigidity or guarding was appreciated. Lymphatic: No lymphadenopathy. Musculoskeletal: Normal curvature of the spine. No gross muscle weakness. Extremities: Has bilateral lower extremity edema and patient did not wear stockings, no ulcerations, tenderness, varicosities or erythema. Muscle size, tone and strength are normal.  No involuntary movements are noted. Skin:  Warm and dry. Good color, turgor and pigmentation. No lesions or scars.   No cyanosis or clubbing  Neurological/Psychiatric: The patient's general behavior, level of consciousness, thought content and emotional status is normal.          DATA:     Ventilation/perfusion scan was a very low probability for pulmonary embolism  Chest x-ray done simultaneously did not show any acute process  Echocardiogram showed grade 2 diastolic dysfunction without pulmonary artery hypertension  Polysomnogram showed evidence of mild sleep apnea with an AHI of 7 without any leg movements  Her last hematocrit was 43.5    Her compliance was 87 % for usage of more than 4 hours with auto CPAP machine with pressure range of 5 to 15 cm of water with a maximum pressure of 15 cm of water with an AHI of 8    Nocturnal recording oximetry on 8/13/2020 did not show any significant desaturation during sleep with CPAP therapy        IMPRESSION:   1. LAYO (obstructive sleep apnea)    2. Post-nasal drip    3. Obesity due to excess calories, unspecified obesity severity    4. DVT femoral (deep venous thrombosis) with thrombophlebitis, left (HCC)    5. Pulmonary hypertension ([address]    6. Chronic diastolic (congestive) heart failure (HCC)    7. Polycythemia vera ([facility] [address]                   PLAN:         Refills were provided-CPAP supplies  Educated and clarified the medication use. Continue using Flonase at least once a day 2 squirts each nostril  Recommended exercising with a gradual increase in the capability  Continue using allergy medications  Zaida Christine received counseling on the following healthy behaviors: nutrition, exercise and medication adherence  Recommend flu vaccination in the fall annually. She had flu vaccination for the season  Recommendations given regarding pneumococcal vaccinations. Had the COVID-19 vaccination  Patient is up-to-date with vaccinations from pulmonary perspective. Maintain an active lifestyle. All the questions that the patient and the family has had were answered to their satisfaction. Home O2 evaluation to be done.   Supplemental oxygen was not needed  As the patient was a never smoker, she would not need any screening for lung cancer  Auto CPAP machine with a range of 5 to 15 cm of water to be used every night for at least 4 hours  Weight loss was

## 2021-11-23 ENCOUNTER — HOSPITAL ENCOUNTER (OUTPATIENT)
Age: 71
Setting detail: SPECIMEN
Discharge: HOME | End: 2021-11-23

## 2021-11-23 LAB
ANION GAP SERPL CALCULATED.3IONS-SCNC: 15 MMOL/L (ref 9–17)
BUN BLDV-MCNC: 23 MG/DL (ref 8–23)
BUN/CREAT BLD: ABNORMAL (ref 9–20)
CALCIUM SERPL-MCNC: 9.7 MG/DL (ref 8.6–10.4)
CHLORIDE BLD-SCNC: 105 MMOL/L (ref 98–107)
CO2: 20 MMOL/L (ref 20–31)
CREAT SERPL-MCNC: 0.45 MG/DL (ref 0.5–0.9)
GFR AFRICAN AMERICAN: >60 ML/MIN
GFR NON-AFRICAN AMERICAN: >60 ML/MIN
GFR SERPL CREATININE-BSD FRML MDRD: ABNORMAL ML/MIN/{1.73_M2}
GFR SERPL CREATININE-BSD FRML MDRD: ABNORMAL ML/MIN/{1.73_M2}
GLUCOSE BLD-MCNC: 108 MG/DL (ref 70–99)
POTASSIUM SERPL-SCNC: 4.3 MMOL/L (ref 3.7–5.3)
SODIUM BLD-SCNC: 140 MMOL/L (ref 135–144)

## 2021-11-24 ENCOUNTER — TELEPHONE (OUTPATIENT)
Dept: CARDIOLOGY | Age: 71
End: 2021-11-24

## 2021-11-24 NOTE — TELEPHONE ENCOUNTER
----- Message from Tarsha Coronado MD sent at 11/24/2021 12:10 AM EST -----  Let Ms. Cortes know their test result was ok. Will discuss at next visit. Thanks.

## 2021-12-01 ENCOUNTER — TELEPHONE (OUTPATIENT)
Dept: CARDIOLOGY | Age: 71
End: 2021-12-01

## 2021-12-01 NOTE — TELEPHONE ENCOUNTER
Ms. Gupta Peabody called and states she did not feel well on Losartan/HTCZ and PCP switched her back to losartan alone.  Just CHU     Thank lupillo Milton

## 2021-12-02 ENCOUNTER — HOSPITAL ENCOUNTER (OUTPATIENT)
Age: 71
Setting detail: SPECIMEN
Discharge: HOME | End: 2021-12-02

## 2021-12-02 LAB
ABSOLUTE EOS #: 0.22 K/UL (ref 0–0.44)
ABSOLUTE IMMATURE GRANULOCYTE: <0.03 K/UL (ref 0–0.3)
ABSOLUTE LYMPH #: 1.4 K/UL (ref 1.1–3.7)
ABSOLUTE MONO #: 0.49 K/UL (ref 0.1–1.2)
BASOPHILS # BLD: 1 % (ref 0–2)
BASOPHILS ABSOLUTE: 0.06 K/UL (ref 0–0.2)
DIFFERENTIAL TYPE: ABNORMAL
EOSINOPHILS RELATIVE PERCENT: 4 % (ref 1–4)
HCT VFR BLD CALC: 49.9 % (ref 36.3–47.1)
HEMOGLOBIN: 14.9 G/DL (ref 11.9–15.1)
IMMATURE GRANULOCYTES: 0 %
LYMPHOCYTES # BLD: 28 % (ref 24–43)
MCH RBC QN AUTO: 27.6 PG (ref 25.2–33.5)
MCHC RBC AUTO-ENTMCNC: 29.9 G/DL (ref 28.4–34.8)
MCV RBC AUTO: 92.4 FL (ref 82.6–102.9)
MONOCYTES # BLD: 10 % (ref 3–12)
NRBC AUTOMATED: 0 PER 100 WBC
PDW BLD-RTO: 14.6 % (ref 11.8–14.4)
PLATELET # BLD: 206 K/UL (ref 138–453)
PLATELET ESTIMATE: ABNORMAL
PMV BLD AUTO: 11.4 FL (ref 8.1–13.5)
RBC # BLD: 5.4 M/UL (ref 3.95–5.11)
RBC # BLD: ABNORMAL 10*6/UL
SEG NEUTROPHILS: 57 % (ref 36–65)
SEGMENTED NEUTROPHILS ABSOLUTE COUNT: 2.9 K/UL (ref 1.5–8.1)
WBC # BLD: 5.1 K/UL (ref 3.5–11.3)
WBC # BLD: ABNORMAL 10*3/UL

## 2021-12-03 LAB
ALBUMIN SERPL-MCNC: 4.3 G/DL (ref 3.5–5.2)
ALBUMIN/GLOBULIN RATIO: 2 (ref 1–2.5)
ALP BLD-CCNC: 76 U/L (ref 35–104)
ALT SERPL-CCNC: 22 U/L (ref 5–33)
ANION GAP SERPL CALCULATED.3IONS-SCNC: 12 MMOL/L (ref 9–17)
AST SERPL-CCNC: 24 U/L
BILIRUB SERPL-MCNC: 0.65 MG/DL (ref 0.3–1.2)
BUN BLDV-MCNC: 16 MG/DL (ref 8–23)
BUN/CREAT BLD: ABNORMAL (ref 9–20)
CALCIUM SERPL-MCNC: 9.2 MG/DL (ref 8.6–10.4)
CHLORIDE BLD-SCNC: 105 MMOL/L (ref 98–107)
CHOLESTEROL, FASTING: 108 MG/DL
CHOLESTEROL/HDL RATIO: 3
CO2: 23 MMOL/L (ref 20–31)
CREAT SERPL-MCNC: 0.43 MG/DL (ref 0.5–0.9)
CREATININE URINE: 44.7 MG/DL (ref 28–217)
ESTIMATED AVERAGE GLUCOSE: 163 MG/DL
GFR AFRICAN AMERICAN: >60 ML/MIN
GFR NON-AFRICAN AMERICAN: >60 ML/MIN
GFR SERPL CREATININE-BSD FRML MDRD: ABNORMAL ML/MIN/{1.73_M2}
GFR SERPL CREATININE-BSD FRML MDRD: ABNORMAL ML/MIN/{1.73_M2}
GLUCOSE BLD-MCNC: 142 MG/DL (ref 70–99)
HBA1C MFR BLD: 7.3 % (ref 4–6)
HDLC SERPL-MCNC: 36 MG/DL
LDL CHOLESTEROL: 60 MG/DL (ref 0–130)
MICROALBUMIN/CREAT 24H UR: <12 MG/L
MICROALBUMIN/CREAT UR-RTO: NORMAL MCG/MG CREAT
POTASSIUM SERPL-SCNC: 4.2 MMOL/L (ref 3.7–5.3)
SODIUM BLD-SCNC: 140 MMOL/L (ref 135–144)
TOTAL PROTEIN: 6.5 G/DL (ref 6.4–8.3)
TRIGLYCERIDE, FASTING: 60 MG/DL
TSH SERPL DL<=0.05 MIU/L-ACNC: 3.73 MIU/L (ref 0.3–5)
VLDLC SERPL CALC-MCNC: ABNORMAL MG/DL (ref 1–30)

## 2021-12-08 ENCOUNTER — OFFICE VISIT (OUTPATIENT)
Dept: VASCULAR SURGERY | Age: 71
End: 2021-12-08
Payer: MEDICARE

## 2021-12-08 ENCOUNTER — HOSPITAL ENCOUNTER (OUTPATIENT)
Dept: INFUSION THERAPY | Age: 71
Discharge: HOME | End: 2021-12-08
Payer: MEDICARE

## 2021-12-08 VITALS
SYSTOLIC BLOOD PRESSURE: 148 MMHG | HEART RATE: 77 BPM | DIASTOLIC BLOOD PRESSURE: 78 MMHG | RESPIRATION RATE: 18 BRPM | TEMPERATURE: 99.2 F

## 2021-12-08 VITALS
TEMPERATURE: 97.8 F | BODY MASS INDEX: 32.14 KG/M2 | DIASTOLIC BLOOD PRESSURE: 63 MMHG | RESPIRATION RATE: 18 BRPM | HEART RATE: 78 BPM | SYSTOLIC BLOOD PRESSURE: 133 MMHG | WEIGHT: 224 LBS

## 2021-12-08 DIAGNOSIS — I83.811 VARICOSE VEINS OF LEG WITH PAIN, RIGHT: Primary | ICD-10-CM

## 2021-12-08 DIAGNOSIS — D45 POLYCYTHEMIA VERA (HCC): Primary | ICD-10-CM

## 2021-12-08 PROCEDURE — 99214 OFFICE O/P EST MOD 30 MIN: CPT | Performed by: INTERNAL MEDICINE

## 2021-12-08 PROCEDURE — 99213 OFFICE O/P EST LOW 20 MIN: CPT | Performed by: INTERNAL MEDICINE

## 2021-12-08 PROCEDURE — 99195 PHLEBOTOMY: CPT

## 2021-12-08 PROCEDURE — 4040F PNEUMOC VAC/ADMIN/RCVD: CPT | Performed by: INTERNAL MEDICINE

## 2021-12-08 PROCEDURE — G8417 CALC BMI ABV UP PARAM F/U: HCPCS | Performed by: INTERNAL MEDICINE

## 2021-12-08 PROCEDURE — 3017F COLORECTAL CA SCREEN DOC REV: CPT | Performed by: INTERNAL MEDICINE

## 2021-12-08 PROCEDURE — G8399 PT W/DXA RESULTS DOCUMENT: HCPCS | Performed by: INTERNAL MEDICINE

## 2021-12-08 PROCEDURE — 1036F TOBACCO NON-USER: CPT | Performed by: INTERNAL MEDICINE

## 2021-12-08 PROCEDURE — 1123F ACP DISCUSS/DSCN MKR DOCD: CPT | Performed by: INTERNAL MEDICINE

## 2021-12-08 PROCEDURE — 1090F PRES/ABSN URINE INCON ASSESS: CPT | Performed by: INTERNAL MEDICINE

## 2021-12-08 PROCEDURE — G8484 FLU IMMUNIZE NO ADMIN: HCPCS | Performed by: INTERNAL MEDICINE

## 2021-12-08 PROCEDURE — G8427 DOCREV CUR MEDS BY ELIG CLIN: HCPCS | Performed by: INTERNAL MEDICINE

## 2021-12-08 RX ORDER — 0.9 % SODIUM CHLORIDE 0.9 %
250 INTRAVENOUS SOLUTION INTRAVENOUS ONCE
Status: CANCELLED | OUTPATIENT
Start: 2021-12-08 | End: 2021-12-08

## 2021-12-08 RX ORDER — 0.9 % SODIUM CHLORIDE 0.9 %
500 INTRAVENOUS SOLUTION INTRAVENOUS ONCE
Status: CANCELLED | OUTPATIENT
Start: 2021-12-08 | End: 2021-12-08

## 2021-12-08 NOTE — PROGRESS NOTES
Maryellen Garcia was seen on 12/8/2021 for   Chief Complaint   Patient presents with    Follow-up     check up post procedure in Aug Still some pain   . 5/9/18 First recent vascular visit. PCP Dr. Sachi Aviles. R leg GSV laser De Iban Ruby V, 6/2/14, followed by L 4/30/15 laser c stab phlebectomy. Had pain, swelling, no skin breakdown, hyperpigmentation. Initial concern was for dvt, but studies were neg. Mom had varicose veins. Not treated. On feet at dept store all day. Legs felt btr after laser, still wore stockings, has developed sx last year. Both legs had swelling and pain. Thigh veins prominent. UPDATE 8/22/18 oN 6/6/18 had LGSV foam, then 6/13/18 RGSV. 6/27/18 DUS showed LPTV DVT, with bilateral GSV occlusive thrombus. Still has L thigh varicosity patent. Legs feel better. Still wearing support stockings. On her feet a lot. UPDATE 2/19/19 Right leg is much better. Has developed a small thigh varicosity which is mildly tender. Left leg had residual branch which was not sclerosed on 1st procedure, with possible plan to address later, probably with foam. Still has some thigh pain. UPDATE 4/3/19 Since last visit had 3/20/19 left lateral thigh polidocanol foam therapy using butterfly needle. Mild soreness post procedure with hyperpigmentation. Duplex 3/27/19 showed that veins in lateral left thigh and in knee area are clotted with no DVT. UPDATE 10/9/19 Legs have been doing well. Not painful. Notices left gaiter hyperpigmentation. Spider veins right medial foot. Lateral foot has varicose veins. Wearing compression stockings daily. UPDATE 7/8/20 Almost to 51th wedding anniversary. Had 2 months of left posterior knee pain, popliteal fossa area. Has gone away. No changes on foot or toes. Painful veins have resolved. Still has spider veins. UPDATE 7/14/21 Now  47 yrs (7/11) Right leg aches at night. Moving around makes her leg bother her more. Interferes with sleep.  Had foam in 2018 after US showed multiple levels of superficial reflux. Wears compression  UPDATE 8/4/21 7/28/21 DUPLEX shows RGSV is still patent.  There are multiple distended brs thigh, calf, ankle. Also a 5.2 mm perf with 4883 msec. SFJ 3544    Thigh 4961  5.9  Knee 3189  4.1  Calf  2767  7.6  Ankle 1078     SSV distended brs  2144 msec 4 mm  UPDATE 9/8/21 On 8/31/21 had venaseal rgsv from mid thigh distally, also ssv. F/u duplex 9/3/21 showed good closure and no DVT. Has been wearing compression. Has pain on right lateral knee. UPDATE 12/8/21 Right leg had gaiter distribution tenderness, hard. Lateral knee pain has improved.       Social History     Socioeconomic History    Marital status:      Spouse name: Not on file    Number of children: Not on file    Years of education: Not on file    Highest education level: Not on file   Occupational History    Not on file   Tobacco Use    Smoking status: Never Smoker    Smokeless tobacco: Never Used   Vaping Use    Vaping Use: Never used   Substance and Sexual Activity    Alcohol use: No    Drug use: Never    Sexual activity: Not on file   Other Topics Concern    Not on file   Social History Narrative    Not on file     Social Determinants of Health     Financial Resource Strain: Low Risk     Difficulty of Paying Living Expenses: Not hard at all   Food Insecurity: No Food Insecurity    Worried About Running Out of Food in the Last Year: Never true    Greg of Food in the Last Year: Never true   Transportation Needs: No Transportation Needs    Lack of Transportation (Medical): No    Lack of Transportation (Non-Medical):  No   Physical Activity: Insufficiently Active    Days of Exercise per Week: 2 days    Minutes of Exercise per Session: 30 min   Stress:     Feeling of Stress : Not on file   Social Connections:     Frequency of Communication with Friends and Family: Not on file    Frequency of Social Gatherings with Friends and Family: Not on file    Attends hypothyroidism     Varicose veins of left lower extremity with complications 18/82/4082    Varicose veins of left lower extremity with pain 06/06/2018    Venous (peripheral) insufficiency 06/06/2018    Venous insufficiency of both lower extremities 06/06/2018     Current Outpatient Medications   Medication Sig Dispense Refill    fluticasone (FLONASE) 50 MCG/ACT nasal spray 2 sprays by Nasal route daily 16 g 0    rosuvastatin (CRESTOR) 5 MG tablet Take 1 tablet by mouth nightly 90 tablet 1    metoprolol succinate (TOPROL XL) 100 MG extended release tablet Take 1 tablet by mouth daily 90 tablet 1    glimepiride (AMARYL) 2 MG tablet Take 1 tablet by mouth every morning 90 tablet 1    hydrOXYzine (ATARAX) 25 MG tablet Take 1 tablet by mouth every 8 hours as needed for Anxiety 30 tablet 0    losartan (COZAAR) 100 MG tablet Take 1 tablet by mouth daily 90 tablet 1    metFORMIN (GLUCOPHAGE) 500 MG tablet Take 2 tablets by mouth twice daily 360 tablet 0    levothyroxine (SYNTHROID) 50 MCG tablet Take 1 tablet by mouth every morning 90 tablet 1    amLODIPine (NORVASC) 10 MG tablet Take 1 tablet by mouth once daily 90 tablet 1    blood glucose test strips (FREESTYLE LITE) strip 1 each by Does not apply route daily DX--E11.9 NON- INSULIN DEPENDENT 100 each 3    glucose monitoring kit (FREESTYLE) monitoring kit 1 kit by Does not apply route daily 1 kit 0    aspirin 81 MG tablet Take 162 mg by mouth daily      Chlorpheniramine Maleate (ALLERGY RELIEF PO) Take by mouth as needed      vitamin D 1000 UNITS CAPS Take by mouth daily D3      Blood Glucose Monitoring Suppl (FREESTYLE LITE) KORY       Lancets MISC 1 each by Does not apply route daily Type II diabetes mellitus 067 each 3    Garlic 851 MG TABS Take  by mouth daily.  Coenzyme Q10 (COQ10) 50 MG CAPS Take  by mouth daily.  Multiple Vitamins-Minerals (THERAPEUTIC MULTIVITAMIN-MINERALS) tablet Take 1 tablet by mouth daily.       Acai Berry 500 MG CAPS Take 1 tablet by mouth daily.  Calcium Citrate-Vitamin D (CITRACAL + D PO) Take by mouth daily       Omega-3 Fatty Acids (OMEGA 3 PO) Take 1 tablet by mouth daily       FLAXSEED OIL Take 1 tablet by mouth daily        No current facility-administered medications for this visit. Facility-Administered Medications Ordered in Other Visits   Medication Dose Route Frequency Provider Last Rate Last Admin    sodium chloride flush 0.9 % injection 10 mL  10 mL IntraVENous PRN Serena Garcia MD        sodium chloride (PF) 0.9 % injection 10 mL  10 mL IntraVENous PRN Jessica Mena MD             Physical findings:  General- no acute distress, oriented  HEENT - no xanthelasma, external ears normal  Skin- warm, dry, no skin breakdown or gangrene  Extremities - 1+ right edema, gaiter distribution. Reticular veins. Hyperpigmentation right gaiter        Assessment:  Encounter Diagnosis   Name Primary?  Varicose veins of leg with pain, right Yes   appropriate response to venaseal    Plan of care:  25 min chart review and pt encounter  rtc 6 mo  Follow up and evaluate.        Electronically signed by Ana Cristina Leroy MD on 12/8/21 at 9:47 AM EST

## 2021-12-08 NOTE — PROGRESS NOTES
Silvia Tejeda was seen on 12/8/2021 for   Chief Complaint   Patient presents with    Follow-up     check up post procedure in Aug Still some pain   .                             REVIEW OF SYSTEMS    Constitutional Weight: absent, A, Fatigue: absent Fever: absent   HEENT Ears: normal,Visual disturbance: absent Sore throat: absent,    Respiratory Shortness of breath: absent, Cough: absent;, Snoring: absent   Cardivascular Chest pain: absent,  Leg pain: present,Leg swelling:present, Non-healing wound:absent    GI Diarrhea: absent, Abdominal Pain: absent    Urinary frequency: absent, Urinary incontinence: absent   Musculoskeletal Neck pain: absent, Back pain: absent, Restless Leg:absent     Dermatological Hair loss: absent, Skin changes: absent   Neurological  Sudden Loss of Vision in one eye:absent, Slurred Speech:absent, Weakness on one side of body: absent,Headache: absent   Psychiatric Anxiety: absent, Depression: absent   Hematologic Abnormal bleeding: absent,

## 2021-12-08 NOTE — PATIENT INSTRUCTIONS
SURVEY:    You may be receiving a survey from [employer] regarding your visit today. Please complete the survey to enable us to provide the highest quality of care to you and your family. If you cannot score us a very good on any question, please call the office to discuss how we could have made your experience a very good one. Thank you.

## 2021-12-08 NOTE — PROGRESS NOTES
Pt called and stated she had blood work drawn for another physician and pt noted she had an elevated Hct and requested to have a phlebotomy. Pt talked with Cee Arriaga RN and Cee Arriaga RN scheduled pt for phlebotomy. Pt states she has had some mild chest pain and dizziness and she notices this when\"my blood is thick\". 5261 left AC accessed with 16G closed bag system. Immediate flow of blood. Instructed pt if having any lightheadedness or dizziness to alert nurse. 0815 Pt tolerating well. Denies any issues. 5702 Phlebotomy completed collected 500ml of blood. Pt tolerated entire procedure with no problems. Pt sipped on cups of water throughout procedure. 0824 /79 P82 Pt denies any dizziness or lightheadedness. Sipping on water. Resp easy. 0829 /75 P76 sitting upright in chair, resp easy. Denies any issues, sipping water. 0834 /76 P74 Resp easy, denies any dizziness or lightheadedness. Resp easy. Finished all the water. Instructed to continue drinking water throughout the day. Told if having any lightheadedness to take a break. Verbalizes understanding and denies any questions. Pt has previous appt scheduled for Feb. Pt discharged at this time.

## 2022-01-31 ENCOUNTER — HOSPITAL ENCOUNTER (OUTPATIENT)
Age: 72
Setting detail: SPECIMEN
Discharge: HOME | End: 2022-01-31

## 2022-01-31 DIAGNOSIS — D45 POLYCYTHEMIA VERA (HCC): ICD-10-CM

## 2022-01-31 LAB
ABSOLUTE EOS #: 0.16 K/UL (ref 0–0.44)
ABSOLUTE IMMATURE GRANULOCYTE: <0.03 K/UL (ref 0–0.3)
ABSOLUTE LYMPH #: 1.21 K/UL (ref 1.1–3.7)
ABSOLUTE MONO #: 0.67 K/UL (ref 0.1–1.2)
BASOPHILS # BLD: 1 % (ref 0–2)
BASOPHILS ABSOLUTE: 0.05 K/UL (ref 0–0.2)
DIFFERENTIAL TYPE: ABNORMAL
EOSINOPHILS RELATIVE PERCENT: 3 % (ref 1–4)
HCT VFR BLD CALC: 45 % (ref 36.3–47.1)
HEMOGLOBIN: 14.1 G/DL (ref 11.9–15.1)
IMMATURE GRANULOCYTES: 0 %
LYMPHOCYTES # BLD: 21 % (ref 24–43)
MCH RBC QN AUTO: 27.7 PG (ref 25.2–33.5)
MCHC RBC AUTO-ENTMCNC: 31.3 G/DL (ref 28.4–34.8)
MCV RBC AUTO: 88.4 FL (ref 82.6–102.9)
MONOCYTES # BLD: 12 % (ref 3–12)
NRBC AUTOMATED: 0 PER 100 WBC
PDW BLD-RTO: 14.3 % (ref 11.8–14.4)
PLATELET # BLD: 179 K/UL (ref 138–453)
PLATELET ESTIMATE: ABNORMAL
PMV BLD AUTO: 11.8 FL (ref 8.1–13.5)
RBC # BLD: 5.09 M/UL (ref 3.95–5.11)
RBC # BLD: ABNORMAL 10*6/UL
SEG NEUTROPHILS: 63 % (ref 36–65)
SEGMENTED NEUTROPHILS ABSOLUTE COUNT: 3.73 K/UL (ref 1.5–8.1)
WBC # BLD: 5.8 K/UL (ref 3.5–11.3)
WBC # BLD: ABNORMAL 10*3/UL

## 2022-02-08 ENCOUNTER — HOSPITAL ENCOUNTER (OUTPATIENT)
Dept: INFUSION THERAPY | Age: 72
Discharge: HOME | End: 2022-02-08
Payer: MEDICARE

## 2022-02-08 VITALS — DIASTOLIC BLOOD PRESSURE: 83 MMHG | SYSTOLIC BLOOD PRESSURE: 155 MMHG | HEART RATE: 72 BPM | RESPIRATION RATE: 18 BRPM

## 2022-02-08 DIAGNOSIS — D45 POLYCYTHEMIA VERA (HCC): Primary | ICD-10-CM

## 2022-02-08 PROCEDURE — 99195 PHLEBOTOMY: CPT

## 2022-02-08 RX ORDER — 0.9 % SODIUM CHLORIDE 0.9 %
500 INTRAVENOUS SOLUTION INTRAVENOUS ONCE
Status: CANCELLED | OUTPATIENT
Start: 2022-02-08 | End: 2022-02-08

## 2022-02-08 RX ORDER — 0.9 % SODIUM CHLORIDE 0.9 %
250 INTRAVENOUS SOLUTION INTRAVENOUS ONCE
Status: CANCELLED | OUTPATIENT
Start: 2022-02-08 | End: 2022-02-08

## 2022-02-08 NOTE — FLOWSHEET NOTE
Discussed with Jeannine Landin RN that pt HCT is 45% and perimeters of orders are >than 45%. Informed that pt having symptoms and still requests phlebotomy. Jeannine Landin RN agrees to go ahead with phebotomy.

## 2022-04-06 ENCOUNTER — HOSPITAL ENCOUNTER (OUTPATIENT)
Age: 72
Setting detail: SPECIMEN
Discharge: HOME | End: 2022-04-06

## 2022-04-06 DIAGNOSIS — E11.21 TYPE 2 DIABETES MELLITUS WITH DIABETIC NEPHROPATHY, WITHOUT LONG-TERM CURRENT USE OF INSULIN (HCC): ICD-10-CM

## 2022-04-06 LAB
ALBUMIN SERPL-MCNC: 4.5 G/DL (ref 3.5–5.2)
ALBUMIN/GLOBULIN RATIO: 2 (ref 1–2.5)
ALP BLD-CCNC: 78 U/L (ref 35–104)
ALT SERPL-CCNC: 21 U/L (ref 5–33)
ANION GAP SERPL CALCULATED.3IONS-SCNC: 13 MMOL/L (ref 9–17)
AST SERPL-CCNC: 16 U/L
BILIRUB SERPL-MCNC: 0.85 MG/DL (ref 0.3–1.2)
BUN BLDV-MCNC: 19 MG/DL (ref 8–23)
CALCIUM SERPL-MCNC: 10.2 MG/DL (ref 8.6–10.4)
CHLORIDE BLD-SCNC: 106 MMOL/L (ref 98–107)
CO2: 24 MMOL/L (ref 20–31)
CREAT SERPL-MCNC: 0.36 MG/DL (ref 0.5–0.9)
GFR AFRICAN AMERICAN: >60 ML/MIN
GFR NON-AFRICAN AMERICAN: >60 ML/MIN
GFR SERPL CREATININE-BSD FRML MDRD: ABNORMAL ML/MIN/{1.73_M2}
GLUCOSE BLD-MCNC: 103 MG/DL (ref 70–99)
POTASSIUM SERPL-SCNC: 4 MMOL/L (ref 3.7–5.3)
SODIUM BLD-SCNC: 143 MMOL/L (ref 135–144)
TOTAL PROTEIN: 6.7 G/DL (ref 6.4–8.3)

## 2022-04-07 LAB
ESTIMATED AVERAGE GLUCOSE: 163 MG/DL
HBA1C MFR BLD: 7.3 % (ref 4–6)

## 2022-05-02 ENCOUNTER — OFFICE VISIT (OUTPATIENT)
Dept: PULMONOLOGY | Age: 72
End: 2022-05-02
Payer: MEDICARE

## 2022-05-02 VITALS
RESPIRATION RATE: 16 BRPM | OXYGEN SATURATION: 95 % | DIASTOLIC BLOOD PRESSURE: 76 MMHG | HEART RATE: 67 BPM | SYSTOLIC BLOOD PRESSURE: 152 MMHG | BODY MASS INDEX: 32.51 KG/M2 | WEIGHT: 227.1 LBS | HEIGHT: 70 IN | TEMPERATURE: 98.5 F

## 2022-05-02 DIAGNOSIS — I27.20 PULMONARY HYPERTENSION (HCC): ICD-10-CM

## 2022-05-02 DIAGNOSIS — E66.09 OBESITY DUE TO EXCESS CALORIES, UNSPECIFIED OBESITY SEVERITY: ICD-10-CM

## 2022-05-02 DIAGNOSIS — I50.32 CHRONIC DIASTOLIC (CONGESTIVE) HEART FAILURE (HCC): ICD-10-CM

## 2022-05-02 DIAGNOSIS — D45 POLYCYTHEMIA VERA (HCC): ICD-10-CM

## 2022-05-02 DIAGNOSIS — G47.33 OSA (OBSTRUCTIVE SLEEP APNEA): Primary | ICD-10-CM

## 2022-05-02 DIAGNOSIS — I82.412 DVT FEMORAL (DEEP VENOUS THROMBOSIS) WITH THROMBOPHLEBITIS, LEFT (HCC): ICD-10-CM

## 2022-05-02 DIAGNOSIS — R09.82 POST-NASAL DRIP: ICD-10-CM

## 2022-05-02 PROCEDURE — G8399 PT W/DXA RESULTS DOCUMENT: HCPCS | Performed by: INTERNAL MEDICINE

## 2022-05-02 PROCEDURE — 4040F PNEUMOC VAC/ADMIN/RCVD: CPT | Performed by: INTERNAL MEDICINE

## 2022-05-02 PROCEDURE — 99214 OFFICE O/P EST MOD 30 MIN: CPT | Performed by: INTERNAL MEDICINE

## 2022-05-02 PROCEDURE — G8427 DOCREV CUR MEDS BY ELIG CLIN: HCPCS | Performed by: INTERNAL MEDICINE

## 2022-05-02 PROCEDURE — 1036F TOBACCO NON-USER: CPT | Performed by: INTERNAL MEDICINE

## 2022-05-02 PROCEDURE — 1123F ACP DISCUSS/DSCN MKR DOCD: CPT | Performed by: INTERNAL MEDICINE

## 2022-05-02 PROCEDURE — 1090F PRES/ABSN URINE INCON ASSESS: CPT | Performed by: INTERNAL MEDICINE

## 2022-05-02 PROCEDURE — G8417 CALC BMI ABV UP PARAM F/U: HCPCS | Performed by: INTERNAL MEDICINE

## 2022-05-02 PROCEDURE — 3017F COLORECTAL CA SCREEN DOC REV: CPT | Performed by: INTERNAL MEDICINE

## 2022-05-02 RX ORDER — FLUTICASONE PROPIONATE 50 MCG
2 SPRAY, SUSPENSION (ML) NASAL DAILY
Qty: 16 G | Refills: 5 | Status: SHIPPED | OUTPATIENT
Start: 2022-05-02

## 2022-05-02 NOTE — PROGRESS NOTES
PULMONARY OP progress note        REFERRED BY: MATT Cabrera - CNP    REASON FOR CONSULTATION:  Chronic cough, pulmonary hypertension and sleep apnea    Patient is being seen in follow-up for-  1. LAYO (obstructive sleep apnea)    2. Post-nasal drip    3. Obesity due to excess calories, unspecified obesity severity    4. DVT femoral (deep venous thrombosis) with thrombophlebitis, left (HCC)    5. Pulmonary hypertension ([address]    6. Chronic diastolic (congestive) heart failure (HCC)    7. Polycythemia vera ([address]          HISTORY OF PRESENT ILLNESS:    Denied any hospitalization or ER visit for any breathing issues since last evaluated by me  Denied any hoarseness of the voice  Denied any shortness of breath or wheezing. No hemoptysis. Has been using her auto CPAP machine with improvement in the daytime sleepiness and fatigue and tiredness  Her compliance has improved significantly  Has some postnasal discharge  No leg pain  No hemoptysis  No presyncope or syncope  No palpitations  No nasal discharge      PAST MEDICAL HISTORY:       Diagnosis Date    Activity intolerance 06/06/2018    Allergic rhinitis due to other allergen     Depressive disorder, not elsewhere classified     Diabetes ([address]     Diverticulosis     H/O echocardiogram 09/07/2016    EF 55%. LV wall thickness is mildly increased. Left atrium is severely dilated (>40) left atrial volume index of 41 ml/m2. Mild mitral and treicuspid regurg. Moderate pulmonary hypertension estimated right ventricular systolic pressure of 42 mmHg. Moderate diastolic dysfunction is seen.  Hypertension     LAYO (obstructive sleep apnea) 12/14/2015    Other and unspecified hyperlipidemia     Other seborrheic keratosis     Pain in lower limb 06/06/2018    Peripheral vascular disease (HCC)     Right leg. Had ablation done 6/2014.    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 96/01/2918    Varicose veins of left lower extremity with pain 06/06/2018    Venous (peripheral) insufficiency 06/06/2018    Venous insufficiency of both lower extremities 06/06/2018       SURGICAL HISTORY:    Past Surgical History:   Procedure Laterality Date    BACK SURGERY Left 11/19/2020    BACK LESION EXCISION-LARGE LOWER LIPOMA performed by Teresa Verdugo DO at [address]  10/10/2016    -diverticulosis,hemorrhoids    EXCISION/BIOPSY Left 11/19/2020    large lipoma    HYSTERECTOMY      2002    LEG SURGERY      left and right laser surgery    OTHER SURGICAL HISTORY Left 06/06/2018    sclerotherapy/Dr Nguyen/[facility]    OTHER SURGICAL HISTORY Left 03/20/2019    DR Gomez/[facility]/sclerotherapy with [facility]       ALLERGIES:    Allergies   Allergen Reactions    Seasonal     Tape Ressie Men Tape] Rash       MEDICATIONS:   Current Outpatient Medications   Medication Sig Dispense Refill    rosuvastatin (CRESTOR) 5 MG tablet Take 1 tablet by mouth nightly 90 tablet 0    amLODIPine (NORVASC) 10 MG tablet Take 1 tablet by mouth once daily 90 tablet 0    EUTHYROX 50 MCG tablet TAKE 1 TABLET BY MOUTH ONCE DAILY IN THE MORNING 90 tablet 0    FreeStyle Lancets MISC USE 1  TO CHECK GLUCOSE ONCE DAILY 100 each 0    fluticasone (FLONASE) 50 MCG/ACT nasal spray 2 sprays by Nasal route daily 16 g 0    metoprolol succinate (TOPROL XL) 100 MG extended release tablet Take 1 tablet by mouth daily 90 tablet 1    glimepiride (AMARYL) 2 MG tablet Take 1 tablet by mouth every morning 90 tablet 1    losartan (COZAAR) 100 MG tablet Take 1 tablet by mouth daily 90 tablet 1    metFORMIN (GLUCOPHAGE) 500 MG tablet Take 2 tablets by mouth twice daily 360 tablet 0    glucose monitoring kit (FREESTYLE) monitoring kit 1 kit by Does not apply route daily 1 kit 0    aspirin 81 MG tablet Take 162 mg by mouth daily      Chlorpheniramine Maleate (ALLERGY RELIEF PO) Take by mouth as needed      vitamin D 1000 UNITS CAPS Take by mouth daily D3      Garlic 776 MG TABS Take  by mouth daily.  Coenzyme Q10 (COQ10) 50 MG CAPS Take  by mouth daily.  Multiple Vitamins-Minerals (THERAPEUTIC MULTIVITAMIN-MINERALS) tablet Take 1 tablet by mouth daily.  Acai Solorio 500 MG CAPS Take 1 tablet by mouth daily.  Calcium Citrate-Vitamin D (CITRACAL + D PO) Take by mouth daily       Omega-3 Fatty Acids (OMEGA 3 PO) Take 1 tablet by mouth daily       FLAXSEED OIL Take 1 tablet by mouth daily       blood glucose test strips (FREESTYLE LITE) strip TEST BLOOD SUGAR ONCE DAILY AS DIRECTED: FREESTYLE METER DX:E11.9 100 each 2    Blood Glucose Monitoring Suppl (FREESTYLE LITE) KORY        No current facility-administered medications for this visit. Facility-Administered Medications Ordered in Other Visits   Medication Dose Route Frequency Provider Last Rate Last Admin    sodium chloride flush 0.9 % injection 10 mL  10 mL IntraVENous PRN Serena Melendez MD        sodium chloride (PF) 0.9 % injection 10 mL  10 mL IntraVENous PRN Bandar Marie MD           FAMILY HISTORY: family history includes Cancer in her father; Diabetes in her mother; Hearing Loss in her father; High Blood Pressure in her father and mother; Stroke in her mother. SOCIAL AND OCCUPATIONAL HEALTH:  The patient is a Never smoker. There  is not history of TB or TB exposure. There is not asbestos or silica dust exposure. The patient reports does not have coal, foundry, quarry or [employer] exposure.   Travel history reveals no significant history of risk factors for pulm disease. There is not  history of recreational or IV drug use. The patient does not pets, dogs, cats turtles or exotic birds. Review of Systems:  Review of Systems -   General ROS: Completed and except as mentioned above were negative   Psychological ROS:  Completed and except as mentioned above were negative  Ophthalmic ROS:  Completed and except as mentioned above were negative  ENT ROS:  Completed and except as mentioned above were negative  Allergy and Immunology ROS:  Completed and except as mentioned above were negative  Hematological and Lymphatic ROS:  Completed and except as mentioned above were negative  Endocrine ROS: Completed and except as mentioned above were negative  Breast ROS:  Completed and except as mentioned above were negative  Respiratory ROS:  Completed and except as mentioned above were negative  Cardiovascular ROS:  Completed and except as mentioned above were negative  Gastrointestinal ROS: Completed and except as mentioned above were negative  Genito-Urinary ROS:  Completed and except as mentioned above were negative  Musculoskeletal ROS:  Completed and except as mentioned above were negative  Neurological ROS:  Completed and except as mentioned above were negative  Dermatological ROS:  Completed and except as mentioned above were negative    SLEEP  No epistaxis or sore throat. Patient has been using her CPAP machine with improvement in the daytime sleepiness and fatigue and tiredness  No MVA. No edema.       PHYSICAL EXAMINATION:  Vitals:    05/02/22 0940 05/02/22 0943   BP: (!) 158/86 (!) 152/76   Pulse: 68 67   Resp: 16    Temp: 98.5 °F (36.9 °C)    SpO2: 95%    Weight: 227 lb 1.6 oz (103 kg)    Height: 5' 10\" (1.778 m)      PHYSICAL EXAMINATION:  Vitals:    05/02/22 0940 05/02/22 0943   BP: (!) 158/86 (!) 152/76   Pulse: 68 67   Resp: 16    Temp: 98.5 °F (36.9 °C)    SpO2: 95%    Weight: 227 lb 1.6 oz (103 kg)    Height: 5' 10\" (1.778 m)      Constitutional: This is a well developed, well nourished, 30-34.9 - Obesity Grade I 80y.o. year old female who is alert, oriented, cooperative and in no apparent distress. Head:normocephalic and atraumatic. EENT:  CARLOS. No conjunctival injections. Septum was midline, mucosa was without erythema, exudates or cobblestoning. No thrush was noted. MallampatiIII (soft palate, base of uvula visible)  Neck: Supple without thyromegaly. No elevated JVP. Trachea was midline. Respiratory: Chest was symmetrical without dullness to percussion. Breath sounds bilaterally were clear to auscultation. There were no wheezes, rhonchi or rales. There is no intercostal retraction or use of accessory muscles. No egophony noted. Cardiovascular: Regular without murmur, clicks, gallops or rubs. Abdomen: Slightly rounded and soft without organomegaly. No rebound, rigidity or guarding was appreciated. Lymphatic: No lymphadenopathy. Musculoskeletal: Normal curvature of the spine. No gross muscle weakness. Extremities: Has bilateral lower extremity edema and patient did not wear stockings, no ulcerations, tenderness, varicosities or erythema. Muscle size, tone and strength are normal.  No involuntary movements are noted. Skin:  Warm and dry. Good color, turgor and pigmentation. No lesions or scars.   No cyanosis or clubbing  Neurological/Psychiatric: The patient's general behavior, level of consciousness, thought content and emotional status is normal.          DATA:     Ventilation/perfusion scan was a very low probability for pulmonary embolism  Chest x-ray done simultaneously did not show any acute process  Echocardiogram showed grade 2 diastolic dysfunction without pulmonary artery hypertension  Polysomnogram showed evidence of mild sleep apnea with an AHI of 7 without any leg movements  Her last hematocrit was 43.5    Her compliance was 87 % for usage of more than 4 hours with auto CPAP machine with pressure range of 5 to 15 cm of water with a maximum pressure of 15 cm of water with an AHI of 8    Nocturnal recording oximetry on 8/13/2020 did not show any significant desaturation during sleep with CPAP therapy    Her compliance data showed 93% compliance for usage of more than 4 hours with an auto CPAP machine of 5 to 15 cm of water with an AHI of 7.3 with a maximum CPAP pressure of 14.9 cm of water    IMPRESSION:   1. LAYO (obstructive sleep apnea)    2. Post-nasal drip    3. Obesity due to excess calories, unspecified obesity severity    4. DVT femoral (deep venous thrombosis) with thrombophlebitis, left ([facility])    5. Pulmonary hypertension ([facility]    6. Chronic diastolic (congestive) heart failure ([facility])    7. Polycythemia vera ([facility]                   PLAN:       Reviewed the CPAP compliance information  Refills were provided-Flonase  Educated and clarified the medication use. Continue using Flonase at least once a day 2 squirts each nostril  Reiterated how to use the Flonase  Recommended exercising with a gradual increase in the capability  Continue using allergy medications  Lita Guerra received counseling on the following healthy behaviors: nutrition, exercise and medication adherence  Recommend flu vaccination in the fall annually. She had flu vaccination for the season  Recommendations given regarding pneumococcal vaccinations. Had the COVID-19 vaccination  Patient is up-to-date with vaccinations from pulmonary perspective. Maintain an active lifestyle. All the questions that the patient and the family has had were answered to their satisfaction. Home O2 evaluation to be done. Supplemental oxygen was not needed  As the patient was a never smoker, she would not need any screening for lung cancer  Auto CPAP machine with a range of 5 to 15 cm of water to be used every night for at least 4 hours  Weight loss was recommended and discussed.   Recommended following good sleep hygiene instructions. Explained importance of compliance with treatment of sleep apnea. Pt is not to drive if sleepy. We'll see the patient back in 6 months or earlier if needed. Patient will call us if she is sick, so she can be seen sooner. Thank you for having us involved in the care of your patient. Please call us if you have any questions or concerns.               Latasha Lincoln MD MD  5/2/2022 9:45 AM

## 2022-05-02 NOTE — PATIENT INSTRUCTIONS
SURVEY:    You may be receiving a survey from [employer] regarding your visit today. Please complete the survey to enable us to provide the highest quality of care to you and your family. If you cannot score us a very good on any question, please call the office to discuss how we could have made your experience a very good one. Thank you. Please recheck your blood pressure when you go home and make sure you take your prescribed medication if applicable . Please follow up with your PCP or ER if needed.

## 2022-05-31 ENCOUNTER — HOSPITAL ENCOUNTER (OUTPATIENT)
Age: 72
Discharge: HOME | End: 2022-05-31
Payer: MEDICARE

## 2022-05-31 DIAGNOSIS — D45 POLYCYTHEMIA VERA (HCC): ICD-10-CM

## 2022-05-31 LAB
ABSOLUTE EOS #: 0.17 K/UL (ref 0–0.44)
ABSOLUTE IMMATURE GRANULOCYTE: <0.03 K/UL (ref 0–0.3)
ABSOLUTE LYMPH #: 1.35 K/UL (ref 1.1–3.7)
ABSOLUTE MONO #: 0.65 K/UL (ref 0.1–1.2)
BASOPHILS # BLD: 1 % (ref 0–2)
BASOPHILS ABSOLUTE: 0.04 K/UL (ref 0–0.2)
EOSINOPHILS RELATIVE PERCENT: 3 % (ref 1–4)
HCT VFR BLD CALC: 46.8 % (ref 36.3–47.1)
HEMOGLOBIN: 14.4 G/DL (ref 11.9–15.1)
IMMATURE GRANULOCYTES: 0 %
LYMPHOCYTES # BLD: 23 % (ref 24–43)
MCH RBC QN AUTO: 26.4 PG (ref 25.2–33.5)
MCHC RBC AUTO-ENTMCNC: 30.8 G/DL (ref 28.4–34.8)
MCV RBC AUTO: 85.9 FL (ref 82.6–102.9)
MONOCYTES # BLD: 11 % (ref 3–12)
NRBC AUTOMATED: 0 PER 100 WBC
PDW BLD-RTO: 16 % (ref 11.8–14.4)
PLATELET # BLD: 188 K/UL (ref 138–453)
PMV BLD AUTO: 10 FL (ref 8.1–13.5)
RBC # BLD: 5.45 M/UL (ref 3.95–5.11)
SEG NEUTROPHILS: 62 % (ref 36–65)
SEGMENTED NEUTROPHILS ABSOLUTE COUNT: 3.67 K/UL (ref 1.5–8.1)
WBC # BLD: 5.9 K/UL (ref 3.5–11.3)

## 2022-05-31 PROCEDURE — 85025 COMPLETE CBC W/AUTO DIFF WBC: CPT

## 2022-05-31 PROCEDURE — 36415 COLL VENOUS BLD VENIPUNCTURE: CPT

## 2022-06-02 ENCOUNTER — HOSPITAL ENCOUNTER (OUTPATIENT)
Dept: INFUSION THERAPY | Age: 72
Discharge: HOME | End: 2022-06-02
Payer: MEDICARE

## 2022-06-02 VITALS
HEART RATE: 77 BPM | BODY MASS INDEX: 32.21 KG/M2 | DIASTOLIC BLOOD PRESSURE: 69 MMHG | SYSTOLIC BLOOD PRESSURE: 145 MMHG | TEMPERATURE: 97.8 F | WEIGHT: 225 LBS | RESPIRATION RATE: 18 BRPM | HEIGHT: 70 IN

## 2022-06-02 DIAGNOSIS — D45 POLYCYTHEMIA VERA (HCC): Primary | ICD-10-CM

## 2022-06-02 PROCEDURE — 99195 PHLEBOTOMY: CPT

## 2022-06-02 RX ORDER — 0.9 % SODIUM CHLORIDE 0.9 %
500 INTRAVENOUS SOLUTION INTRAVENOUS ONCE
Status: CANCELLED | OUTPATIENT
Start: 2022-06-02 | End: 2022-06-02

## 2022-06-02 RX ORDER — 0.9 % SODIUM CHLORIDE 0.9 %
250 INTRAVENOUS SOLUTION INTRAVENOUS ONCE
Status: CANCELLED | OUTPATIENT
Start: 2022-06-02 | End: 2022-06-02

## 2022-06-02 NOTE — PROGRESS NOTES
0900 Pt arrived for scheduled phlebotomy. Pt states she is well and has not had any problems. 5651 Left AC accessed with 16G closed bag system. Immediate flow of blood noted. Instructed pt to alert nurse if feeling dizzy or lightheaded. Pt verbalized understanding. She states she has always tolerated procedure well.   0913 Pt tolerating procedure well. Resp easy. Denies dizziness. 0915 Pt sipping on cup of water. Denies any feelings of dizziness or lightheadedness. Resp easy. 7103 Procedure completed. 500ml of blood collected without difficulty. Reminded pt to continue to drink water. Denies any dizziness. 0920 /65 P77 Resp easy. Drinking water. Resp easy. Denies any dizziness. 0925 /65 P76 Resp easy. Drinking water. Talking to nurse. Denies any dizziness or lightheadedness. 0930 /66 P72. Pt states she feels well. Resp easy. Pt continues to drink water. 0935 /66 P72 . Pt given discharge instructions at this time. Reminded to drink plenty of fluids for the rest of the day and if feeling lightheaded or dizzy to take a rest. Pt verbalized understanding and denies any needs or questions. 3113 Pt ambulated off unit, gait steady.

## 2022-06-02 NOTE — PLAN OF CARE
Problem: Chronic Conditions and Co-morbidities  Goal: Patient's chronic conditions and co-morbidity symptoms are monitored and maintained or improved  Outcome: Adequate for Discharge     Problem: KNOWLEDGE DEFICIT  Goal: Patient/S.O. demonstrates understanding of disease process, treatment plan, medications, and discharge instructions.   Outcome: Adequate for Discharge

## 2022-06-22 ENCOUNTER — OFFICE VISIT (OUTPATIENT)
Dept: VASCULAR SURGERY | Age: 72
End: 2022-06-22
Payer: MEDICARE

## 2022-06-22 VITALS
BODY MASS INDEX: 31.9 KG/M2 | TEMPERATURE: 97 F | DIASTOLIC BLOOD PRESSURE: 87 MMHG | RESPIRATION RATE: 18 BRPM | HEART RATE: 83 BPM | HEIGHT: 70 IN | SYSTOLIC BLOOD PRESSURE: 157 MMHG | WEIGHT: 222.8 LBS

## 2022-06-22 DIAGNOSIS — I87.2 VENOUS INSUFFICIENCY OF BOTH LOWER EXTREMITIES: Primary | ICD-10-CM

## 2022-06-22 PROCEDURE — 1090F PRES/ABSN URINE INCON ASSESS: CPT | Performed by: INTERNAL MEDICINE

## 2022-06-22 PROCEDURE — G8417 CALC BMI ABV UP PARAM F/U: HCPCS | Performed by: INTERNAL MEDICINE

## 2022-06-22 PROCEDURE — G8427 DOCREV CUR MEDS BY ELIG CLIN: HCPCS | Performed by: INTERNAL MEDICINE

## 2022-06-22 PROCEDURE — 3017F COLORECTAL CA SCREEN DOC REV: CPT | Performed by: INTERNAL MEDICINE

## 2022-06-22 PROCEDURE — 99214 OFFICE O/P EST MOD 30 MIN: CPT | Performed by: INTERNAL MEDICINE

## 2022-06-22 PROCEDURE — 1123F ACP DISCUSS/DSCN MKR DOCD: CPT | Performed by: INTERNAL MEDICINE

## 2022-06-22 PROCEDURE — 1036F TOBACCO NON-USER: CPT | Performed by: INTERNAL MEDICINE

## 2022-06-22 PROCEDURE — G8399 PT W/DXA RESULTS DOCUMENT: HCPCS | Performed by: INTERNAL MEDICINE

## 2022-06-22 NOTE — PATIENT INSTRUCTIONS
SURVEY:    You may be receiving a survey from [employer] regarding your visit today. Please complete the survey to enable us to provide the highest quality of care to you and your family. If you cannot score us a very good on any question, please call the office to discuss how we could have made your experience a very good one. Thank you.

## 2022-06-22 NOTE — PROGRESS NOTES
Kwasi Main was seen on 6/22/2022 for   Chief Complaint   Patient presents with    Follow-up     veins about the same little swelling in legs denies leg pain   .                             REVIEW OF SYSTEMS    Constitutional Weight: absent, A, Fatigue: absent Fever: absent   HEENT Ears: normal,Visual disturbance: absent Sore throat: absent,    Respiratory Shortness of breath: present, Cough: present;, Snoring: absent   Cardivascular Chest pain: absent,  Leg pain: absent,Leg swelling:absent, Non-healing wound:absent    GI Diarrhea: absent, Abdominal Pain: absent    Urinary frequency: absent, Urinary incontinence: absent   Musculoskeletal Neck pain: absent, Back pain: absent, Restless Leg:absent     Dermatological Hair loss: absent, Skin changes: absent   Neurological  Sudden Loss of Vision in one eye:absent, Slurred Speech:absent, Weakness on one side of body: absent,Headache: absent   Psychiatric Anxiety: absent, Depression: absent   Hematologic Abnormal bleeding: absent,

## 2022-06-22 NOTE — PROGRESS NOTES
Sumeet Chavez was seen on 6/22/2022 for   Chief Complaint   Patient presents with    Follow-up     veins about the same little swelling in legs denies leg pain   . 5/9/18 First recent vascular visit. PCP Dr. Nataly Quintero. R leg GSV laser [address], 6/2/14, followed by L 4/30/15 laser c stab phlebectomy. Had pain, swelling, no skin breakdown, hyperpigmentation. Initial concern was for dvt, but studies were neg. Mom had varicose veins. Not treated. On feet at dept store all day. Legs felt btr after laser, still wore stockings, has developed sx last year. Both legs had swelling and pain. Thigh veins prominent. UPDATE 8/22/18 oN 6/6/18 had LGSV foam, then 6/13/18 RGSV. 6/27/18 DUS showed LPTV DVT, with bilateral GSV occlusive thrombus. Still has L thigh varicosity patent. Legs feel better. Still wearing support stockings. On her feet a lot. UPDATE 2/19/19 Right leg is much better. Has developed a small thigh varicosity which is mildly tender. Left leg had residual branch which was not sclerosed on 1st procedure, with possible plan to address later, probably with foam. Still has some thigh pain. UPDATE 4/3/19 Since last visit had 3/20/19 left lateral thigh polidocanol foam therapy using butterfly needle. Mild soreness post procedure with hyperpigmentation. Duplex 3/27/19 showed that veins in lateral left thigh and in knee area are clotted with no DVT. UPDATE 10/9/19 Legs have been doing well. Not painful. Notices left gaiter hyperpigmentation. Spider veins right medial foot. Lateral foot has varicose veins. Wearing compression stockings daily. UPDATE 7/8/20 Almost to 51th wedding anniversary. Had 2 months of left posterior knee pain, popliteal fossa area. Has gone away. No changes on foot or toes. Painful veins have resolved. Still has spider veins. UPDATE 7/14/21 Now  47 yrs (7/11) Right leg aches at night. Moving around makes her leg bother her more. Interferes with sleep.  Had foam in 2018 after US showed multiple levels of superficial reflux. Wears compression  UPDATE 8/4/21 7/28/21 DUPLEX shows RGSV is still patent.  There are multiple distended brs thigh, calf, ankle. Also a 5.2 mm perf with 4883 msec. SFJ 3544    Thigh 4961  5.9  Knee 3189  4.1  Calf  2767  7.6  Ankle 1078     SSV distended brs  2144 msec 4 mm  UPDATE 9/8/21 On 8/31/21 had venaseal rgsv from mid thigh distally, also ssv. F/u duplex 9/3/21 showed good closure and no DVT. Has been wearing compression. Has pain on right lateral knee.   UPDATE 12/8/21 Right leg had gaiter distribution tenderness, hard. Lateral knee pain has improved. UPDATE 6/22/22 Wears compression which makes her legs feel better. Generally no issues with legs or veins. Most recent procedure was 8/13/21 rgsv and ssv venaseal.          Social History     Socioeconomic History    Marital status:      Spouse name: Not on file    Number of children: Not on file    Years of education: Not on file    Highest education level: Not on file   Occupational History    Not on file   Tobacco Use    Smoking status: Never Smoker    Smokeless tobacco: Never Used   Vaping Use    Vaping Use: Never used   Substance and Sexual Activity    Alcohol use: No    Drug use: Never    Sexual activity: Not on file   Other Topics Concern    Not on file   Social History Narrative    Not on file     Social Determinants of Health     Financial Resource Strain: Low Risk     Difficulty of Paying Living Expenses: Not hard at all   Food Insecurity: No Food Insecurity    Worried About [address] [employer] in the Last Year: Never true    [address] [place] in the Last Year: Never true   Transportation Needs: No Transportation Needs    Lack of Transportation (Medical): No    Lack of Transportation (Non-Medical):  No   Physical Activity: Insufficiently Active    Days of Exercise per Week: 2 days    Minutes of Exercise per Session: 20 min   Stress: No Stress Concern Present    Feeling of Stress : Not at all   Social Connections: Moderately Isolated    Frequency of Communication with Friends and Family: More than three times a week    Frequency of Social Gatherings with Friends and Family: More than three times a week    Attends Taoism Services: Never    Active Member of Clubs or Organizations: No    Attends Club or Organization Meetings: Never    Marital Status:    Intimate Partner Violence: Not At Risk    Fear of Current or Ex-Partner: No    Emotionally Abused: No    Physically Abused: No    Sexually Abused: No   Housing Stability:     Unable to Pay for Housing in the Last Year: Not on file    Number of [place] in the Last Year: Not on file    Unstable Housing in the Last Year: Not on file     Family History   Problem Relation Age of Onset    Diabetes Mother     High Blood Pressure Mother     Stroke Mother     Cancer Father     Hearing Loss Father     High Blood Pressure Father      Past Medical History:   Diagnosis Date    Activity intolerance 06/06/2018    Allergic rhinitis due to other allergen     Depressive disorder, not elsewhere classified     Diabetes ([facility] [address]     Diverticulosis     H/O echocardiogram 09/07/2016    EF 55%. LV wall thickness is mildly increased. Left atrium is severely dilated (>40) left atrial volume index of 41 ml/m2. Mild mitral and treicuspid regurg. Moderate pulmonary hypertension estimated right ventricular systolic pressure of 42 mmHg. Moderate diastolic dysfunction is seen.  Hypertension     LAYO (obstructive sleep apnea) 12/14/2015    Other and unspecified hyperlipidemia     Other seborrheic keratosis     Pain in lower limb 06/06/2018    Peripheral vascular disease (HCC)     Right leg. Had ablation done 6/2014.    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 87/19/3048    Varicose veins of left lower extremity with pain 06/06/2018    Venous (peripheral) insufficiency 06/06/2018    Venous insufficiency of both lower extremities 06/06/2018     Current Outpatient Medications   Medication Sig Dispense Refill    losartan (COZAAR) 100 MG tablet Take 1 tablet by mouth once daily 30 tablet 0    albuterol sulfate HFA (VENTOLIN HFA) 108 (90 Base) MCG/ACT inhaler Inhale 2 puffs into the lungs 4 times daily as needed for Wheezing 18 g 0    benzonatate (TESSALON) 100 MG capsule Take 1 capsule by mouth 3 times daily as needed for Cough 21 capsule 0    glimepiride (AMARYL) 2 MG tablet TAKE 1 TABLET BY MOUTH ONCE DAILY IN THE MORNING 90 tablet 0    EUTHYROX 50 MCG tablet TAKE 1 TABLET BY MOUTH ONCE DAILY IN THE MORNING 90 tablet 0    metFORMIN (GLUCOPHAGE) 500 MG tablet Take 2 tablets by mouth twice daily 360 tablet 0    FreeStyle Lancets MISC USE 1  TO CHECK GLUCOSE ONCE DAILY 100 each 0    fluticasone (FLONASE) 50 MCG/ACT nasal spray 2 sprays by Nasal route daily (Patient taking differently: 2 sprays by Nasal route daily as needed ) 16 g 5    blood glucose test strips (FREESTYLE LITE) strip TEST BLOOD SUGAR ONCE DAILY AS DIRECTED: FREESTYLE METER DX:E11.9 100 each 2    rosuvastatin (CRESTOR) 5 MG tablet Take 1 tablet by mouth nightly 90 tablet 0    amLODIPine (NORVASC) 10 MG tablet Take 1 tablet by mouth once daily 90 tablet 0    metoprolol succinate (TOPROL XL) 100 MG extended release tablet Take 1 tablet by mouth daily 90 tablet 1    glucose monitoring kit (FREESTYLE) monitoring kit 1 kit by Does not apply route daily 1 kit 0    aspirin 81 MG tablet Take 162 mg by mouth daily  Chlorpheniramine Maleate (ALLERGY RELIEF PO) Take by mouth as needed      vitamin D 1000 UNITS CAPS Take by mouth daily D3      Blood Glucose Monitoring Suppl (FREESTYLE LITE) KORY       Garlic 850 MG TABS Take  by mouth daily.  Coenzyme Q10 (COQ10) 50 MG CAPS Take  by mouth daily.  Multiple Vitamins-Minerals (THERAPEUTIC MULTIVITAMIN-MINERALS) tablet Take 1 tablet by mouth daily.  Acai Solorio 500 MG CAPS Take 1 tablet by mouth daily.  Calcium Citrate-Vitamin D (CITRACAL + D PO) Take by mouth daily       Omega-3 Fatty Acids (OMEGA 3 PO) Take 1 tablet by mouth daily       FLAXSEED OIL Take 1 tablet by mouth daily        No current facility-administered medications for this visit. Facility-Administered Medications Ordered in Other Visits   Medication Dose Route Frequency Provider Last Rate Last Admin    sodium chloride flush 0.9 % injection 10 mL  10 mL IntraVENous PRN Serena Rios MD        sodium chloride (PF) 0.9 % injection 10 mL  10 mL IntraVENous PRN Eran Tafoya MD             Physical findings:  General- no acute distress, oriented  HEENT - no xanthelasma, external ears normal  Neck- Supple, no thyromegaly  Skin- warm, dry, no skin breakdown or gangrene  Extremities - no edema, wearing compression        Assessment:  Encounter Diagnosis   Name Primary?  Venous insufficiency of both lower extremities Yes     Good result after repeated efforts at venous treatment  Plan of care:  rtc 1 yr  30 min chart review and pt encounter  Follow up and evaluate.        Electronically signed by Rachel Peraza MD on 6/22/22 at 1:02 PM EDT

## 2022-07-06 ENCOUNTER — HOSPITAL ENCOUNTER (OUTPATIENT)
Age: 72
Setting detail: SPECIMEN
Discharge: HOME | End: 2022-07-06

## 2022-07-07 LAB
ESTIMATED AVERAGE GLUCOSE: 166 MG/DL
HBA1C MFR BLD: 7.4 % (ref 4–6)

## 2022-08-04 ENCOUNTER — HOSPITAL ENCOUNTER (OUTPATIENT)
Dept: WOMENS IMAGING | Age: 72
Discharge: HOME | End: 2022-08-06
Payer: MEDICARE

## 2022-08-04 ENCOUNTER — HOSPITAL ENCOUNTER (OUTPATIENT)
Age: 72
Discharge: HOME | End: 2022-08-04
Payer: MEDICARE

## 2022-08-04 ENCOUNTER — HOSPITAL ENCOUNTER (OUTPATIENT)
Dept: ULTRASOUND IMAGING | Age: 72
Discharge: HOME | End: 2022-08-06
Payer: MEDICARE

## 2022-08-04 DIAGNOSIS — E11.21 TYPE 2 DIABETES MELLITUS WITH DIABETIC NEPHROPATHY, WITHOUT LONG-TERM CURRENT USE OF INSULIN (HCC): ICD-10-CM

## 2022-08-04 DIAGNOSIS — R53.83 OTHER FATIGUE: ICD-10-CM

## 2022-08-04 DIAGNOSIS — R93.89 ABNORMAL THYROID ULTRASOUND: ICD-10-CM

## 2022-08-04 DIAGNOSIS — Z12.31 SCREENING MAMMOGRAM FOR HIGH-RISK PATIENT: ICD-10-CM

## 2022-08-04 DIAGNOSIS — M54.2 NECK PAIN: ICD-10-CM

## 2022-08-04 LAB — TSH SERPL DL<=0.05 MIU/L-ACNC: 3.7 UIU/ML (ref 0.3–5)

## 2022-08-04 PROCEDURE — 84443 ASSAY THYROID STIM HORMONE: CPT

## 2022-08-04 PROCEDURE — 83036 HEMOGLOBIN GLYCOSYLATED A1C: CPT

## 2022-08-04 PROCEDURE — 76536 US EXAM OF HEAD AND NECK: CPT

## 2022-08-04 PROCEDURE — 36415 COLL VENOUS BLD VENIPUNCTURE: CPT

## 2022-08-04 PROCEDURE — 84481 FREE ASSAY (FT-3): CPT

## 2022-08-04 PROCEDURE — 77063 BREAST TOMOSYNTHESIS BI: CPT

## 2022-08-04 PROCEDURE — 84439 ASSAY OF FREE THYROXINE: CPT

## 2022-08-05 LAB
ESTIMATED AVERAGE GLUCOSE: 151 MG/DL
HBA1C MFR BLD: 6.9 % (ref 4–6)
T3 FREE: 2.84 PG/ML (ref 2.02–4.43)
THYROXINE, FREE: 1.24 NG/DL (ref 0.93–1.7)

## 2022-08-22 ENCOUNTER — OFFICE VISIT (OUTPATIENT)
Dept: ENT CLINIC | Age: 72
End: 2022-08-22
Payer: MEDICARE

## 2022-08-22 ENCOUNTER — HOSPITAL ENCOUNTER (OUTPATIENT)
Age: 72
Discharge: HOME | End: 2022-08-22
Payer: MEDICARE

## 2022-08-22 VITALS
WEIGHT: 213 LBS | OXYGEN SATURATION: 98 % | RESPIRATION RATE: 18 BRPM | HEART RATE: 83 BPM | BODY MASS INDEX: 30.56 KG/M2

## 2022-08-22 DIAGNOSIS — R29.810 FACIAL WEAKNESS: ICD-10-CM

## 2022-08-22 DIAGNOSIS — R53.83 FATIGUE, UNSPECIFIED TYPE: Primary | ICD-10-CM

## 2022-08-22 DIAGNOSIS — E03.9 ACQUIRED HYPOTHYROIDISM: ICD-10-CM

## 2022-08-22 PROCEDURE — 1123F ACP DISCUSS/DSCN MKR DOCD: CPT | Performed by: OTOLARYNGOLOGY

## 2022-08-22 PROCEDURE — 36415 COLL VENOUS BLD VENIPUNCTURE: CPT

## 2022-08-22 PROCEDURE — G8417 CALC BMI ABV UP PARAM F/U: HCPCS | Performed by: OTOLARYNGOLOGY

## 2022-08-22 PROCEDURE — 3017F COLORECTAL CA SCREEN DOC REV: CPT | Performed by: OTOLARYNGOLOGY

## 2022-08-22 PROCEDURE — 1036F TOBACCO NON-USER: CPT | Performed by: OTOLARYNGOLOGY

## 2022-08-22 PROCEDURE — 83520 IMMUNOASSAY QUANT NOS NONAB: CPT

## 2022-08-22 PROCEDURE — G8399 PT W/DXA RESULTS DOCUMENT: HCPCS | Performed by: OTOLARYNGOLOGY

## 2022-08-22 PROCEDURE — 84432 ASSAY OF THYROGLOBULIN: CPT

## 2022-08-22 PROCEDURE — G8427 DOCREV CUR MEDS BY ELIG CLIN: HCPCS | Performed by: OTOLARYNGOLOGY

## 2022-08-22 PROCEDURE — 86376 MICROSOMAL ANTIBODY EACH: CPT

## 2022-08-22 PROCEDURE — 1090F PRES/ABSN URINE INCON ASSESS: CPT | Performed by: OTOLARYNGOLOGY

## 2022-08-22 PROCEDURE — 99203 OFFICE O/P NEW LOW 30 MIN: CPT | Performed by: OTOLARYNGOLOGY

## 2022-08-22 ASSESSMENT — ENCOUNTER SYMPTOMS
GASTROINTESTINAL NEGATIVE: 1
ALLERGIC/IMMUNOLOGIC NEGATIVE: 1
EYES NEGATIVE: 1
RESPIRATORY NEGATIVE: 1

## 2022-08-22 NOTE — PROGRESS NOTES
[facility]  [address] [facility] [address]  Dept: [phone]  Gabriela Nash MD    Donovan Spotted 80 y.o. female     Patient presents with a chief complaint of Thyroid Problem (Patient in office to have thyroid evaluated. Blood work was normal.)       Pulse 83   Resp 18   Wt 213 lb (96.6 kg)   LMP  (LMP Unknown)   SpO2 98%   BMI 30.56 kg/m²       History of Presenting Illness: The patient/caregiver reports a history of complaint with the following features: Onset: started in last few months  Timing: comes and goes  Duration: throat complaints with col-like symptoms, sore throats  Quality: sore throat, brain fog, palpitations  Location: sore throat  Severity: pain mild  Risk factors: no COVID exposures, no testing  Alleviating factors: nothing gives relief  Aggravating factors: nothing makes it worse  Associated factors: had blooding sinus discharge in April, and then symptoms again in June but less severe, she continues with bothersome fatigue    Review of systems covering 10 systems is reviewed as staff entry in other note and pertinent positives and negatives noted. Past Medical History:   Diagnosis Date    Activity intolerance 06/06/2018    Allergic rhinitis due to other allergen     Depressive disorder, not elsewhere classified     Diabetes ([address]     Diverticulosis     H/O echocardiogram 09/07/2016    EF 55%. LV wall thickness is mildly increased. Left atrium is severely dilated (>40) left atrial volume index of 41 ml/m2. Mild mitral and treicuspid regurg. Moderate pulmonary hypertension estimated right ventricular systolic pressure of 42 mmHg. Moderate diastolic dysfunction is seen.     Hypertension     LAYO (obstructive sleep apnea) 12/14/2015    Other and unspecified hyperlipidemia     Other seborrheic keratosis     Pain in lower limb 06/06/2018    Peripheral vascular disease ([address] Right leg. Had ablation done 6/2014. Polycythemia vera(238.4)     Polycythemia vera(238.4)     Supraventricular tachycardia (HCC)     Type II or unspecified type diabetes mellitus without mention of complication, not stated as uncontrolled     Type II or unspecified type diabetes mellitus without mention of complication, not stated as uncontrolled     Undiagnosed cardiac murmurs     Unspecified essential hypertension     Unspecified hypothyroidism     Varicose veins of left lower extremity with complications 77/02/8427    Varicose veins of left lower extremity with pain 06/06/2018    Venous (peripheral) insufficiency 06/06/2018    Venous insufficiency of both lower extremities 06/06/2018       Current Outpatient Medications:     famotidine (PEPCID) 20 MG tablet, Take 1 tablet by mouth in the morning and 1 tablet before bedtime. , Disp: 180 tablet, Rfl: 1    glimepiride (AMARYL) 2 MG tablet, Take 2 tablets by mouth every morning (before breakfast), Disp: 90 tablet, Rfl: 0    losartan (COZAAR) 100 MG tablet, Take 1 tablet by mouth daily, Disp: 90 tablet, Rfl: 1    rosuvastatin (CRESTOR) 5 MG tablet, Take 1 tablet by mouth nightly, Disp: 90 tablet, Rfl: 1    amLODIPine (NORVASC) 10 MG tablet, Take 1 tablet by mouth once daily, Disp: 90 tablet, Rfl: 0    EUTHYROX 50 MCG tablet, TAKE 1 TABLET BY MOUTH ONCE DAILY IN THE MORNING, Disp: 90 tablet, Rfl: 0    metFORMIN (GLUCOPHAGE) 500 MG tablet, Take 2 tablets by mouth twice daily, Disp: 360 tablet, Rfl: 0    FreeStyle Lancets MISC, USE 1  TO CHECK GLUCOSE ONCE DAILY, Disp: 100 each, Rfl: 0    fluticasone (FLONASE) 50 MCG/ACT nasal spray, 2 sprays by Nasal route daily (Patient taking differently: 2 sprays by Nasal route daily as needed), Disp: 16 g, Rfl: 5    blood glucose test strips (FREESTYLE LITE) strip, TEST BLOOD SUGAR ONCE DAILY AS DIRECTED: FREESTYLE METER DX:E11.9, Disp: 100 each, Rfl: 2    metoprolol succinate (TOPROL XL) 100 MG extended release tablet, Take 1 tablet by mouth daily, Disp: 90 tablet, Rfl: 1    aspirin 81 MG tablet, Take 162 mg by mouth daily, Disp: , Rfl:     Chlorpheniramine Maleate (ALLERGY RELIEF PO), Take by mouth as needed, Disp: , Rfl:     vitamin D 1000 UNITS CAPS, Take by mouth daily D3, Disp: , Rfl:     Blood Glucose Monitoring Suppl (FREESTYLE LITE) KORY, , Disp: , Rfl:     Garlic 152 MG TABS, Take  by mouth daily. , Disp: , Rfl:     Coenzyme Q10 (COQ10) 50 MG CAPS, Take  by mouth daily. , Disp: , Rfl:     Multiple Vitamins-Minerals (THERAPEUTIC MULTIVITAMIN-MINERALS) tablet, Take 1 tablet by mouth daily. , Disp: , Rfl:     Acai Berry 500 MG CAPS, Take 1 tablet by mouth daily. , Disp: , Rfl:     Calcium Citrate-Vitamin D (CITRACAL + D PO), Take by mouth daily, Disp: , Rfl:     Omega-3 Fatty Acids (OMEGA 3 PO), Take 1 tablet by mouth daily , Disp: , Rfl:     FLAXSEED OIL, Take 1 tablet by mouth daily , Disp: , Rfl:     albuterol sulfate HFA (VENTOLIN HFA) 108 (90 Base) MCG/ACT inhaler, Inhale 2 puffs into the lungs 4 times daily as needed for Wheezing (Patient not taking: No sig reported), Disp: 18 g, Rfl: 0    glucose monitoring kit (FREESTYLE) monitoring kit, 1 kit by Does not apply route daily, Disp: 1 kit, Rfl: 0   Allergies   Allergen Reactions    Latex Rash    Seasonal     Tape [Adhesive Tape] Rash      Past Surgical History:   Procedure Laterality Date    BACK SURGERY Left 11/19/2020    BACK LESION EXCISION-LARGE LOWER LIPOMA performed by Avtar Elena DO at [address] [place] [address]  10/10/2016    -diverticulosis,hemorrhoids    EXCISION/BIOPSY Left 11/19/2020    large lipoma    HYSTERECTOMY (CERVIX STATUS UNKNOWN)      2002    LEG SURGERY      left and right laser surgery    OTHER SURGICAL HISTORY Left 06/06/2018    sclerotherapy/Dr [email or web address]    OTHER SURGICAL HISTORY Left 03/20/2019    DR Gomez/[facility]/sclerotherapy with Asclera    OTHER SURGICAL HISTORY  08/13/2021 normal symmetric nasal tip, normal nasal valves    Nasal Mucosa: normal, pink and moist    Septum: not markedly deformed, midline, no exposed vessels, no bleeding, no septal granuloma    Turbinates: normal size and conformation    Nasopharynx: normal    ORAL CAVITY/MOUTH:    Lips, teeth, gums: normal lips, normal gums, dentition intact, no dental pain on palpation    Oral Mucosa: normal, moist, no lesions    Palate: normal hard palate, normal soft palate, symmetric palatal elevation    Floor of Mouth: normal floor of mouth    Tongue: normal tongue, no lesions, no edema, no masses, normal mucosa, mobile    Tonsils: normal tonsils, symmetric, no lesions    Posterior pharynx: normal    NECK:    Neck: no masses, trachea midline, normal range of motion, no cysts or pits, no tenderness to palpation    Thyroid: normal thyroid, no enlargement, no tenderness, no nodules    LYMPH NODES:    Cervical: no palpable lymph node enlargement    RESPIRATORY:    Inspection/Auscultation: good air movement, chest expands symmetrically, normal breath sounds, no wheezing, no stridor    CARDIOVASCULAR SYSTEM:    Auscultation: irregular rate and rhythm with occasional skipped beats, carotid pulse normal, no carotid thrills, no carotid bruits    Observation/Palpation of Peripheral Vascular System: no varicosities, no cyanosis, no edema    SKIN:    General Appearance: no lesions, warm and dry, normal turgor, no bruising    NEUROLOGICAL SYSTEM:    Orientation: oriented to time, oriented to place, oriented to person    Cranial Nerves: Cranial Nerves II-XII intact, normal facial movement    PSYCHIATRIC:    Mood and affect: normal mood, normal affect        Assessment and Plan:    She presents with concerns of thyroid disease to which she attributes fatigue and brain fog. Recent ultrasound is reviewed with no worrisome nodules or masses noted.   Thyroid labs are also normal with TSH in the high normal range consistent with possible Hashimoto's thyroiditis. TPO antibody testing for corroboration is offered. She is already supplemented with thyroid hormone and this may be increased to bring the TSH to 1.0 if her cardiologist is in agreement, but I would defer to them given her cardiac disease history. She has some right facial droop and recounts awakening a few years ago with right arm pain that then subsided around the time she first noticed this. This is suspicious for possible stroke in the past and I have encouraged her to discuss any prophylaxis with her PCP and cardiologist, although no intervention given the distant occurrence of this deficit is indicated at this time. Finally, anyone complaining of brain fog and fatigue with a history of preceding cold-like symptoms is highly suspect for long COVID and this should be considered in light of her complaints. Diagnosis Orders   1. Fatigue, unspecified type        2. Acquired hypothyroidism  Thyroid Peroxidase Antibody    Thyroid Stimulating Receptor Antibody    Thyroglobulin      3. Facial weakness           Return if symptoms worsen or fail to improve. The patient and/or caregiver is to notify the office if no improvement or worsening of symptoms is noted prior to the scheduled follow-up for sooner evaluation. The patient and/or caregiver is able to state an understanding of these recommendations and is agreeable to the treatment plan. --César Masterson MD on 8/22/2022 at 9:51 AM    An electronic signature was used to authenticate this note.

## 2022-08-22 NOTE — PATIENT INSTRUCTIONS
SURVEY:    You may be receiving a survey from [employer] regarding your visit today. Please complete the survey to enable us to provide the highest quality of care to you and your family. If you cannot score us a very good on any question, please call the office to discuss how we could have made your experience a very good one. Thank you.   [id number], MD Vivia Ganser, MD Liane Mariee, MD Bahman Kennedy, MD Tess Cates, MD Mal Quiroga, Yesy Dorsey [place], [place], [place]  Phillip Brennan, [address] [place] [address]

## 2022-08-23 LAB
THYROGLOBULIN: <0.2 NG/ML (ref 0–63.4)
TSH RECEPTOR AB: <0.8 IU/L

## 2022-08-24 LAB — THYROID PEROXIDASE (TPO) AB: <4 IU/ML (ref 0–25)

## 2022-08-25 ENCOUNTER — TELEPHONE (OUTPATIENT)
Dept: ENT CLINIC | Age: 72
End: 2022-08-25

## 2022-08-25 NOTE — TELEPHONE ENCOUNTER
----- Message from Ang Hoyt MD sent at 8/24/2022  4:40 PM EDT -----  Please advise patient that there are no marker to suggest thyroiditis making the ultrasound findings of uncertain significance, but no change in treatment is indicated.

## 2022-09-02 ENCOUNTER — TELEMEDICINE (OUTPATIENT)
Dept: CARDIOLOGY | Age: 72
End: 2022-09-02
Payer: MEDICARE

## 2022-09-02 DIAGNOSIS — I07.1 MILD TRICUSPID REGURGITATION: ICD-10-CM

## 2022-09-02 DIAGNOSIS — R29.810 FACIAL DROOP: ICD-10-CM

## 2022-09-02 DIAGNOSIS — E78.2 MIXED HYPERLIPIDEMIA: ICD-10-CM

## 2022-09-02 DIAGNOSIS — I10 PRIMARY HYPERTENSION: ICD-10-CM

## 2022-09-02 DIAGNOSIS — I34.0 MILD MITRAL REGURGITATION: ICD-10-CM

## 2022-09-02 DIAGNOSIS — E03.9 ACQUIRED HYPOTHYROIDISM: ICD-10-CM

## 2022-09-02 DIAGNOSIS — R00.2 PALPITATIONS: ICD-10-CM

## 2022-09-02 DIAGNOSIS — I50.32 CHRONIC DIASTOLIC CONGESTIVE HEART FAILURE (HCC): Primary | ICD-10-CM

## 2022-09-02 PROCEDURE — 1123F ACP DISCUSS/DSCN MKR DOCD: CPT | Performed by: FAMILY MEDICINE

## 2022-09-02 PROCEDURE — G8399 PT W/DXA RESULTS DOCUMENT: HCPCS | Performed by: FAMILY MEDICINE

## 2022-09-02 PROCEDURE — 1036F TOBACCO NON-USER: CPT | Performed by: FAMILY MEDICINE

## 2022-09-02 PROCEDURE — 3017F COLORECTAL CA SCREEN DOC REV: CPT | Performed by: FAMILY MEDICINE

## 2022-09-02 PROCEDURE — 99214 OFFICE O/P EST MOD 30 MIN: CPT | Performed by: FAMILY MEDICINE

## 2022-09-02 PROCEDURE — 1090F PRES/ABSN URINE INCON ASSESS: CPT | Performed by: FAMILY MEDICINE

## 2022-09-02 PROCEDURE — G8427 DOCREV CUR MEDS BY ELIG CLIN: HCPCS | Performed by: FAMILY MEDICINE

## 2022-09-02 PROCEDURE — G8417 CALC BMI ABV UP PARAM F/U: HCPCS | Performed by: FAMILY MEDICINE

## 2022-09-02 PROCEDURE — 99211 OFF/OP EST MAY X REQ PHY/QHP: CPT | Performed by: FAMILY MEDICINE

## 2022-09-02 RX ORDER — LOSARTAN POTASSIUM AND HYDROCHLOROTHIAZIDE 12.5; 1 MG/1; MG/1
1 TABLET ORAL DAILY
Qty: 90 TABLET | Refills: 3 | Status: SHIPPED | OUTPATIENT
Start: 2022-09-02 | End: 2022-10-06

## 2022-09-02 NOTE — PATIENT INSTRUCTIONS
SURVEY:    You may be receiving a survey from [employer] regarding your visit today. Please complete the survey to enable us to provide the highest quality of care to you and your family. If you cannot score us a very good on any question, please call the office to discuss how we could have made your experience a very good one. Thank you.

## 2022-09-02 NOTE — PROGRESS NOTES
Alejo Higgins am scribing for and in the presence of Jerrica Alejo. Lupe RIGGS, MS, F.A.C.C..    Patient: Bryan Duffy  : 1950  Date of Visit: 2022    REASON FOR VISIT / CONSULTATION: Follow-up (Hx: CHF. Pt is here for HTN. This started a few days ago. )    Dear Nitin Betancourt, APRN - CNP,    I had the pleasure of seeing your patient Bryan Duffy in consultation today. As you know, Ms. Richy Morocho is a 80 y.o. female with a history of polycythemia vera and pulmonary hypertension. Her echocardiogram on 2016 showed an ejection fraction of 55% with moderate pulmonary hypertension and severe left atrial enlargement (>40). Echocardiogram done on 2019 that showed EF of 55%. LV wall thickness is moderately increased. Moderate diastolic dysfunction is seen. Since the last time I saw Ms. Cortes has been doing okay. She is mourning the loss of her . She doesn't sleep well at night. She tries to sleep on her back, she does use a CPAP machine. She does not nap during the day. She does feel her blood pressure has been okay in the 130-140's. She did have one elevated blood pressure of 157 systolic and at that time she did have a quick sharp chest pain. Her tiredness and fatigue is about moderate. The last few days she has had more energy. She thought this was all related her thyroid. Years ago she did have some facial drooping and she had right arm pain. This happened around , but was recently brought up again by Dr Sonu Villaseñor due to observing facial drooping. She denied any current or recent chest pain, pressure or tightness. She denies having any lightheaded/dizziness. She denies having abdominal pain, bleeding problems, bowel issues, problems with her medications or any other concerns at this time. No cough, fever or chills. No nausea or vomiting.         Past Medical History:   Diagnosis Date    Activity intolerance 2018    Allergic rhinitis due to other allergen     Depressive disorder, not elsewhere classified     Diabetes ([facility] [address]     Diverticulosis     H/O echocardiogram 09/07/2016    EF 55%. LV wall thickness is mildly increased. Left atrium is severely dilated (>40) left atrial volume index of 41 ml/m2. Mild mitral and treicuspid regurg. Moderate pulmonary hypertension estimated right ventricular systolic pressure of 42 mmHg. Moderate diastolic dysfunction is seen. Hypertension     LAYO (obstructive sleep apnea) 12/14/2015    Other and unspecified hyperlipidemia     Other seborrheic keratosis     Pain in lower limb 06/06/2018    Peripheral vascular disease (HCC)     Right leg. Had ablation done 6/2014. Polycythemia vera(238.4)     Polycythemia vera(238.4)     Supraventricular tachycardia (HCC)     Type II or unspecified type diabetes mellitus without mention of complication, not stated as uncontrolled     Type II or unspecified type diabetes mellitus without mention of complication, not stated as uncontrolled     Undiagnosed cardiac murmurs     Unspecified essential hypertension     Unspecified hypothyroidism     Varicose veins of left lower extremity with complications 04/90/2843    Varicose veins of left lower extremity with pain 06/06/2018    Venous (peripheral) insufficiency 06/06/2018    Venous insufficiency of both lower extremities 06/06/2018     CURRENT ALLERGIES: Latex, Seasonal, and Tape [adhesive tape] REVIEW OF SYSTEMS: 14 systems were reviewed. Pertinent positives and negatives as above, all else negative.      Past Surgical History:   Procedure Laterality Date    BACK SURGERY Left 11/19/2020    BACK LESION EXCISION-LARGE LOWER LIPOMA performed by Parveen Maravilla DO at [address] [place] [address]  10/10/2016    -diverticulosis,hemorrhoids    EXCISION/BIOPSY Left 11/19/2020    large lipoma    HYSTERECTOMY (CERVIX STATUS UNKNOWN)      2002    LEG SURGERY      left and right laser surgery    OTHER SURGICAL Garlic 570 MG TABS Take  by mouth daily. Coenzyme Q10 (COQ10) 50 MG CAPS Take  by mouth daily. Multiple Vitamins-Minerals (THERAPEUTIC MULTIVITAMIN-MINERALS) tablet Take 1 tablet by mouth daily. Acai Berry 500 MG CAPS Take 1 tablet by mouth daily. Calcium Citrate-Vitamin D (CITRACAL + D PO) Take by mouth daily      Omega-3 Fatty Acids (OMEGA 3 PO) Take 1 tablet by mouth daily       FLAXSEED OIL Take 1 tablet by mouth daily        FAMILY HISTORY: family history includes Cancer in her father; Diabetes in her mother; Hearing Loss in her father; High Blood Pressure in her father and mother; Stroke in her mother. PHYSICAL EXAM:   LMP  (LMP Unknown)  There is no height or weight on file to calculate BMI. Constitutional: She is oriented to person, place, and time. She appears well-developed and well-nourished. In no acute distress. HEENT: Normocephalic and atraumatic. No JVD present. Carotid bruit is not present. No mass and no thyromegaly present. No lymphadenopathy present. Cardiovascular: Normal rate, regular rhythm, normal heart sounds. Exam reveals no gallop and no friction rubs. 1/6 systolic murmur, 5th intercostal space on the LEFT in the mid-clavicular line (cardiac apex). Pulmonary/Chest: Effort normal and breath sounds normal. No respiratory distress. She has no wheezes, rhonchi or rales. Abdominal: Soft, non-tender. Bowel sounds and aorta are normal. She exhibits no organomegaly, mass or bruit. Extremities: Trace at the ankles. No cyanosis or clubbing. 2+ radial and carotid pulses. Distal extremity pulses: 2+ bilaterally. Neurological: She is alert and oriented to person, place, and time. No evidence of gross cranial nerve deficit. Coordination appeared normal.   Skin: Skin is warm and dry. There is no rash or diaphoresis. Psychiatric: She has a normal mood and affect.  Her speech is normal and behavior is normal.      MOST RECENT LABS ON RECORD:   Lab Results   Component Medical Management: Continue Toprol  mg daily. Pulmonary hypertension: Continue aggressive blood pressure control. Beta Blocker: Continue metoprolol succinate (Toprol XL) 100 mg once daily. Calcium Channel Blocker: Continue amlodipine (Norvasc)      ACE Inibitor/ARB: STOP losartan (Cozaar) and START losartan/HCTZ (Hyzaar) 100/12.5 mg every morning. Essential Hypertension with moderate LVH seen on echo and ECG: Borderline Controlled  ACE Inibitor/ARB: STOP losartan (Cozaar) and START losartan/HCTZ (Hyzaar) 100/12.5 mg every morning. I also discussed the potential side effects of this medication including lightheadedness and dizziness and instructed them to stop the medication of this occurs and call our office if this occurs. Calcium Channel Blocker: Continue amlodipine (Norvasc) 10 mg daily. Beta Blocker: Continue metoprolol succinate (Toprol XL) 100 mg once daily. Additional Testing List: I took the liberty of ordering a BMP in 5-7 days to assess their potassium and renal function. I told them that they could get their lab work performed at the location of their choosing, unfortunately, if the lab work was not performed at a [facility]) facility I could not guarantee my ability to follow up with them on their results. History of mild to moderate mitral and tricuspid regurgitation with severe left atrial enlargement >40:   Additional Testing List: I ordered a echocardiogram to better assess for the etiology of this problem and to help guide future management     Hyperlipidemia: Mixed LDL 60 mg/dl on 12/2/2021  Statin Therapy: Continue rosuvastatin (Crestor) 5 mg nightly. Obstructive Sleep Apnea: Currently asymptomatic. Monitor for now. Continue using CPAP nightly     Hypothyroidism:  From a cardiac standpoint I am okay if Dr Carey Bradshaw wants to increase her thyroid medication.      Facial drooping: I did advise her that I can call Dr Abdirahman Downs to see if it is reasonable to order an MRI to rule out an old stroke. If he is agreeable we will get her scheduled for an MRI in the next few weeks. Finally, I recommended that she continue her other medications and follow up with you as previously scheduled. FOLLOW UP:   I told Ms. Cortes to call my office if she had any problems, but otherwise will plan on seeing her again Return in about 2 months (around 11/2/2022). However, I would be happy to see her sooner should the need arise. Once again, thank you for allowing me to participate in this patients care. Please do not hesitate to contact me could I be of further assistance. Sincerely,  Hari Andrade MD, MS, F.A.C.C. [facility] Special[facility]   [facility], [address] [place] [address]  Phone: [phone], Fax: [phone]    I believe that the risk of significant morbidity and mortality related to the patient's current medical conditions are: Intermediate. Approximately 35 minutes were spent during prep work, discussion and exam of the patient, and follow up documentation and all of their questions were answered. The documentation recorded by the scribe, accurately and completely reflects the services I personally performed and the decisions made by me. Filiberto Peterson MD, MS, F.A.C.C.  September 2, 2022

## 2022-09-07 ENCOUNTER — TELEPHONE (OUTPATIENT)
Dept: CARDIOLOGY | Age: 72
End: 2022-09-07

## 2022-09-07 NOTE — TELEPHONE ENCOUNTER
Ok. Lets stop Losartan/HCTZ and restart her lostartan 100 mg daily, put in RX, lets stop her Metoprolol 100 mg and start Labetolol 100 mg bid. Please put in Rx. Thanks.

## 2022-09-07 NOTE — TELEPHONE ENCOUNTER
Pt called and stated lisinopril HCTZ is causing her to be very dizzy. She states she remembered she was on this last year and had the same side effects.  She wants to know what else she can switch to?

## 2022-09-08 RX ORDER — LABETALOL 100 MG/1
100 TABLET, FILM COATED ORAL 2 TIMES DAILY
Qty: 180 TABLET | Refills: 3 | Status: SHIPPED | OUTPATIENT
Start: 2022-09-08 | End: 2022-11-02

## 2022-09-08 RX ORDER — LOSARTAN POTASSIUM 100 MG/1
100 TABLET ORAL DAILY
Qty: 90 TABLET | Refills: 3
Start: 2022-09-08

## 2022-10-03 ENCOUNTER — HOSPITAL ENCOUNTER (OUTPATIENT)
Age: 72
Setting detail: SPECIMEN
Discharge: HOME | End: 2022-10-03

## 2022-10-03 DIAGNOSIS — E03.9 HYPOTHYROIDISM, UNSPECIFIED TYPE: ICD-10-CM

## 2022-10-03 DIAGNOSIS — E11.21 TYPE 2 DIABETES MELLITUS WITH DIABETIC NEPHROPATHY, WITHOUT LONG-TERM CURRENT USE OF INSULIN (HCC): ICD-10-CM

## 2022-10-03 DIAGNOSIS — I27.20 PULMONARY HYPERTENSION (HCC): ICD-10-CM

## 2022-10-03 DIAGNOSIS — D45 POLYCYTHEMIA VERA (HCC): ICD-10-CM

## 2022-10-03 LAB
ABSOLUTE EOS #: 0.26 K/UL (ref 0–0.44)
ABSOLUTE IMMATURE GRANULOCYTE: <0.03 K/UL (ref 0–0.3)
ABSOLUTE LYMPH #: 0.92 K/UL (ref 1.1–3.7)
ABSOLUTE MONO #: 0.51 K/UL (ref 0.1–1.2)
ALBUMIN SERPL-MCNC: 4.2 G/DL (ref 3.5–5.2)
ALBUMIN/GLOBULIN RATIO: 1.7 (ref 1–2.5)
ALP BLD-CCNC: 72 U/L (ref 35–104)
ALT SERPL-CCNC: 18 U/L (ref 5–33)
ANION GAP SERPL CALCULATED.3IONS-SCNC: 11 MMOL/L (ref 9–17)
AST SERPL-CCNC: 16 U/L
BASOPHILS # BLD: 1 % (ref 0–2)
BASOPHILS ABSOLUTE: 0.05 K/UL (ref 0–0.2)
BILIRUB SERPL-MCNC: 0.9 MG/DL (ref 0.3–1.2)
BUN BLDV-MCNC: 16 MG/DL (ref 8–23)
CALCIUM SERPL-MCNC: 9.3 MG/DL (ref 8.6–10.4)
CHLORIDE BLD-SCNC: 104 MMOL/L (ref 98–107)
CHOLESTEROL/HDL RATIO: 2.8
CHOLESTEROL: 137 MG/DL
CO2: 25 MMOL/L (ref 20–31)
CREAT SERPL-MCNC: 0.41 MG/DL (ref 0.5–0.9)
EOSINOPHILS RELATIVE PERCENT: 5 % (ref 1–4)
GFR SERPL CREATININE-BSD FRML MDRD: >60 ML/MIN/1.73M2
GLUCOSE BLD-MCNC: 141 MG/DL (ref 70–99)
HCT VFR BLD CALC: 47 % (ref 36.3–47.1)
HDLC SERPL-MCNC: 49 MG/DL
HEMOGLOBIN: 14.6 G/DL (ref 11.9–15.1)
IMMATURE GRANULOCYTES: 0 %
LDL CHOLESTEROL: 76 MG/DL (ref 0–130)
LYMPHOCYTES # BLD: 17 % (ref 24–43)
MCH RBC QN AUTO: 27.1 PG (ref 25.2–33.5)
MCHC RBC AUTO-ENTMCNC: 31.1 G/DL (ref 28.4–34.8)
MCV RBC AUTO: 87.2 FL (ref 82.6–102.9)
MONOCYTES # BLD: 9 % (ref 3–12)
NRBC AUTOMATED: 0 PER 100 WBC
PDW BLD-RTO: 17.2 % (ref 11.8–14.4)
PLATELET # BLD: 202 K/UL (ref 138–453)
PMV BLD AUTO: 11.5 FL (ref 8.1–13.5)
POTASSIUM SERPL-SCNC: 4.2 MMOL/L (ref 3.7–5.3)
RBC # BLD: 5.39 M/UL (ref 3.95–5.11)
RBC # BLD: ABNORMAL 10*6/UL
SEG NEUTROPHILS: 68 % (ref 36–65)
SEGMENTED NEUTROPHILS ABSOLUTE COUNT: 3.69 K/UL (ref 1.5–8.1)
SODIUM BLD-SCNC: 140 MMOL/L (ref 135–144)
TOTAL PROTEIN: 6.7 G/DL (ref 6.4–8.3)
TRIGL SERPL-MCNC: 60 MG/DL
TSH SERPL DL<=0.05 MIU/L-ACNC: 3.85 UIU/ML (ref 0.3–5)
WBC # BLD: 5.4 K/UL (ref 3.5–11.3)

## 2022-10-05 LAB
ESTIMATED AVERAGE GLUCOSE: 140 MG/DL
HBA1C MFR BLD: 6.5 % (ref 4–6)

## 2022-10-06 ENCOUNTER — HOSPITAL ENCOUNTER (OUTPATIENT)
Dept: INFUSION THERAPY | Age: 72
Discharge: HOME | End: 2022-10-06
Payer: MEDICARE

## 2022-10-06 VITALS
TEMPERATURE: 98.7 F | HEART RATE: 78 BPM | SYSTOLIC BLOOD PRESSURE: 115 MMHG | DIASTOLIC BLOOD PRESSURE: 71 MMHG | RESPIRATION RATE: 18 BRPM

## 2022-10-06 DIAGNOSIS — D45 POLYCYTHEMIA VERA (HCC): Primary | ICD-10-CM

## 2022-10-06 PROCEDURE — 99195 PHLEBOTOMY: CPT

## 2022-10-06 RX ORDER — 0.9 % SODIUM CHLORIDE 0.9 %
500 INTRAVENOUS SOLUTION INTRAVENOUS ONCE
OUTPATIENT
Start: 2022-10-06 | End: 2022-10-06

## 2022-10-06 RX ORDER — 0.9 % SODIUM CHLORIDE 0.9 %
250 INTRAVENOUS SOLUTION INTRAVENOUS ONCE
OUTPATIENT
Start: 2022-10-06 | End: 2022-10-06

## 2022-10-10 ENCOUNTER — HOSPITAL ENCOUNTER (OUTPATIENT)
Dept: NON INVASIVE DIAGNOSTICS | Age: 72
Discharge: HOME | End: 2022-10-10
Payer: MEDICARE

## 2022-10-10 DIAGNOSIS — R00.2 PALPITATIONS: ICD-10-CM

## 2022-10-10 DIAGNOSIS — I07.1 MILD TRICUSPID REGURGITATION: ICD-10-CM

## 2022-10-10 DIAGNOSIS — E03.9 ACQUIRED HYPOTHYROIDISM: ICD-10-CM

## 2022-10-10 DIAGNOSIS — E78.2 MIXED HYPERLIPIDEMIA: ICD-10-CM

## 2022-10-10 DIAGNOSIS — I10 PRIMARY HYPERTENSION: ICD-10-CM

## 2022-10-10 DIAGNOSIS — I34.0 MILD MITRAL REGURGITATION: ICD-10-CM

## 2022-10-10 DIAGNOSIS — I50.32 CHRONIC DIASTOLIC CONGESTIVE HEART FAILURE (HCC): ICD-10-CM

## 2022-10-10 LAB
LV EF: 55 %
LVEF MODALITY: NORMAL

## 2022-10-10 PROCEDURE — 93306 TTE W/DOPPLER COMPLETE: CPT

## 2022-10-10 PROCEDURE — A9500 TC99M SESTAMIBI: HCPCS | Performed by: FAMILY MEDICINE

## 2022-10-10 PROCEDURE — 3430000000 HC RX DIAGNOSTIC RADIOPHARMACEUTICAL: Performed by: FAMILY MEDICINE

## 2022-10-10 PROCEDURE — 93017 CV STRESS TEST TRACING ONLY: CPT

## 2022-10-10 RX ADMIN — Medication 30 MILLICURIE: at 08:55

## 2022-10-11 ENCOUNTER — HOSPITAL ENCOUNTER (OUTPATIENT)
Dept: NON INVASIVE DIAGNOSTICS | Age: 72
Discharge: HOME | End: 2022-10-11
Payer: MEDICARE

## 2022-10-11 PROCEDURE — 3430000000 HC RX DIAGNOSTIC RADIOPHARMACEUTICAL: Performed by: FAMILY MEDICINE

## 2022-10-11 PROCEDURE — A9500 TC99M SESTAMIBI: HCPCS | Performed by: FAMILY MEDICINE

## 2022-10-11 PROCEDURE — 78452 HT MUSCLE IMAGE SPECT MULT: CPT

## 2022-10-11 RX ADMIN — Medication 30 MILLICURIE: at 13:31

## 2022-10-12 ENCOUNTER — TELEPHONE (OUTPATIENT)
Dept: CARDIOLOGY | Age: 72
End: 2022-10-12

## 2022-10-12 ENCOUNTER — OFFICE VISIT (OUTPATIENT)
Dept: ONCOLOGY | Age: 72
End: 2022-10-12
Payer: MEDICARE

## 2022-10-12 VITALS
BODY MASS INDEX: 30.99 KG/M2 | TEMPERATURE: 98.4 F | HEART RATE: 80 BPM | WEIGHT: 216 LBS | DIASTOLIC BLOOD PRESSURE: 72 MMHG | RESPIRATION RATE: 18 BRPM | SYSTOLIC BLOOD PRESSURE: 138 MMHG

## 2022-10-12 DIAGNOSIS — D75.1 POLYCYTHEMIA: Primary | ICD-10-CM

## 2022-10-12 DIAGNOSIS — K57.30 DIVERTICULOSIS OF LARGE INTESTINE WITHOUT HEMORRHAGE: ICD-10-CM

## 2022-10-12 PROCEDURE — 99214 OFFICE O/P EST MOD 30 MIN: CPT | Performed by: INTERNAL MEDICINE

## 2022-10-12 PROCEDURE — G8427 DOCREV CUR MEDS BY ELIG CLIN: HCPCS | Performed by: INTERNAL MEDICINE

## 2022-10-12 PROCEDURE — G8417 CALC BMI ABV UP PARAM F/U: HCPCS | Performed by: INTERNAL MEDICINE

## 2022-10-12 PROCEDURE — G8484 FLU IMMUNIZE NO ADMIN: HCPCS | Performed by: INTERNAL MEDICINE

## 2022-10-12 PROCEDURE — 99211 OFF/OP EST MAY X REQ PHY/QHP: CPT | Performed by: INTERNAL MEDICINE

## 2022-10-12 PROCEDURE — 1036F TOBACCO NON-USER: CPT | Performed by: INTERNAL MEDICINE

## 2022-10-12 PROCEDURE — 3017F COLORECTAL CA SCREEN DOC REV: CPT | Performed by: INTERNAL MEDICINE

## 2022-10-12 PROCEDURE — G8399 PT W/DXA RESULTS DOCUMENT: HCPCS | Performed by: INTERNAL MEDICINE

## 2022-10-12 PROCEDURE — 1090F PRES/ABSN URINE INCON ASSESS: CPT | Performed by: INTERNAL MEDICINE

## 2022-10-12 PROCEDURE — 1123F ACP DISCUSS/DSCN MKR DOCD: CPT | Performed by: INTERNAL MEDICINE

## 2022-10-12 NOTE — TELEPHONE ENCOUNTER
----- Message from Unique Graham MD sent at 10/12/2022  4:21 PM EDT -----  Let Ms. Cortes know their test result was ok. Will discuss at next visit. Thanks.

## 2022-10-12 NOTE — PROCEDURES
[address] [place], [place] [address]                              CARDIAC STRESS TEST    PATIENT NAME: Umer SAUL                  :        1950  MED REC NO:   [id number]                              ROOM:  ACCOUNT NO:   [de-identified]                           ADMIT DATE: 10/10/2022  PROVIDER:     Myriam Nieves MD    CARDIOVASCULAR DIAGNOSTIC DEPARTMENT    DATE OF STUDY:  10/10/2022    ORDERING PROVIDER:  Myriam Nieves MD    PRIMARY CARE PROVIDER:  Jocelyn Naidu CNP    INTERPRETING PHYSICIAN:  Myriam Nieves MD    EXERCISE MYOCARDIAL PERFUSION STRESS TEST REPORT    Stress/rest single-isotope SPECT imaging with exercise stress and gated  SPECT imaging    INDICATION:  Assessment of a cardiac cause of:  Palpitations    CLINICAL HISTORY:  The patient is a 43-year-old woman with no known  coronary artery disease. Previous cardiac history includes:  None    Other previous history includes:  Palpitations, diabetes mellitus, arm  pain, hypertension    Symptoms just prior to testing included:  None    Relevant medications:  Labetalol, amlodipine (Norvasc)    PROCEDURE:  The patient performed treadmill exercise using a Turner  protocol, completing 3:00 minutes and completing an estimated workload  of 7.00 metabolic equivalents (METS). The test was terminated due to fatigue and shortness of breath. The heart rate was 92 beats per minute at baseline and increased to 144  beats per minute at peak exercise, which was 96% of the maximum  predicted heart rate. The rest blood pressure was 132/82 mmHg and  increased to 200/88 mmHg, which is a normal response. During the  procedure, the patient developed fatigue, shortness of breath and leg  fatigue but denied any chest discomfort. Myocardial perfusion imaging: Imaging was performed at rest 30-45  minutes following the injection of 30 mCi of sestamibi.   At peak  exercise, the patient was injected with 30 mCi of sestamibi and exercise  was continued for 1 minute. Gating post-stress tomographic imaging was  performed 30-45 minutes after stress. STRESS ECG RESULTS:  The resting electrocardiogram demonstrated normal  sinus rhythm without definitive ST-segment abnormalities suggestive of  myocardial ischemia. At peak exercise and during recovery, the patient developed:    Horizontal ST segment changes in lead(s) II, III, aVF, V5, V6 which did  not meet diagnostic criteria for myocardial ischemia with occasional  premature atrial contractions (PACs) and no premature ventricular  contractions (PVCs). NUCLEAR IMAGING RESULTS:  The overall quality of the study is good. Mild/moderate attenuation artifact was seen. There is no evidence of  abnormal lung uptake. Additionally, the right ventricle appears normal.  The left ventricular cavity is noted to be normal in size on the stress  images. There is no evidence of transient ischemic dilatation (TID) of  the left ventricle. Gated SPECT imaging reveals normal myocardial thickening and wall motion  with a calculated left ventricular ejection fraction of 63%. The rest images demonstrated a small perfusion abnormality of  mild/moderate intensity in the anteroseptal region(s) which is most  likely due to artifact. On stress imaging, a small/moderate perfusion abnormality of mild  intensity was noted in the lateral and inferolateral region(s) which may  be due to artifact. IMPRESSION:  1. Equivocal myocardial perfusion study. There is a small/moderate  perfusion defect of mild intensity in the lateral and inferolateral  regions during stress imaging, which is most consistent with ischemia  but may be due to artifact. 2.  Global left ventricular systolic function was normal without  regional wall motion abnormalities.     3.  No significant electrocardiographic evidence of myocardial ischemia  during EKG monitoring without significant associated arrhythmias. The patient's Duke Treadmill score is 2, which correlates with an  intermediate risk for significant coronary artery disease. Overall, these results are most consistent with a low/intermediate risk  for significant coronary artery disease. Depending on the patient symptoms and level of clinical suspicion,  aggressive medical management vs. additional testing by coronary  angiography may be indicated. The sensitivity for detecting ischemia on this test may have been  reduced due to the patient being on a beta blocker and calcium channel  blocker.                  Alfred Oneill MD    D: 10/12/2022 11:32:49       T: 10/12/2022 11:34:33     ARGENTINA/MALIK_EDIT  Job#: [id number]     Doc#: Unknown    CC:  MATT Valdez-ISAIAS

## 2022-10-13 ENCOUNTER — TELEPHONE (OUTPATIENT)
Dept: CARDIOLOGY | Age: 72
End: 2022-10-13

## 2022-10-13 DIAGNOSIS — D75.1 POLYCYTHEMIA: Primary | ICD-10-CM

## 2022-10-13 NOTE — TELEPHONE ENCOUNTER
----- Message from Camila Capps MD sent at 10/13/2022 12:21 AM EDT -----  Let Patient know stress test a bit abnormal but Will discuss at next visit. Thanks.

## 2022-10-16 NOTE — PROGRESS NOTES
Reason for the visit:   Chief Complaint   Patient presents with    Follow-up       Pertinent Clinical Problems/ Treatments:  Polycythemia, initially diagnosed with polycythemia vera, but further workup suggested other possibilities,  Diagnosed with pulmonary hypertension, possibly explaining her symptoms and elevated hematocrit  Treated with periodic phlebotomies and aspirin as the phlebotomies helped her symptoms    Summary of the case/HPI :    She is 80-year-old patient who was diagnosed in 2004 with polycythemia vera, she was symptomatically the time in terms of headache and fatigue. She has been treated with therapeutic phlebotomies as well as aspirin since that time. Patient did not have any major events like thrombosis or stroke and did not qualify for cytoreductive therapy like hydroxyurea  She has been doing relatively well with the phlebotomy every couple of months to keep hematocrit is than 45  Further workup showed negative Perlita Done 2 mutation and normal erythropoietin. Pulmonary workup suggested pulmonary hypertension. We organized sleep study and she didn't do it yet to exclude obstructive sleep apnea although this is suspected clinically. The patient phlebotomies helped significantly so we arranged for periodic phlebotomies when her hematocrit is above 45. Interim HX:    Patient is here today for follow-up visit for polycythemia. Patient has been doing phlebotomy every 4 months. She is clinically stable. She feels better after the phlebotomy with less headaches. No dizziness. No TIA or CVA-like symptoms. Patient had no cardiac events. At the present time she feels fine with no symptoms.        Past Medical History   has a past medical history of Activity intolerance, Allergic rhinitis due to other allergen, Depressive disorder, not elsewhere classified, Diabetes ([address] Diverticulosis, H/O echocardiogram, Hypertension, LAYO (obstructive sleep apnea), Other and unspecified fever or chills. No night sweats, no weight loss   Eyes: No eye discharge, double vision, or eye pain   HEENT: negative for sore mouth, sore throat, hoarseness and voice change   Respiratory: negative for cough , sputum, dyspnea, wheezing, hemoptysis, chest pain   Cardiovascular: negative for chest pain, dyspnea, palpitations, orthopnea, PND   Gastrointestinal: negative for nausea, vomiting, diarrhea, constipation, abdominal pain, Dysphagia, hematemesis and hematochezia   Genitourinary: negative for frequency, dysuria, nocturia, urinary incontinence, and hematuria   Integument: negative for rash, skin lesions, bruises.    Hematologic/Lymphatic: negative for easy bruising, bleeding, lymphadenopathy, petechiae and swelling/edema   Endocrine: negative for heat or cold intolerance, tremor, weight changes, change in bowel habits and hair loss   Musculoskeletal: negative for myalgias, arthralgias, pain, joint swelling,and bone pain   Neurological: negative for headaches, dizziness, seizures, weakness, numbness      Physical Examination :     /72   Pulse 80   Temp 98.4 °F (36.9 °C) (Temporal)   Resp 18   Wt 216 lb (98 kg)   LMP  (LMP Unknown)   BMI 30.99 kg/m²     Performance Status:Estimated performance status is ECOG 0    General appearance - well appearing, not in pain or distress   Mental status - alert and cooperative   Eyes - pupils equal and reactive, extraocular eye movements intact   Ears - bilateral TM's and external ear canals normal   Mouth - mucous membranes moist, pharynx normal without lesions   Neck - supple, no significant adenopathy ,trach is central   Lymphatics - no palpable lymphadenopathy, no hepatosplenomegaly   Chest - clear to auscultation, no wheezes, rales or rhonchi, symmetric air entry   Heart - normal rate, regular rhythm, normal S1, S2, no murmurs,  Abdomen - soft, nontender, nondistended, no masses or organomegaly   Neurological - alert, oriented, normal speech, no focal findings or movement disorder noted   Musculoskeletal - no joint tenderness, deformity or swelling   Extremities - peripheral pulses normal, no pedal edema, no clubbing or cyanosis   Skin - normal coloration and turgor, no rashes, no suspicious skin lesions noted          Lab Results   Component Value Date    WBC 5.4 10/03/2022    HGB 14.6 10/03/2022    HCT 47.0 10/03/2022    MCV 87.2 10/03/2022     10/03/2022       Chemistry        Component Value Date/Time     10/03/2022 0900    K 4.2 10/03/2022 0900     10/03/2022 0900    CO2 25 10/03/2022 0900    BUN 16 10/03/2022 0900    CREATININE 0.41 (L) 10/03/2022 0900        Component Value Date/Time    CALCIUM 9.3 10/03/2022 0900    ALKPHOS 72 10/03/2022 0900    AST 16 10/03/2022 0900    ALT 18 10/03/2022 0900    BILITOT 0.9 10/03/2022 0900    BILITOT NEGATIVE 05/06/2019 0801        DARRIUS 2 is negative  erythropoietin level is 14   Previous records were reviewed, her erythropoietin was never suppressed. Assessment:    Polycythemia, the patient diagnoses of polycythemia vera is in question. Darrius 2 mutation was negative. I think it's more likely related to pulmonary hypertension possible sleep apnea   The patient is managed with periodic phlebotomies to help with her symptoms   Type 2 diabetes, hypertension, under fair control        Plan:     Lab tests done outside facility were reviewed and discussed. Patient feels better after therapeutic phlebotomy. Labs are showing hemoglobin maintained at the upper limit of normal.  So my recommendation is to continue with the same schedule with phlebotomy every 4 months. I'm not convinced that she has polycythemia vera. But she is insisting on the phlebotomies because the to help her symptoms with headache and fatigue   We will keep hematocrit around 45  she continues to have follow-up with pulmonary and having CPAP. Continue full dose aspirin  We will see her back in 1 year for a follow-up.   CBC and phlebotomy will be done every 4 months. Patient will be seen sooner if she develops any problems.                               [address] [facility]

## 2022-11-02 ENCOUNTER — OFFICE VISIT (OUTPATIENT)
Dept: CARDIOLOGY | Age: 72
End: 2022-11-02
Payer: MEDICARE

## 2022-11-02 VITALS
HEART RATE: 77 BPM | WEIGHT: 214 LBS | BODY MASS INDEX: 30.64 KG/M2 | HEIGHT: 70 IN | DIASTOLIC BLOOD PRESSURE: 86 MMHG | SYSTOLIC BLOOD PRESSURE: 181 MMHG | OXYGEN SATURATION: 94 % | RESPIRATION RATE: 18 BRPM

## 2022-11-02 DIAGNOSIS — Z99.89 OSA ON CPAP: ICD-10-CM

## 2022-11-02 DIAGNOSIS — I27.20 PULMONARY HYPERTENSION (HCC): ICD-10-CM

## 2022-11-02 DIAGNOSIS — I10 PRIMARY HYPERTENSION: ICD-10-CM

## 2022-11-02 DIAGNOSIS — G47.33 OSA ON CPAP: ICD-10-CM

## 2022-11-02 DIAGNOSIS — R94.39 ABNORMAL CARDIOVASCULAR STRESS TEST: Primary | ICD-10-CM

## 2022-11-02 DIAGNOSIS — I34.0 MILD MITRAL REGURGITATION: ICD-10-CM

## 2022-11-02 DIAGNOSIS — I51.7 MODERATE LEFT VENTRICULAR HYPERTROPHY: ICD-10-CM

## 2022-11-02 DIAGNOSIS — I50.32 CHRONIC DIASTOLIC CONGESTIVE HEART FAILURE (HCC): ICD-10-CM

## 2022-11-02 DIAGNOSIS — R42 LIGHT HEADED: ICD-10-CM

## 2022-11-02 PROCEDURE — 93010 ELECTROCARDIOGRAM REPORT: CPT | Performed by: FAMILY MEDICINE

## 2022-11-02 PROCEDURE — 1036F TOBACCO NON-USER: CPT | Performed by: FAMILY MEDICINE

## 2022-11-02 PROCEDURE — G8427 DOCREV CUR MEDS BY ELIG CLIN: HCPCS | Performed by: FAMILY MEDICINE

## 2022-11-02 PROCEDURE — G8484 FLU IMMUNIZE NO ADMIN: HCPCS | Performed by: FAMILY MEDICINE

## 2022-11-02 PROCEDURE — 3078F DIAST BP <80 MM HG: CPT | Performed by: FAMILY MEDICINE

## 2022-11-02 PROCEDURE — G8417 CALC BMI ABV UP PARAM F/U: HCPCS | Performed by: FAMILY MEDICINE

## 2022-11-02 PROCEDURE — 1090F PRES/ABSN URINE INCON ASSESS: CPT | Performed by: FAMILY MEDICINE

## 2022-11-02 PROCEDURE — 3074F SYST BP LT 130 MM HG: CPT | Performed by: FAMILY MEDICINE

## 2022-11-02 PROCEDURE — 3017F COLORECTAL CA SCREEN DOC REV: CPT | Performed by: FAMILY MEDICINE

## 2022-11-02 PROCEDURE — 99215 OFFICE O/P EST HI 40 MIN: CPT | Performed by: FAMILY MEDICINE

## 2022-11-02 PROCEDURE — G8399 PT W/DXA RESULTS DOCUMENT: HCPCS | Performed by: FAMILY MEDICINE

## 2022-11-02 PROCEDURE — 1123F ACP DISCUSS/DSCN MKR DOCD: CPT | Performed by: FAMILY MEDICINE

## 2022-11-02 PROCEDURE — 93005 ELECTROCARDIOGRAM TRACING: CPT | Performed by: FAMILY MEDICINE

## 2022-11-02 RX ORDER — LABETALOL 200 MG/1
200 TABLET, FILM COATED ORAL 2 TIMES DAILY
Qty: 180 TABLET | Refills: 3 | Status: SHIPPED | OUTPATIENT
Start: 2022-11-02

## 2022-11-02 NOTE — PATIENT INSTRUCTIONS
SURVEY:    You may be receiving a survey from [employer] regarding your visit today. Please complete the survey to enable us to provide the highest quality of care to you and your family. If you cannot score us a very good on any question, please call the office to discuss how we could have made your experience a very good one. Thank you.

## 2022-11-02 NOTE — PROGRESS NOTES
Stacey Marte am scribing for and in the presence of Simin Michel. Lupe RIGGS, MS, F.A.C.C..    Patient: Racquel Hendricks  : 1950  Date of Visit: 2022    REASON FOR VISIT / CONSULTATION: Follow-up (HX: CHF, palps, HTN, HLD, hypothyroid, mild mitral tricuspid regurg. Pt is here for a yearly follow up. Pt says she is doing well. Some light headed dizziness she thinks due to medication but it doesn't last long. Pt denies CP, palpitations, SOB. )      Dear Kody Tamez, APRN - CNP,    I had the pleasure of seeing your patient Racquel Hendricks in consultation today. As you know, Ms. Tamika Carranza is a 71 y.o. female with a history of polycythemia vera and pulmonary hypertension. Her echocardiogram on 2016 showed an ejection fraction of 55% with moderate pulmonary hypertension and severe left atrial enlargement (>40). Echocardiogram done on 2019 that showed EF of 55%. LV wall thickness is moderately increased. Moderate diastolic dysfunction is seen. Ms. Tamika Carranza is here today for a yearly follow up. She says she is doing well. She says she could walk a block if she wanted too but her legs would stop her. She has some leg issues and back pain. She denies any palpitations. She has some rare dizziness that this thinks may be due to her medications. She denies any chest pain or pressure. Denies any shortness of breath. She typically wears compression stockings to help with her swelling and notes increased swelling in her legs when she does not wear them. She continues to use her CPAP machine at night. She denies having abdominal pain, bleeding problems, bowel issues, problems with her medications or any other concerns at this time. No cough, fever or chills. No nausea or vomiting.       Past Medical History:   Diagnosis Date    Activity intolerance 2018    Allergic rhinitis due to other allergen     Depressive disorder, not elsewhere classified     Diabetes ([facility] [address]     Diverticulosis H/O echocardiogram 09/07/2016    EF 55%. LV wall thickness is mildly increased. Left atrium is severely dilated (>40) left atrial volume index of 41 ml/m2. Mild mitral and treicuspid regurg. Moderate pulmonary hypertension estimated right ventricular systolic pressure of 42 mmHg. Moderate diastolic dysfunction is seen. Hypertension     LAYO (obstructive sleep apnea) 12/14/2015    Other and unspecified hyperlipidemia     Other seborrheic keratosis     Pain in lower limb 06/06/2018    Peripheral vascular disease (HCC)     Right leg. Had ablation done 6/2014. Polycythemia vera(238.4)     Polycythemia vera(238.4)     Supraventricular tachycardia (HCC)     Type II or unspecified type diabetes mellitus without mention of complication, not stated as uncontrolled     Type II or unspecified type diabetes mellitus without mention of complication, not stated as uncontrolled     Undiagnosed cardiac murmurs     Unspecified essential hypertension     Unspecified hypothyroidism     Varicose veins of left lower extremity with complications 52/39/9679    Varicose veins of left lower extremity with pain 06/06/2018    Venous (peripheral) insufficiency 06/06/2018    Venous insufficiency of both lower extremities 06/06/2018       CURRENT ALLERGIES: Latex, Seasonal, and Tape [adhesive tape] REVIEW OF SYSTEMS: 14 systems were reviewed. Pertinent positives and negatives as above, all else negative.      Past Surgical History:   Procedure Laterality Date    BACK SURGERY Left 11/19/2020    BACK LESION EXCISION-LARGE LOWER LIPOMA performed by Son Lewis DO at [address] [place] [address]  10/10/2016    -diverticulosis,hemorrhoids    EXCISION/BIOPSY Left 11/19/2020    large lipoma    HYSTERECTOMY (CERVIX STATUS UNKNOWN)      2002    LEG SURGERY      left and right laser surgery    OTHER SURGICAL HISTORY Left 06/06/2018    sclerotherapy/Dr Nguyen/[facility]    OTHER SURGICAL HISTORY Left 03/20/2019    DR Gomez/[facility]/sclerotherapy with Asclera    OTHER SURGICAL HISTORY  08/13/2021    Venaseal [facility]-Dr. Maggie Moreno    Social History:  Social History     Tobacco Use    Smoking status: Never    Smokeless tobacco: Never   Vaping Use    Vaping Use: Never used   Substance Use Topics    Alcohol use: No    Drug use: Never        CURRENT MEDICATIONS:  Outpatient Medications Marked as Taking for the 11/2/22 encounter (Office Visit) with Ezio Carrillo MD   Medication Sig Dispense Refill    glimepiride (AMARYL) 2 MG tablet Take 2 tablets by mouth every morning (before breakfast) 90 tablet 1    metFORMIN (GLUCOPHAGE) 500 MG tablet Take 2 tablets by mouth twice daily 360 tablet 1    amLODIPine (NORVASC) 10 MG tablet Take 1 tablet by mouth once daily 90 tablet 0    labetalol (NORMODYNE) 100 MG tablet Take 1 tablet by mouth 2 times daily 180 tablet 3    losartan (COZAAR) 100 MG tablet Take 1 tablet by mouth daily 90 tablet 3    EUTHYROX 50 MCG tablet TAKE 1 TABLET BY MOUTH ONCE DAILY IN THE MORNING 90 tablet 0    FreeStyle Lancets MISC USE 1  TO CHECK GLUCOSE ONCE DAILY 100 each 0    rosuvastatin (CRESTOR) 5 MG tablet Take 1 tablet by mouth nightly 90 tablet 1    fluticasone (FLONASE) 50 MCG/ACT nasal spray 2 sprays by Nasal route daily (Patient taking differently: 2 sprays by Nasal route daily as needed) 16 g 5    blood glucose test strips (FREESTYLE LITE) strip TEST BLOOD SUGAR ONCE DAILY AS DIRECTED: FREESTYLE METER DX:E11.9 100 each 2    aspirin 81 MG tablet Take 162 mg by mouth daily      Chlorpheniramine Maleate (ALLERGY RELIEF PO) Take by mouth as needed      vitamin D 1000 UNITS CAPS Take by mouth daily D3      Blood Glucose Monitoring Suppl (FREESTYLE LITE) KORY       Garlic 107 MG TABS Take  by mouth daily. Coenzyme Q10 (COQ10) 50 MG CAPS Take  by mouth daily. Multiple Vitamins-Minerals (THERAPEUTIC MULTIVITAMIN-MINERALS) tablet Take 1 tablet by mouth daily.       Acai Berry 500 MG CAPS Take 1 tablet by mouth daily. Omega-3 Fatty Acids (OMEGA 3 PO) Take 1 tablet by mouth daily       FLAXSEED OIL Take 1 tablet by mouth daily          FAMILY HISTORY: family history includes Cancer in her father; Diabetes in her mother; Hearing Loss in her father; High Blood Pressure in her father and mother; Stroke in her mother. PHYSICAL EXAM:   BP (!) 181/86 (Site: Right Upper Arm, Position: Sitting, Cuff Size: Medium Adult)   Pulse 77   Resp 18   Ht 5' 10\" (1.778 m)   Wt 214 lb (97.1 kg)   LMP  (LMP Unknown)   SpO2 94%   BMI 30.71 kg/m²  Body mass index is 30.71 kg/m². Constitutional: She is oriented to person, place, and time. She appears well-developed and well-nourished. In no acute distress. HEENT: Normocephalic and atraumatic. No JVD present. Carotid bruit is not present. No mass and no thyromegaly present. No lymphadenopathy present. Cardiovascular: Normal rate, regular rhythm, normal heart sounds. Exam reveals no gallop and no friction rubs. 2/6 systolic murmur, 2nd intercostal space on the LEFT just lateral to the sternum. Pulmonary/Chest: Effort normal and breath sounds normal. No respiratory distress. She has no wheezes, rhonchi or rales. Abdominal: Soft, non-tender. Bowel sounds and aorta are normal. She exhibits no organomegaly, mass or bruit. Extremities: No edema. No cyanosis or clubbing. 2+ radial and carotid pulses. Distal extremity pulses: 2+ bilaterally. Neurological: She is alert and oriented to person, place, and time. No evidence of gross cranial nerve deficit. Coordination appeared normal.   Skin: Skin is warm and dry. There is no rash or diaphoresis. Psychiatric: She has a normal mood and affect.  Her speech is normal and behavior is normal.      MOST RECENT LABS ON RECORD:   Lab Results   Component Value Date    WBC 5.4 10/03/2022    HGB 14.6 10/03/2022    HCT 47.0 10/03/2022     10/03/2022    CHOL 137 10/03/2022    TRIG 60 10/03/2022    HDL 49 10/03/2022 ALT 18 10/03/2022    AST 16 10/03/2022     10/03/2022    K 4.2 10/03/2022     10/03/2022    CREATININE 0.41 (L) 10/03/2022    BUN 16 10/03/2022    CO2 25 10/03/2022    TSH 3.85 10/03/2022    LABA1C 6.5 (H) 10/03/2022    LABMICR Can not be calculated 12/02/2021       ASSESSMENT:  1. Abnormal cardiovascular stress test    2. Chronic diastolic congestive heart failure ([address]    3. Light headed    4. Primary hypertension    5. Pulmonary hypertension ([address]    6. Mild mitral regurgitation    7. LAYO on CPAP    8. Moderate left ventricular hypertrophy      PLAN:    Abnormal Stress Test:   For these reasons, I recommended that the patient consider undergoing a cardiac catheterization with coronary angiography to assess their coronary anatomy and facilitate better treatment recommendations. I discussed the risks, benefits, and alternatives to the procedure including the risk of bleeding, contrast induced allergy and/or kidney damage, arrythmia, stroke, heart attack, death, or the need for further procedures. I also discussed the fact that although treatment with simple medical management is a potential treatment option in place of cardiac catheterization, I expressed my opinion that cardiac catheterization in order to define their coronary anatomy and rule out severe 3 vessel or left main coronary artery disease would significant help guide the most appropriate treatment strategy ranging from no treatment, to medications, stents, to even coronary bypass surgery. The patient verbalized understanding of the risks and benefits and indicated that they would prefer to avoid the procedure and instead pursue a medical management approach which I think is an acceptable option. Risk factors: dyslipidemia, obesity, sedentary life style, hypertension     Medical Management for possible Coronary artery disease and myocardial ischemia:  Antiplatelet Agent: Not indicated at this time.    Beta Blocker: INCREASE to labetalol (Normodyne) 200 mg twice daily. I also discussed the potential side effects of this medication including lightheadedness and dizziness and instructed them to stop the medication of this occurs and call our office if this occurs. Anti-anginal medications: Not indicated at this time. Cholesterol Reduction Therapy: Continue rosuvastatin (Crestor) 5 mg daily. Additional Testing List: None    Chronic diastolic heart failure: New York Heart Association Class: IIa (Mild symptoms only in normal activities) Stable but a bit more lower extremity edema that last visit, Right>left possibly related to her recent Venoseal therapy with Dr. Carlos Salgado Blocker: INCREASE to labetalol (Normodyne) 200 mg twice daily. I also discussed the potential side effects of this medication including lightheadedness and dizziness and instructed them to stop the medication of this occurs and call our office if this occurs. ACE Inibitor/ARB: Continue losartan (Cozaar) 100 mg daily. Diuretics: Not indicated at this time. Heart failure counseling: I told them to start wearing lower extremity compression stockings and I advised them to try and keep their legs up whenever possible and to limit salt in their diet. Lightheadedness/dizziness: Resolved since last visit. Pharmacologic Therapy: Not indicated at this time. Nonpharmacologic counseling: Because of her condition, I reminded her to try and keep herself well-hydrated and to take extra time when moving from laying to sitting, sitting to standing and standing to walking. I also explained to her to help improve her symptoms she should include 3 g sodium diet, 1 or 2 L of sports drinks daily, knee-high compressions stockings. Intermittent Palpitations: Mostly with caffeine intake and related to increased stress, Currently well controlled and only mildly bothersome  Non-Pharmacological Management: I advised Ms. Cortes to continue to try and avoid caffeine including dark chocolate as this is known to increase arrhythmias but at this point no additional testing or treatment is likely indicated. Medical Management: INCREASE Labetalol to 200 mg BID    Pulmonary hypertension: Continue aggressive blood pressure control. Beta Blocker: Increase labetolol 200 mg twice daily. Calcium Channel Blocker: Continue amlodipine (Norvasc)      ACE Inibitor/ARB: Continue losartan (Cozaar) 100 mg once daily      Essential Hypertension with moderate LVH seen on echo and ECG: Borderline Controlled  ACE Inibitor/ARB: Continue losartan (Cozaar)100 mg tablet once daily  Calcium Channel Blocker: Continue amlodipine (Norvasc) 10 mg daily. Beta Blocker: Increase labetolol 100 mg twice daily. History of mild to moderate mitral and tricuspid regurgitation with severe left atrial enlargement >40:   Additional Testing List: None    Hyperlipidemia: Mixed  Statin Therapy: Continue rosuvastatin (Crestor) 5 mg nightly. She has a repeat lipid panel ordered per MATT Tabor CNP for this November    Obstructive Sleep Apnea: Currently asymptomatic. Monitor for now. Continue using CPAP nightly     Finally, I recommended that she continue her other medications and follow up with you as previously scheduled. FOLLOW UP:   I told Ms. Cortes to call my office if she had any problems, but otherwise will plan on seeing her again No follow-ups on file. However, I would be happy to see her sooner should the need arise. Once again, thank you for allowing me to participate in this patients care. Please do not hesitate to contact me could I be of further assistance. Sincerely,  Aida Louis. Lupe RIGGS, MS, F.A.C.C. [facility]   Place  [facility], [address] [place] [address]  Phone: [phone], Fax: [phone]    I believe that the risk of significant morbidity and mortality related to the patient's current medical conditions are: Intermediate.  >30 minutes were spent during prep work, discussion and exam of the patient, and follow up documentation and all of their questions were answered. The documentation recorded by the scribe, accurately and completely reflects the services I personally performed and the decisions made by me. Hailey Colón MD, MS, F.A.C.C.  November 2, 2022

## 2022-11-07 ENCOUNTER — OFFICE VISIT (OUTPATIENT)
Dept: PULMONOLOGY | Age: 72
End: 2022-11-07
Payer: MEDICARE

## 2022-11-07 VITALS
HEIGHT: 70 IN | RESPIRATION RATE: 16 BRPM | SYSTOLIC BLOOD PRESSURE: 139 MMHG | TEMPERATURE: 77 F | HEART RATE: 80 BPM | OXYGEN SATURATION: 97 % | WEIGHT: 214.8 LBS | BODY MASS INDEX: 30.75 KG/M2 | DIASTOLIC BLOOD PRESSURE: 76 MMHG

## 2022-11-07 DIAGNOSIS — I82.412 DVT FEMORAL (DEEP VENOUS THROMBOSIS) WITH THROMBOPHLEBITIS, LEFT (HCC): ICD-10-CM

## 2022-11-07 DIAGNOSIS — D45 POLYCYTHEMIA VERA (HCC): ICD-10-CM

## 2022-11-07 DIAGNOSIS — I27.20 PULMONARY HYPERTENSION (HCC): ICD-10-CM

## 2022-11-07 DIAGNOSIS — I50.32 CHRONIC DIASTOLIC (CONGESTIVE) HEART FAILURE (HCC): ICD-10-CM

## 2022-11-07 DIAGNOSIS — R09.82 POST-NASAL DRIP: ICD-10-CM

## 2022-11-07 DIAGNOSIS — E66.09 OBESITY DUE TO EXCESS CALORIES, UNSPECIFIED OBESITY SEVERITY: ICD-10-CM

## 2022-11-07 DIAGNOSIS — G47.33 OSA (OBSTRUCTIVE SLEEP APNEA): Primary | ICD-10-CM

## 2022-11-07 PROCEDURE — 99214 OFFICE O/P EST MOD 30 MIN: CPT

## 2022-11-07 PROCEDURE — 3078F DIAST BP <80 MM HG: CPT | Performed by: INTERNAL MEDICINE

## 2022-11-07 PROCEDURE — 3017F COLORECTAL CA SCREEN DOC REV: CPT | Performed by: INTERNAL MEDICINE

## 2022-11-07 PROCEDURE — 3074F SYST BP LT 130 MM HG: CPT | Performed by: INTERNAL MEDICINE

## 2022-11-07 PROCEDURE — 1123F ACP DISCUSS/DSCN MKR DOCD: CPT | Performed by: INTERNAL MEDICINE

## 2022-11-07 PROCEDURE — G8484 FLU IMMUNIZE NO ADMIN: HCPCS | Performed by: INTERNAL MEDICINE

## 2022-11-07 PROCEDURE — G8427 DOCREV CUR MEDS BY ELIG CLIN: HCPCS | Performed by: INTERNAL MEDICINE

## 2022-11-07 PROCEDURE — G8417 CALC BMI ABV UP PARAM F/U: HCPCS | Performed by: INTERNAL MEDICINE

## 2022-11-07 PROCEDURE — 1090F PRES/ABSN URINE INCON ASSESS: CPT | Performed by: INTERNAL MEDICINE

## 2022-11-07 PROCEDURE — G8399 PT W/DXA RESULTS DOCUMENT: HCPCS | Performed by: INTERNAL MEDICINE

## 2022-11-07 PROCEDURE — 1036F TOBACCO NON-USER: CPT | Performed by: INTERNAL MEDICINE

## 2022-11-07 PROCEDURE — 99214 OFFICE O/P EST MOD 30 MIN: CPT | Performed by: INTERNAL MEDICINE

## 2022-11-07 RX ORDER — FLUTICASONE PROPIONATE 50 MCG
2 SPRAY, SUSPENSION (ML) NASAL DAILY
Qty: 16 G | Refills: 5 | Status: SHIPPED | OUTPATIENT
Start: 2022-11-07

## 2022-11-07 NOTE — PROGRESS NOTES
PULMONARY OP progress note        REFERRED BY: MATT Ocasio CNP    REASON FOR CONSULTATION:  Chronic cough, pulmonary hypertension and sleep apnea    Patient is being seen in follow-up for-  1. LAYO (obstructive sleep apnea)    2. Post-nasal drip    3. Obesity due to excess calories, unspecified obesity severity    4. DVT femoral (deep venous thrombosis) with thrombophlebitis, left (HCC)    5. Pulmonary hypertension ([address]    6. Chronic diastolic (congestive) heart failure (HCC)    7. Polycythemia vera ([address]          HISTORY OF PRESENT ILLNESS:    Denied any hospitalization or ER visit for any breathing issues since last evaluated by me  Denied any hoarseness of the voice  Denied any shortness of breath or wheezing. No hemoptysis. Has been using her auto CPAP machine with improvement in the daytime sleepiness and fatigue and tiredness  Her compliance has improved significantly  Despite her good compliance she does have some tiredness and sleepiness in the daytime  She thinks her machine may not be working well  Has some postnasal discharge  No leg pain  No hemoptysis  No presyncope or syncope  No palpitations  No nasal discharge      PAST MEDICAL HISTORY:       Diagnosis Date    Activity intolerance 06/06/2018    Allergic rhinitis due to other allergen     Depressive disorder, not elsewhere classified     Diabetes ([address]     Diverticulosis     H/O echocardiogram 09/07/2016    EF 55%. LV wall thickness is mildly increased. Left atrium is severely dilated (>40) left atrial volume index of 41 ml/m2. Mild mitral and treicuspid regurg. Moderate pulmonary hypertension estimated right ventricular systolic pressure of 42 mmHg. Moderate diastolic dysfunction is seen. Hypertension     LAYO (obstructive sleep apnea) 12/14/2015    Other and unspecified hyperlipidemia     Other seborrheic keratosis     Pain in lower limb 06/06/2018    Peripheral vascular disease (HCC)     Right leg.  Had ablation done 6/2014.     Polycythemia vera(238.4)     Polycythemia vera(238.4)     Supraventricular tachycardia (HCC)     Type II or unspecified type diabetes mellitus without mention of complication, not stated as uncontrolled     Type II or unspecified type diabetes mellitus without mention of complication, not stated as uncontrolled     Undiagnosed cardiac murmurs     Unspecified essential hypertension     Unspecified hypothyroidism     Varicose veins of left lower extremity with complications 56/87/9858    Varicose veins of left lower extremity with pain 06/06/2018    Venous (peripheral) insufficiency 06/06/2018    Venous insufficiency of both lower extremities 06/06/2018       SURGICAL HISTORY:    Past Surgical History:   Procedure Laterality Date    BACK SURGERY Left 11/19/2020    BACK LESION EXCISION-LARGE LOWER LIPOMA performed by Melissa Trejo DO at [address]  10/10/2016    -diverticulosis,hemorrhoids    EXCISION/BIOPSY Left 11/19/2020    large lipoma    HYSTERECTOMY (CERVIX STATUS UNKNOWN)      2002    LEG SURGERY      left and right laser surgery    OTHER SURGICAL HISTORY Left 06/06/2018    [facility]    OTHER SURGICAL HISTORY Left 03/20/2019    DR Gomez/[facility]/sclerotherapy with Asclera    OTHER SURGICAL HISTORY  08/13/2021    Venaseal [facility]-Dr. Sahu Other       ALLERGIES:    Allergies   Allergen Reactions    Latex Rash    Seasonal     Tape Candida Lizeth Tape] Rash       MEDICATIONS:   Current Outpatient Medications   Medication Sig Dispense Refill    labetalol (NORMODYNE) 200 MG tablet Take 1 tablet by mouth 2 times daily 180 tablet 3    glimepiride (AMARYL) 2 MG tablet Take 2 tablets by mouth every morning (before breakfast) 90 tablet 1    metFORMIN (GLUCOPHAGE) 500 MG tablet Take 2 tablets by mouth twice daily 360 tablet 1    amLODIPine (NORVASC) 10 MG tablet Take 1 tablet by mouth once daily 90 tablet 0    losartan (COZAAR) 100 MG tablet Take 1 tablet by mouth daily 90 tablet 3    EUTHYROX 50 MCG tablet TAKE 1 TABLET BY MOUTH ONCE DAILY IN THE MORNING 90 tablet 0    rosuvastatin (CRESTOR) 5 MG tablet Take 1 tablet by mouth nightly 90 tablet 1    fluticasone (FLONASE) 50 MCG/ACT nasal spray 2 sprays by Nasal route daily (Patient taking differently: 2 sprays by Nasal route daily as needed) 16 g 5    aspirin 81 MG tablet Take 162 mg by mouth daily      Chlorpheniramine Maleate (ALLERGY RELIEF PO) Take by mouth as needed      vitamin D 1000 UNITS CAPS Take by mouth daily D3      Garlic 709 MG TABS Take  by mouth daily. Coenzyme Q10 (COQ10) 50 MG CAPS Take  by mouth daily. Multiple Vitamins-Minerals (THERAPEUTIC MULTIVITAMIN-MINERALS) tablet Take 1 tablet by mouth daily. Acai Berry 500 MG CAPS Take 1 tablet by mouth daily. Omega-3 Fatty Acids (OMEGA 3 PO) Take 1 tablet by mouth daily       FLAXSEED OIL Take 1 tablet by mouth daily       FreeStyle Lancets MISC USE 1  TO CHECK GLUCOSE ONCE DAILY 100 each 0    famotidine (PEPCID) 20 MG tablet Take 1 tablet by mouth in the morning and 1 tablet before bedtime. (Patient not taking: Reported on 11/7/2022) 180 tablet 1    albuterol sulfate HFA (VENTOLIN HFA) 108 (90 Base) MCG/ACT inhaler Inhale 2 puffs into the lungs 4 times daily as needed for Wheezing (Patient not taking: No sig reported) 18 g 0    blood glucose test strips (FREESTYLE LITE) strip TEST BLOOD SUGAR ONCE DAILY AS DIRECTED: FREESTYLE METER DX:E11.9 100 each 2    glucose monitoring kit (FREESTYLE) monitoring kit 1 kit by Does not apply route daily 1 kit 0    Blood Glucose Monitoring Suppl (FREESTYLE LITE) KORY       Calcium Citrate-Vitamin D (CITRACAL + D PO) Take by mouth daily (Patient not taking: No sig reported)       No current facility-administered medications for this visit.      Facility-Administered Medications Ordered in Other Visits   Medication Dose Route Frequency Provider Last Rate Last Admin sodium chloride flush 0.9 % injection 10 mL  10 mL IntraVENous PRN Serena Blue MD        sodium chloride (PF) 0.9 % injection 10 mL  10 mL IntraVENous PRN Fabiola Irwin MD           FAMILY HISTORY: family history includes Cancer in her father; Diabetes in her mother; Hearing Loss in her father; High Blood Pressure in her father and mother; Stroke in her mother. SOCIAL AND OCCUPATIONAL HEALTH:  The patient is a Never smoker. There  is not history of TB or TB exposure. There is not asbestos or silica dust exposure. The patient reports does not have coal, foundry, quarry or [employer] exposure. Travel history reveals no significant history of risk factors for pulm disease. There is not  history of recreational or IV drug use. The patient does not pets, dogs, cats turtles or exotic birds.         Review of Systems:  Review of Systems -   General ROS: Completed and except as mentioned above were negative   Psychological ROS:  Completed and except as mentioned above were negative  Ophthalmic ROS:  Completed and except as mentioned above were negative  ENT ROS:  Completed and except as mentioned above were negative  Allergy and Immunology ROS:  Completed and except as mentioned above were negative  Hematological and Lymphatic ROS:  Completed and except as mentioned above were negative  Endocrine ROS: Completed and except as mentioned above were negative  Breast ROS:  Completed and except as mentioned above were negative  Respiratory ROS:  Completed and except as mentioned above were negative  Cardiovascular ROS:  Completed and except as mentioned above were negative  Gastrointestinal ROS: Completed and except as mentioned above were negative  Genito-Urinary ROS:  Completed and except as mentioned above were negative  Musculoskeletal ROS:  Completed and except as mentioned above were negative  Neurological ROS:  Completed and except as mentioned above were negative  Dermatological ROS:  Completed and except as mentioned above were negative    SLEEP  No epistaxis or sore throat. Patient has been using her CPAP machine with improvement in the daytime sleepiness and fatigue and tiredness  No MVA. No edema. PHYSICAL EXAMINATION:  Vitals:    11/07/22 0927 11/07/22 0930   BP: (!) 153/77 139/76   Pulse: 80    Resp: 16    Temp: 97.8 °F (36.6 °C) (!) 77 °F (25 °C)   SpO2: 97%    Weight: 214 lb 12.8 oz (97.4 kg)    Height: 5' 10\" (1.778 m)      PHYSICAL EXAMINATION:  Vitals:    11/07/22 0927 11/07/22 0930   BP: (!) 153/77 139/76   Pulse: 80    Resp: 16    Temp: 97.8 °F (36.6 °C) (!) 77 °F (25 °C)   SpO2: 97%    Weight: 214 lb 12.8 oz (97.4 kg)    Height: 5' 10\" (1.778 m)      Constitutional: This is a well developed, well nourished, 30-34.9 - Obesity Grade I 71y.o. year old female who is alert, oriented, cooperative and in no apparent distress. Head:normocephalic and atraumatic. EENT:  CARLOS. No conjunctival injections. Septum was midline, mucosa was without erythema, exudates or cobblestoning. No thrush was noted. MallampatiIII (soft palate, base of uvula visible)  Neck: Supple without thyromegaly. No elevated JVP. Trachea was midline. Respiratory: Chest was symmetrical without dullness to percussion. Breath sounds bilaterally were clear to auscultation. There were no wheezes, rhonchi or rales. There is no intercostal retraction or use of accessory muscles. No egophony noted. Cardiovascular: Regular without murmur, clicks, gallops or rubs. Abdomen: Slightly rounded and soft without organomegaly. No rebound, rigidity or guarding was appreciated. Lymphatic: No lymphadenopathy. Musculoskeletal: Normal curvature of the spine. No gross muscle weakness. Extremities: Has bilateral lower extremity edema and patient did not wear stockings, no ulcerations, tenderness, varicosities or erythema. Muscle size, tone and strength are normal.  No involuntary movements are noted. Skin:  Warm and dry. Good color, turgor and pigmentation. No lesions or scars. No cyanosis or clubbing  Neurological/Psychiatric: The patient's general behavior, level of consciousness, thought content and emotional status is normal.          DATA:     Ventilation/perfusion scan was a very low probability for pulmonary embolism  Chest x-ray done simultaneously did not show any acute process  Echocardiogram showed grade 2 diastolic dysfunction without pulmonary artery hypertension  Polysomnogram showed evidence of mild sleep apnea with an AHI of 7 without any leg movements  Her last hematocrit was 43.5    Her compliance was 87 % for usage of more than 4 hours with auto CPAP machine with pressure range of 5 to 15 cm of water with a maximum pressure of 15 cm of water with an AHI of 8    Nocturnal recording oximetry on 8/13/2020 did not show any significant desaturation during sleep with CPAP therapy    Her compliance data showed 77 % compliance for usage of more than 4 hours with an auto CPAP machine of 5 to 15 cm of water with an AHI of 18.1 with a maximum CPAP pressure of 15 cm of water    IMPRESSION:   1. LAYO (obstructive sleep apnea)    2. Post-nasal drip    3. Obesity due to excess calories, unspecified obesity severity    4. DVT femoral (deep venous thrombosis) with thrombophlebitis, left (HCC)    5. Pulmonary hypertension ([address]    6. Chronic diastolic (congestive) heart failure (HCC)    7. Polycythemia vera ([address]                   PLAN:       Reviewed the CPAP compliance information  Refills were provided-Flonase, new auto CPAP machine with pressure range of 5 to 15 cm of water with full facemask and supplies  Educated and clarified the medication use.   Continue using Flonase at least once a day 2 squirts each nostril  She knows how to use the Flonase  Recommended exercising with a gradual increase in the capability  Continue using allergy medications  Jennifer Mcdonald received counseling on the following healthy behaviors: nutrition, exercise and medication adherence  Recommend flu vaccination in the fall annually. She had flu vaccination for the season  Recommendations given regarding pneumococcal vaccinations. Had the COVID-19 vaccination  Recommended COVID-19 vaccination booster, omicron  Patient is up-to-date with vaccinations from pulmonary perspective. Maintain an active lifestyle. All the questions that the patient and the family has had were answered to their satisfaction. Home O2 evaluation to be done. Supplemental oxygen was not needed  As the patient was a never smoker, she would not need any screening for lung cancer  Auto CPAP machine with a range of 5 to 15 cm of water to be used every night for at least 4 hours  Weight loss was recommended and discussed. Recommended following good sleep hygiene instructions. Explained importance of compliance with treatment of sleep apnea. Pt is not to drive if sleepy. We'll see the patient back in 6 months or earlier if needed. Patient will call us if she is sick, so she can be seen sooner. Thank you for having us involved in the care of your patient. Please call us if you have any questions or concerns.               Jacqui Deal MD MD  11/7/2022 9:35 AM

## 2022-11-07 NOTE — PATIENT INSTRUCTIONS
SURVEY:    You may be receiving a survey from [employer] regarding your visit today. Please complete the survey to enable us to provide the highest quality of care to you and your family. If you cannot score us a very good on any question, please call the office to discuss how we could have made your experience a very good one. Thank you. Please recheck your blood pressure when you go home and make sure you take your prescribed medication if applicable . Please follow up with your PCP or ER if needed.

## 2023-02-02 ENCOUNTER — HOSPITAL ENCOUNTER (OUTPATIENT)
Age: 73
Setting detail: SPECIMEN
Discharge: HOME | End: 2023-02-02

## 2023-02-02 DIAGNOSIS — D75.1 POLYCYTHEMIA: ICD-10-CM

## 2023-02-02 LAB
ABSOLUTE EOS #: 0.13 K/UL (ref 0–0.44)
ABSOLUTE IMMATURE GRANULOCYTE: <0.03 K/UL (ref 0–0.3)
ABSOLUTE LYMPH #: 1 K/UL (ref 1.1–3.7)
ABSOLUTE MONO #: 0.36 K/UL (ref 0.1–1.2)
BASOPHILS # BLD: 1 % (ref 0–2)
BASOPHILS ABSOLUTE: 0.05 K/UL (ref 0–0.2)
EOSINOPHILS RELATIVE PERCENT: 3 % (ref 1–4)
HCT VFR BLD AUTO: 45.3 % (ref 36.3–47.1)
HGB BLD-MCNC: 14 G/DL (ref 11.9–15.1)
IMMATURE GRANULOCYTES: 0 %
LYMPHOCYTES # BLD: 24 % (ref 24–43)
MCH RBC QN AUTO: 28.7 PG (ref 25.2–33.5)
MCHC RBC AUTO-ENTMCNC: 30.9 G/DL (ref 28.4–34.8)
MCV RBC AUTO: 93 FL (ref 82.6–102.9)
MONOCYTES # BLD: 9 % (ref 3–12)
NRBC AUTOMATED: 0 PER 100 WBC
PDW BLD-RTO: 15.2 % (ref 11.8–14.4)
PLATELET # BLD AUTO: 175 K/UL (ref 138–453)
PMV BLD AUTO: 11.4 FL (ref 8.1–13.5)
RBC # BLD: 4.87 M/UL (ref 3.95–5.11)
RBC # BLD: ABNORMAL 10*6/UL
SEG NEUTROPHILS: 63 % (ref 36–65)
SEGMENTED NEUTROPHILS ABSOLUTE COUNT: 2.61 K/UL (ref 1.5–8.1)
WBC # BLD AUTO: 4.2 K/UL (ref 3.5–11.3)

## 2023-02-06 ENCOUNTER — HOSPITAL ENCOUNTER (OUTPATIENT)
Dept: INFUSION THERAPY | Age: 73
Discharge: HOME | End: 2023-02-06
Payer: MEDICARE

## 2023-02-06 VITALS — HEART RATE: 70 BPM | RESPIRATION RATE: 16 BRPM | SYSTOLIC BLOOD PRESSURE: 124 MMHG | DIASTOLIC BLOOD PRESSURE: 68 MMHG

## 2023-02-06 DIAGNOSIS — D45 POLYCYTHEMIA VERA (HCC): Primary | ICD-10-CM

## 2023-02-06 PROCEDURE — 99195 PHLEBOTOMY: CPT

## 2023-02-06 RX ORDER — 0.9 % SODIUM CHLORIDE 0.9 %
500 INTRAVENOUS SOLUTION INTRAVENOUS ONCE
OUTPATIENT
Start: 2023-02-06 | End: 2023-02-06

## 2023-02-06 RX ORDER — 0.9 % SODIUM CHLORIDE 0.9 %
250 INTRAVENOUS SOLUTION INTRAVENOUS ONCE
OUTPATIENT
Start: 2023-02-06 | End: 2023-02-06

## 2023-02-06 NOTE — PROGRESS NOTES
1330 Right AC started with 16 gauge closed bag system. Immediate blood return noted. Pt drinking water. 80 Pt voiced no c/o, drinking water. 1339. 500ml phlebotomy completed. Pt deines c/o. VSS. 1350 Pt drinking water, voices no c/o dizziness, or lightheadedness. Pt discharged ambulatory.

## 2023-03-30 ENCOUNTER — HOSPITAL ENCOUNTER (OUTPATIENT)
Age: 73
Setting detail: SPECIMEN
Discharge: HOME | End: 2023-03-30

## 2023-03-30 DIAGNOSIS — D75.1 POLYCYTHEMIA: ICD-10-CM

## 2023-03-30 LAB
ABSOLUTE EOS #: 0.14 K/UL (ref 0–0.44)
ABSOLUTE IMMATURE GRANULOCYTE: <0.03 K/UL (ref 0–0.3)
ABSOLUTE LYMPH #: 1.11 K/UL (ref 1.1–3.7)
ABSOLUTE MONO #: 0.42 K/UL (ref 0.1–1.2)
BASOPHILS # BLD: 1 % (ref 0–2)
BASOPHILS ABSOLUTE: 0.05 K/UL (ref 0–0.2)
EOSINOPHILS RELATIVE PERCENT: 3 % (ref 1–4)
HCT VFR BLD AUTO: 45.4 % (ref 36.3–47.1)
HGB BLD-MCNC: 14.1 G/DL (ref 11.9–15.1)
IMMATURE GRANULOCYTES: 0 %
LYMPHOCYTES # BLD: 24 % (ref 24–43)
MCH RBC QN AUTO: 29 PG (ref 25.2–33.5)
MCHC RBC AUTO-ENTMCNC: 31.1 G/DL (ref 28.4–34.8)
MCV RBC AUTO: 93.2 FL (ref 82.6–102.9)
MONOCYTES # BLD: 9 % (ref 3–12)
NRBC AUTOMATED: 0 PER 100 WBC
PDW BLD-RTO: 14.6 % (ref 11.8–14.4)
PLATELET # BLD AUTO: 177 K/UL (ref 138–453)
PMV BLD AUTO: 11.5 FL (ref 8.1–13.5)
RBC # BLD: 4.87 M/UL (ref 3.95–5.11)
RBC # BLD: ABNORMAL 10*6/UL
SEG NEUTROPHILS: 63 % (ref 36–65)
SEGMENTED NEUTROPHILS ABSOLUTE COUNT: 2.88 K/UL (ref 1.5–8.1)
WBC # BLD AUTO: 4.6 K/UL (ref 3.5–11.3)

## 2023-04-04 ENCOUNTER — HOSPITAL ENCOUNTER (OUTPATIENT)
Age: 73
Setting detail: SPECIMEN
Discharge: HOME | End: 2023-04-04

## 2023-04-04 DIAGNOSIS — E11.9 TYPE 2 DIABETES MELLITUS WITHOUT COMPLICATION, WITHOUT LONG-TERM CURRENT USE OF INSULIN (HCC): ICD-10-CM

## 2023-04-04 LAB
ALBUMIN SERPL-MCNC: 4.2 G/DL (ref 3.5–5.2)
ALBUMIN/GLOBULIN RATIO: 2.1 (ref 1–2.5)
ALP SERPL-CCNC: 68 U/L (ref 35–104)
ALT SERPL-CCNC: 21 U/L (ref 5–33)
ANION GAP SERPL CALCULATED.3IONS-SCNC: 13 MMOL/L (ref 9–17)
AST SERPL-CCNC: 17 U/L
BILIRUB SERPL-MCNC: 0.8 MG/DL (ref 0.3–1.2)
BUN SERPL-MCNC: 23 MG/DL (ref 8–23)
CALCIUM SERPL-MCNC: 10 MG/DL (ref 8.6–10.4)
CHLORIDE SERPL-SCNC: 107 MMOL/L (ref 98–107)
CO2 SERPL-SCNC: 23 MMOL/L (ref 20–31)
CREAT SERPL-MCNC: 0.49 MG/DL (ref 0.5–0.9)
CREATININE URINE: 93.7 MG/DL (ref 28–217)
EST. AVERAGE GLUCOSE BLD GHB EST-MCNC: 128 MG/DL
GFR SERPL CREATININE-BSD FRML MDRD: >60 ML/MIN/1.73M2
GLUCOSE SERPL-MCNC: 132 MG/DL (ref 70–99)
HBA1C MFR BLD: 6.1 % (ref 4–6)
MICROALBUMIN/CREAT 24H UR: 22 MG/L
MICROALBUMIN/CREAT UR-RTO: 23 MCG/MG CREAT
POTASSIUM SERPL-SCNC: 4 MMOL/L (ref 3.7–5.3)
PROT SERPL-MCNC: 6.2 G/DL (ref 6.4–8.3)
SODIUM SERPL-SCNC: 143 MMOL/L (ref 135–144)

## 2023-05-09 ENCOUNTER — HOSPITAL ENCOUNTER (OUTPATIENT)
Age: 73
Setting detail: SPECIMEN
Discharge: HOME | End: 2023-05-09

## 2023-05-09 ENCOUNTER — OFFICE VISIT (OUTPATIENT)
Dept: OBGYN | Age: 73
End: 2023-05-09
Payer: MEDICARE

## 2023-05-09 VITALS
DIASTOLIC BLOOD PRESSURE: 74 MMHG | HEIGHT: 70 IN | SYSTOLIC BLOOD PRESSURE: 128 MMHG | BODY MASS INDEX: 31.21 KG/M2 | WEIGHT: 218 LBS

## 2023-05-09 DIAGNOSIS — N99.3 VAGINAL VAULT PROLAPSE AFTER HYSTERECTOMY: ICD-10-CM

## 2023-05-09 DIAGNOSIS — R39.15 URINARY URGENCY: ICD-10-CM

## 2023-05-09 DIAGNOSIS — D75.1 POLYCYTHEMIA: ICD-10-CM

## 2023-05-09 DIAGNOSIS — N81.11 CYSTOCELE, MIDLINE: Primary | ICD-10-CM

## 2023-05-09 LAB
ABSOLUTE EOS #: 0.13 K/UL (ref 0–0.44)
ABSOLUTE IMMATURE GRANULOCYTE: 0.03 K/UL (ref 0–0.3)
ABSOLUTE LYMPH #: 0.91 K/UL (ref 1.1–3.7)
ABSOLUTE MONO #: 0.49 K/UL (ref 0.1–1.2)
BASOPHILS # BLD: 1 % (ref 0–2)
BASOPHILS ABSOLUTE: 0.06 K/UL (ref 0–0.2)
EOSINOPHILS RELATIVE PERCENT: 2 % (ref 1–4)
HCT VFR BLD AUTO: 45.4 % (ref 36.3–47.1)
HGB BLD-MCNC: 14.3 G/DL (ref 11.9–15.1)
IMMATURE GRANULOCYTES: 1 %
LYMPHOCYTES # BLD: 16 % (ref 24–43)
MCH RBC QN AUTO: 29.2 PG (ref 25.2–33.5)
MCHC RBC AUTO-ENTMCNC: 31.5 G/DL (ref 28.4–34.8)
MCV RBC AUTO: 92.8 FL (ref 82.6–102.9)
MONOCYTES # BLD: 9 % (ref 3–12)
NRBC AUTOMATED: 0 PER 100 WBC
PDW BLD-RTO: 14.2 % (ref 11.8–14.4)
PLATELET # BLD AUTO: 183 K/UL (ref 138–453)
PMV BLD AUTO: 11.5 FL (ref 8.1–13.5)
RBC # BLD: 4.89 M/UL (ref 3.95–5.11)
SEG NEUTROPHILS: 71 % (ref 36–65)
SEGMENTED NEUTROPHILS ABSOLUTE COUNT: 4.03 K/UL (ref 1.5–8.1)
WBC # BLD AUTO: 5.7 K/UL (ref 3.5–11.3)

## 2023-05-09 PROCEDURE — G8399 PT W/DXA RESULTS DOCUMENT: HCPCS | Performed by: OBSTETRICS & GYNECOLOGY

## 2023-05-09 PROCEDURE — G8417 CALC BMI ABV UP PARAM F/U: HCPCS | Performed by: OBSTETRICS & GYNECOLOGY

## 2023-05-09 PROCEDURE — 3017F COLORECTAL CA SCREEN DOC REV: CPT | Performed by: OBSTETRICS & GYNECOLOGY

## 2023-05-09 PROCEDURE — 1090F PRES/ABSN URINE INCON ASSESS: CPT | Performed by: OBSTETRICS & GYNECOLOGY

## 2023-05-09 PROCEDURE — 99203 OFFICE O/P NEW LOW 30 MIN: CPT | Performed by: OBSTETRICS & GYNECOLOGY

## 2023-05-09 PROCEDURE — 1123F ACP DISCUSS/DSCN MKR DOCD: CPT | Performed by: OBSTETRICS & GYNECOLOGY

## 2023-05-09 PROCEDURE — 3078F DIAST BP <80 MM HG: CPT | Performed by: OBSTETRICS & GYNECOLOGY

## 2023-05-09 PROCEDURE — 1036F TOBACCO NON-USER: CPT | Performed by: OBSTETRICS & GYNECOLOGY

## 2023-05-09 PROCEDURE — 3074F SYST BP LT 130 MM HG: CPT | Performed by: OBSTETRICS & GYNECOLOGY

## 2023-05-09 PROCEDURE — G8427 DOCREV CUR MEDS BY ELIG CLIN: HCPCS | Performed by: OBSTETRICS & GYNECOLOGY

## 2023-05-09 RX ORDER — FLUTICASONE PROPIONATE 50 MCG
SPRAY, SUSPENSION (ML) NASAL
COMMUNITY
Start: 2023-04-25

## 2023-05-09 RX ORDER — OXYBUTYNIN CHLORIDE 10 MG/1
10 TABLET, EXTENDED RELEASE ORAL DAILY
Qty: 30 TABLET | Refills: 3 | Status: SHIPPED | OUTPATIENT
Start: 2023-05-09

## 2023-05-09 NOTE — PROGRESS NOTES
DATE OF VISIT:  5/9/23    PATIENT NAME:  Raffi Lopez     YOB: 1950    REASON FOR VISIT:    Chief Complaint   Patient presents with    Bladder Problem     Patient complains of vaginal bulge and frequent urination with bladder pressure. She states that this has been present for years and worsened recently. HISTORY OF PRESENT ILLNESS:  Pt states that she has had a vaginal bulge/prolapse for years; states that recently it seems to have worsened; also complains of frequent urination and urgency; disc'd trial of mediation for urge sx's; disc'd ant repair with need to support apex due to vaginal vault prolapse; disc'd pessary if pt desired non surgical intervention      No LMP recorded (lmp unknown). Patient has had a hysterectomy. Vitals:    05/09/23 0843   BP: 128/74   Weight: 218 lb (98.9 kg)   Height: 5' 10\" (1.778 m)     Body mass index is 31.28 kg/m².   Allergies   Allergen Reactions    Latex Rash    Seasonal     Tape [Adhesive Tape] Rash     Current Outpatient Medications   Medication Sig Dispense Refill    losartan (COZAAR) 100 MG tablet Take 1 tablet by mouth once daily 90 tablet 0    famotidine (PEPCID) 20 MG tablet Take 1 tablet by mouth 2 times daily 180 tablet 1    glimepiride (AMARYL) 2 MG tablet Take 1 tablet by mouth with breakfast and with evening meal 90 tablet 0    metFORMIN (GLUCOPHAGE) 500 MG tablet Take 1 tablet by mouth 2 times daily (with meals) 180 tablet 1    amLODIPine (NORVASC) 10 MG tablet Take 1 tablet by mouth once daily 90 tablet 0    FreeStyle Lancets MISC USE 1  TO CHECK GLUCOSE ONCE DAILY 100 each 0    levothyroxine (SYNTHROID) 50 MCG tablet Take 1 tablet by mouth once daily 90 tablet 0    rosuvastatin (CRESTOR) 5 MG tablet Take 1 tablet by mouth nightly 90 tablet 0    FREESTYLE LITE strip USE 1 STRIP TO CHECK GLUCOSE ONCE DAILY AS DIRECTED 100 each 0    labetalol (NORMODYNE) 200 MG tablet Take 1 tablet by mouth 2 times daily 180 tablet 3    albuterol

## 2023-05-15 ENCOUNTER — OFFICE VISIT (OUTPATIENT)
Dept: CARDIOLOGY | Age: 73
End: 2023-05-15
Payer: MEDICARE

## 2023-05-15 VITALS
RESPIRATION RATE: 18 BRPM | BODY MASS INDEX: 30.49 KG/M2 | SYSTOLIC BLOOD PRESSURE: 139 MMHG | HEIGHT: 70 IN | HEART RATE: 71 BPM | WEIGHT: 213 LBS | DIASTOLIC BLOOD PRESSURE: 73 MMHG | OXYGEN SATURATION: 97 %

## 2023-05-15 DIAGNOSIS — I50.32 CHRONIC DIASTOLIC CONGESTIVE HEART FAILURE (HCC): ICD-10-CM

## 2023-05-15 DIAGNOSIS — E78.2 MIXED HYPERLIPIDEMIA: ICD-10-CM

## 2023-05-15 DIAGNOSIS — I27.20 PULMONARY HTN (HCC): ICD-10-CM

## 2023-05-15 DIAGNOSIS — Z01.810 PREOP CARDIOVASCULAR EXAM: ICD-10-CM

## 2023-05-15 DIAGNOSIS — I10 ESSENTIAL HYPERTENSION: ICD-10-CM

## 2023-05-15 DIAGNOSIS — Z99.89 OSA ON CPAP: ICD-10-CM

## 2023-05-15 DIAGNOSIS — G47.33 OSA ON CPAP: ICD-10-CM

## 2023-05-15 DIAGNOSIS — I34.0 MODERATE MITRAL REGURGITATION: ICD-10-CM

## 2023-05-15 DIAGNOSIS — R94.39 ABNORMAL CARDIOVASCULAR STRESS TEST: Primary | ICD-10-CM

## 2023-05-15 PROCEDURE — 3017F COLORECTAL CA SCREEN DOC REV: CPT | Performed by: FAMILY MEDICINE

## 2023-05-15 PROCEDURE — 99211 OFF/OP EST MAY X REQ PHY/QHP: CPT | Performed by: FAMILY MEDICINE

## 2023-05-15 PROCEDURE — 1090F PRES/ABSN URINE INCON ASSESS: CPT | Performed by: FAMILY MEDICINE

## 2023-05-15 PROCEDURE — G8399 PT W/DXA RESULTS DOCUMENT: HCPCS | Performed by: FAMILY MEDICINE

## 2023-05-15 PROCEDURE — G8417 CALC BMI ABV UP PARAM F/U: HCPCS | Performed by: FAMILY MEDICINE

## 2023-05-15 PROCEDURE — 3075F SYST BP GE 130 - 139MM HG: CPT | Performed by: FAMILY MEDICINE

## 2023-05-15 PROCEDURE — 1036F TOBACCO NON-USER: CPT | Performed by: FAMILY MEDICINE

## 2023-05-15 PROCEDURE — G8427 DOCREV CUR MEDS BY ELIG CLIN: HCPCS | Performed by: FAMILY MEDICINE

## 2023-05-15 PROCEDURE — 99215 OFFICE O/P EST HI 40 MIN: CPT | Performed by: FAMILY MEDICINE

## 2023-05-15 PROCEDURE — 3078F DIAST BP <80 MM HG: CPT | Performed by: FAMILY MEDICINE

## 2023-05-15 PROCEDURE — 1123F ACP DISCUSS/DSCN MKR DOCD: CPT | Performed by: FAMILY MEDICINE

## 2023-05-15 NOTE — PROGRESS NOTES
medical management is a potential treatment option in place of cardiac catheterization, I expressed my opinion that cardiac catheterization in order to define their coronary anatomy and rule out severe 3 vessel or left main coronary artery disease would significant help guide the most appropriate treatment strategy ranging from no treatment, to medications, stents, to even coronary bypass surgery. The patient verbalized understanding of the risks benefits and alternatives and stated that they would like to undergo the procedure. We will plan to schedule the procedure here at [facility] on 5/18/2023. I took the liberty of ordering a BMP for today to assess their potassium and renal function. I told them that they could get their lab work performed at the location of their choosing, unfortunately, if the lab work was not performed at a [facility] facility I could not guarantee my ability to follow up with them on their results. and  I took the liberty of ordering a CBC. I told them that they could get their lab work performed at the location of their choosing, unfortunately, if the lab work was not performed at a [facility] facility I could not guarantee my ability to follow up with them on their results. Medical Management for suspected Coronary artery disease and myocardial ischemia:  Antiplatelet Agent: Continue Aspirin 81 mg daily. Beta Blocker: Continue labetalol (Normodyne) 200 mg twice daily. Calcium Channel Blocker: Continue amlodipine (Norvasc) 10 mg once daily. Cholesterol Reduction Therapy: Continue rosuvastatin (Crestor) 5 mg daily.       Pre-Op Clearance:   Pre-Operative Risk assessment using 2014 ACC/AHA guidelines   Emergent procedure No  Active Cardiac Condition No (decompensated HF, Arrhythmia, MI <3 weeks, severe valve disease)  Risk Level of Procedure Intermediate Risk (intraperitoneal, intrathoracic, HENT, orthopedic, or carotid endarterectomy, etc.)  Revised Cardiac

## 2023-05-15 NOTE — PATIENT INSTRUCTIONS
SURVEY:    You may be receiving a survey from [employer] regarding your visit today. Please complete the survey to enable us to provide the highest quality of care to you and your family. If you cannot score us a very good on any question, please call the office to discuss how we could have made your experience a very good one. Thank you.

## 2023-05-19 ENCOUNTER — HOSPITAL ENCOUNTER (OUTPATIENT)
Dept: INFUSION THERAPY | Age: 73
Discharge: HOME | End: 2023-05-19
Payer: MEDICARE

## 2023-05-19 VITALS
TEMPERATURE: 98.4 F | SYSTOLIC BLOOD PRESSURE: 129 MMHG | HEART RATE: 65 BPM | RESPIRATION RATE: 18 BRPM | DIASTOLIC BLOOD PRESSURE: 68 MMHG

## 2023-05-19 DIAGNOSIS — D45 POLYCYTHEMIA VERA (HCC): Primary | ICD-10-CM

## 2023-05-19 PROCEDURE — 99195 PHLEBOTOMY: CPT

## 2023-05-19 RX ORDER — 0.9 % SODIUM CHLORIDE 0.9 %
500 INTRAVENOUS SOLUTION INTRAVENOUS ONCE
OUTPATIENT
Start: 2023-05-19 | End: 2023-05-19

## 2023-05-19 RX ORDER — 0.9 % SODIUM CHLORIDE 0.9 %
250 INTRAVENOUS SOLUTION INTRAVENOUS ONCE
OUTPATIENT
Start: 2023-05-19 | End: 2023-05-19

## 2023-05-19 NOTE — PLAN OF CARE
Problem: Chronic Conditions and Co-morbidities  Goal: Patient's chronic conditions and co-morbidity symptoms are monitored and maintained or improved  Outcome: Adequate for Discharge     Problem: Chronic Conditions and Co-morbidities  Goal: Patient's chronic conditions and co-morbidity symptoms are monitored and maintained or improved  Outcome: Adequate for Discharge     Problem: KNOWLEDGE DEFICIT  Goal: Patient/S.O. demonstrates understanding of disease process, treatment plan, medications, and discharge instructions.   Outcome: Adequate for Discharge

## 2023-06-05 ENCOUNTER — OFFICE VISIT (OUTPATIENT)
Dept: PULMONOLOGY | Age: 73
End: 2023-06-05
Payer: MEDICARE

## 2023-06-05 VITALS
RESPIRATION RATE: 16 BRPM | SYSTOLIC BLOOD PRESSURE: 155 MMHG | HEIGHT: 70 IN | BODY MASS INDEX: 30.78 KG/M2 | DIASTOLIC BLOOD PRESSURE: 75 MMHG | TEMPERATURE: 97.7 F | HEART RATE: 66 BPM | OXYGEN SATURATION: 98 % | WEIGHT: 215 LBS

## 2023-06-05 DIAGNOSIS — D45 POLYCYTHEMIA VERA (HCC): ICD-10-CM

## 2023-06-05 DIAGNOSIS — I50.32 CHRONIC DIASTOLIC (CONGESTIVE) HEART FAILURE (HCC): ICD-10-CM

## 2023-06-05 DIAGNOSIS — R09.82 POST-NASAL DRIP: ICD-10-CM

## 2023-06-05 DIAGNOSIS — E66.09 OBESITY DUE TO EXCESS CALORIES, UNSPECIFIED OBESITY SEVERITY: ICD-10-CM

## 2023-06-05 DIAGNOSIS — I82.412 DVT FEMORAL (DEEP VENOUS THROMBOSIS) WITH THROMBOPHLEBITIS, LEFT (HCC): ICD-10-CM

## 2023-06-05 DIAGNOSIS — I27.20 PULMONARY HYPERTENSION (HCC): ICD-10-CM

## 2023-06-05 DIAGNOSIS — G47.33 OSA (OBSTRUCTIVE SLEEP APNEA): Primary | ICD-10-CM

## 2023-06-05 PROCEDURE — 1090F PRES/ABSN URINE INCON ASSESS: CPT | Performed by: INTERNAL MEDICINE

## 2023-06-05 PROCEDURE — 1036F TOBACCO NON-USER: CPT | Performed by: INTERNAL MEDICINE

## 2023-06-05 PROCEDURE — 3078F DIAST BP <80 MM HG: CPT | Performed by: INTERNAL MEDICINE

## 2023-06-05 PROCEDURE — 1123F ACP DISCUSS/DSCN MKR DOCD: CPT | Performed by: INTERNAL MEDICINE

## 2023-06-05 PROCEDURE — G8399 PT W/DXA RESULTS DOCUMENT: HCPCS | Performed by: INTERNAL MEDICINE

## 2023-06-05 PROCEDURE — G8427 DOCREV CUR MEDS BY ELIG CLIN: HCPCS | Performed by: INTERNAL MEDICINE

## 2023-06-05 PROCEDURE — 99214 OFFICE O/P EST MOD 30 MIN: CPT | Performed by: INTERNAL MEDICINE

## 2023-06-05 PROCEDURE — G8417 CALC BMI ABV UP PARAM F/U: HCPCS | Performed by: INTERNAL MEDICINE

## 2023-06-05 PROCEDURE — 3017F COLORECTAL CA SCREEN DOC REV: CPT | Performed by: INTERNAL MEDICINE

## 2023-06-05 PROCEDURE — 3074F SYST BP LT 130 MM HG: CPT | Performed by: INTERNAL MEDICINE

## 2023-06-05 NOTE — PROGRESS NOTES
in her father and mother; Stroke in her mother. SOCIAL AND OCCUPATIONAL HEALTH:  The patient is a Never smoker. There  is not history of TB or TB exposure. There is not asbestos or silica dust exposure. The patient reports does not have coal, foundry, quarry or [employer] exposure. Travel history reveals no significant history of risk factors for pulm disease. There is not  history of recreational or IV drug use. The patient does not pets, dogs, cats turtles or exotic birds. Review of Systems:  Review of Systems -   General ROS: Completed and except as mentioned above were negative   Psychological ROS:  Completed and except as mentioned above were negative  Ophthalmic ROS:  Completed and except as mentioned above were negative  ENT ROS:  Completed and except as mentioned above were negative  Allergy and Immunology ROS:  Completed and except as mentioned above were negative  Hematological and Lymphatic ROS:  Completed and except as mentioned above were negative  Endocrine ROS: Completed and except as mentioned above were negative  Breast ROS:  Completed and except as mentioned above were negative  Respiratory ROS:  Completed and except as mentioned above were negative  Cardiovascular ROS:  Completed and except as mentioned above were negative  Gastrointestinal ROS: Completed and except as mentioned above were negative  Genito-Urinary ROS:  Completed and except as mentioned above were negative  Musculoskeletal ROS:  Completed and except as mentioned above were negative  Neurological ROS:  Completed and except as mentioned above were negative  Dermatological ROS:  Completed and except as mentioned above were negative    SLEEP  No epistaxis or sore throat. Patient has been using her CPAP machine with improvement in the daytime sleepiness and fatigue and tiredness  No MVA. No edema.       PHYSICAL EXAMINATION:  Vitals:    06/05/23 0938 06/05/23 0942   BP: (!) 149/82 (!) 155/75   Pulse: 66 66   Resp: 16

## 2023-06-05 NOTE — PATIENT INSTRUCTIONS
SURVEY:    You may be receiving a survey from [employer] regarding your visit today. Please complete the survey to enable us to provide the highest quality of care to you and your family. If you cannot score us a very good on any question, please call the office to discuss how we could have made your experience a very good one. Thank you. Please recheck your blood pressure when you go home and make sure you take your prescribed medication if applicable . Please follow up with your PCP or ER if needed.

## 2023-06-15 ENCOUNTER — HOSPITAL ENCOUNTER (OUTPATIENT)
Dept: CARDIAC CATH/INVASIVE PROCEDURES | Age: 73
Discharge: HOME | End: 2023-06-15
Attending: FAMILY MEDICINE | Admitting: FAMILY MEDICINE
Payer: MEDICARE

## 2023-06-15 VITALS
HEART RATE: 66 BPM | RESPIRATION RATE: 12 BRPM | BODY MASS INDEX: 30.35 KG/M2 | WEIGHT: 212 LBS | DIASTOLIC BLOOD PRESSURE: 60 MMHG | SYSTOLIC BLOOD PRESSURE: 137 MMHG | OXYGEN SATURATION: 96 % | TEMPERATURE: 97.9 F | HEIGHT: 70 IN

## 2023-06-15 LAB
ERYTHROCYTE [DISTWIDTH] IN BLOOD BY AUTOMATED COUNT: 14.1 % (ref 11.8–14.4)
GLUCOSE BLD-MCNC: 102 MG/DL (ref 74–100)
HCT VFR BLD AUTO: 43.2 % (ref 36.3–47.1)
HGB BLD-MCNC: 13.8 G/DL (ref 11.9–15.1)
MCH RBC QN AUTO: 29.7 PG (ref 25.2–33.5)
MCHC RBC AUTO-ENTMCNC: 31.9 G/DL (ref 28.4–34.8)
MCV RBC AUTO: 92.9 FL (ref 82.6–102.9)
NRBC AUTOMATED: 0 PER 100 WBC
PLATELET # BLD AUTO: 179 K/UL (ref 138–453)
PMV BLD AUTO: 10.4 FL (ref 8.1–13.5)
RBC # BLD AUTO: 4.65 M/UL (ref 3.95–5.11)
WBC OTHER # BLD: 5 K/UL (ref 3.5–11.3)

## 2023-06-15 PROCEDURE — 36415 COLL VENOUS BLD VENIPUNCTURE: CPT

## 2023-06-15 PROCEDURE — 6360000004 HC RX CONTRAST MEDICATION: Performed by: FAMILY MEDICINE

## 2023-06-15 PROCEDURE — 6360000002 HC RX W HCPCS

## 2023-06-15 PROCEDURE — 82947 ASSAY GLUCOSE BLOOD QUANT: CPT

## 2023-06-15 PROCEDURE — 93458 L HRT ARTERY/VENTRICLE ANGIO: CPT | Performed by: FAMILY MEDICINE

## 2023-06-15 PROCEDURE — 2500000003 HC RX 250 WO HCPCS

## 2023-06-15 PROCEDURE — 85027 COMPLETE CBC AUTOMATED: CPT

## 2023-06-15 PROCEDURE — 2709999900 HC NON-CHARGEABLE SUPPLY

## 2023-06-15 PROCEDURE — C1894 INTRO/SHEATH, NON-LASER: HCPCS

## 2023-06-15 PROCEDURE — 93458 L HRT ARTERY/VENTRICLE ANGIO: CPT

## 2023-06-15 PROCEDURE — C1769 GUIDE WIRE: HCPCS

## 2023-06-15 RX ORDER — SODIUM CHLORIDE 9 MG/ML
INJECTION, SOLUTION INTRAVENOUS PRN
Status: DISCONTINUED | OUTPATIENT
Start: 2023-06-15 | End: 2023-06-15 | Stop reason: HOSPADM

## 2023-06-15 RX ORDER — SODIUM CHLORIDE 0.9 % (FLUSH) 0.9 %
5-40 SYRINGE (ML) INJECTION PRN
Status: DISCONTINUED | OUTPATIENT
Start: 2023-06-15 | End: 2023-06-15 | Stop reason: HOSPADM

## 2023-06-15 RX ORDER — SODIUM CHLORIDE 0.9 % (FLUSH) 0.9 %
5-40 SYRINGE (ML) INJECTION EVERY 12 HOURS SCHEDULED
Status: DISCONTINUED | OUTPATIENT
Start: 2023-06-15 | End: 2023-06-15 | Stop reason: HOSPADM

## 2023-06-15 RX ORDER — NITROGLYCERIN 0.4 MG/1
0.4 TABLET SUBLINGUAL EVERY 5 MIN PRN
Status: DISCONTINUED | OUTPATIENT
Start: 2023-06-15 | End: 2023-06-15 | Stop reason: HOSPADM

## 2023-06-15 RX ORDER — ACETAMINOPHEN 325 MG/1
650 TABLET ORAL EVERY 4 HOURS PRN
Status: DISCONTINUED | OUTPATIENT
Start: 2023-06-15 | End: 2023-06-15 | Stop reason: HOSPADM

## 2023-06-15 RX ORDER — SODIUM CHLORIDE 9 MG/ML
INJECTION, SOLUTION INTRAVENOUS CONTINUOUS
Status: DISCONTINUED | OUTPATIENT
Start: 2023-06-15 | End: 2023-06-15 | Stop reason: HOSPADM

## 2023-06-15 RX ORDER — DIPHENHYDRAMINE HCL 25 MG
50 CAPSULE ORAL ONCE
Status: DISCONTINUED | OUTPATIENT
Start: 2023-06-15 | End: 2023-06-15 | Stop reason: HOSPADM

## 2023-06-15 RX ADMIN — IOPAMIDOL 50 ML: 755 INJECTION, SOLUTION INTRAVENOUS at 11:48

## 2023-06-15 ASSESSMENT — PAIN DESCRIPTION - LOCATION: LOCATION: CHEST

## 2023-06-15 ASSESSMENT — PAIN DESCRIPTION - ORIENTATION: ORIENTATION: MID

## 2023-06-15 ASSESSMENT — PAIN DESCRIPTION - DESCRIPTORS: DESCRIPTORS: BURNING

## 2023-06-15 ASSESSMENT — PAIN SCALES - GENERAL: PAINLEVEL_OUTOF10: 4

## 2023-06-15 NOTE — OP NOTE
-  Coronary Angiography Brief Post Operative Note:    Mild coronary artery disease without any significant focal stenosis. Normal left ventricular end diastolic pressure (LVEDP). Continue standard risk factor modification as clinically indicated and consider alternative etiologies of the patients symptoms.      Electronically signed by Avi Horton MD on 6/15/2023 at 12:01 PM

## 2023-06-15 NOTE — DISCHARGE INSTRUCTIONS
Discharge Instructions for Cardiac Catheterization    A cardiac catheterization is a diagnostic test used to evaluate the health of the heart and its blood vessels. The test is done with a thin catheter carefully threaded into your heart from a leg or arm artery. Most likely, you will be allowed to go home the same day as the procedure. Steps to Take at Home:   Pain- apply ice to site 15-20 minutes every hour for the first 2 days. Showering is okay 24 hours after procedure. No soaking in a pool, hot tub, bath tub, or standing water for one week. Bleeding (outward or under the skin-hematoma)- apply firm pressure for 10-15 minutes or until the bleeding stops, then call your doctor. If unable to get bleeding stopped, call 911. Kidney damage- Call if you urinate less than normal, have swelling or feel puffy, and/or gain 2 or more pounds over night in the first week. If procedure was in ARM:  You were instructed to keep wrist straight and still for two hours after the procedure. The arm and hand may now be used for normal daily activities except, avoid using the heal of hand while getting up and down from furniture for the first few days. Keep affected arm elevated, hand higher than elbow, while pressure dressing in place to decrease swelling. 1)  Gauze and Elastoplast   Remove in 4 hours as follows:     TIME:____6:00PM________  Remove 1 piece of tape at a time, waiting 15 -20 minutes between layers to monitor for bleeding. If dressing sticks, place wrist under cool running water to help loosen gauze from site then pat site dry. *** If hand feels numb, tingly, and/or cold- loosen first 1-2 layers of tape if dressing still in place. If no relief noticed, remove pressure dressing as per above instructions. Seek medical help if no relief or if dressing already off. Diet   Drink plenty of fluids after the test to flush the x-ray dye from your system. Return to your normal diet.    No alcoholic

## 2023-06-15 NOTE — PROGRESS NOTES
Inpatients must meet criteria 1 through 7.   1. Minimum 30 minutes after last dose of sedative medication, minimum 120 minutes after last dose of reversal agent. Yes   2. Systolic BP stable within 20 mmHg for 30 minutes & systolic BP between 90 & 795 or within 10 mmHg of baseline. Yes   3. Pulse between 60 and 100 or within 10 bpm of baseline. Yes   4. Spontaneous respiratory rate >/= 10 per minute. Yes   5. SaO2 >/= 95 or >/= baseline. Yes   6. Able to cough and swallow or return to baseline function. Yes   7. Alert and oriented or return to baseline mental status. Yes   8. Demonstrates controlled, coordinated movements, ambulates with steady gait, or return to baseline activity function. Yes   9. Minimal or no pain or nausea, or at a level tolerable and acceptable to patient. Yes   10. Takes and retains oral fluids as allowed. Yes   11. Procedural / perioperative site stable. Minimal or no bleeding. Yes   12. If GI endoscopy procedure, minimal or no abdominal distention or passing flatus. N/A   13. Written discharge instructions and emergency telephone number provided. Yes   14. Accompanied by a responsible adult. Yes   Adult patient discharged from facility without responsible person meets above criteria plus the following:   a) remains awake without stimulus for 30 minutes   b) oriented appropriate for age   c) all vital signs stable   d) no significant risk of losing protective reflexes   e) able to maintain pre-procedure mobility without assistance   f) no nausea or dizziness   g) transportation arrangements that do not require patient to operate motor Vehicle.    N/A

## 2023-06-21 ENCOUNTER — OFFICE VISIT (OUTPATIENT)
Dept: VASCULAR SURGERY | Age: 73
End: 2023-06-21
Payer: MEDICARE

## 2023-06-21 VITALS
DIASTOLIC BLOOD PRESSURE: 68 MMHG | BODY MASS INDEX: 29.96 KG/M2 | HEIGHT: 70 IN | TEMPERATURE: 97 F | RESPIRATION RATE: 18 BRPM | SYSTOLIC BLOOD PRESSURE: 122 MMHG | HEART RATE: 80 BPM | WEIGHT: 209.3 LBS

## 2023-06-21 DIAGNOSIS — M79.605 LEG PAIN, LEFT: ICD-10-CM

## 2023-06-21 DIAGNOSIS — I83.811 VARICOSE VEINS OF LEG WITH PAIN, RIGHT: Primary | ICD-10-CM

## 2023-06-21 PROCEDURE — 1123F ACP DISCUSS/DSCN MKR DOCD: CPT | Performed by: INTERNAL MEDICINE

## 2023-06-21 PROCEDURE — 1036F TOBACCO NON-USER: CPT | Performed by: INTERNAL MEDICINE

## 2023-06-21 PROCEDURE — 3017F COLORECTAL CA SCREEN DOC REV: CPT | Performed by: INTERNAL MEDICINE

## 2023-06-21 PROCEDURE — G8399 PT W/DXA RESULTS DOCUMENT: HCPCS | Performed by: INTERNAL MEDICINE

## 2023-06-21 PROCEDURE — 1090F PRES/ABSN URINE INCON ASSESS: CPT | Performed by: INTERNAL MEDICINE

## 2023-06-21 PROCEDURE — 3074F SYST BP LT 130 MM HG: CPT | Performed by: INTERNAL MEDICINE

## 2023-06-21 PROCEDURE — 3078F DIAST BP <80 MM HG: CPT | Performed by: INTERNAL MEDICINE

## 2023-06-21 PROCEDURE — G8427 DOCREV CUR MEDS BY ELIG CLIN: HCPCS | Performed by: INTERNAL MEDICINE

## 2023-06-21 PROCEDURE — 99214 OFFICE O/P EST MOD 30 MIN: CPT | Performed by: INTERNAL MEDICINE

## 2023-06-21 PROCEDURE — G8417 CALC BMI ABV UP PARAM F/U: HCPCS | Performed by: INTERNAL MEDICINE

## 2023-06-21 NOTE — PROGRESS NOTES
Hortencia Patton was seen on 6/21/2023 for   Chief Complaint   Patient presents with    Follow-up     Having some knee pain   .                             REVIEW OF SYSTEMS    Constitutional Weight: absent, A, Fatigue: present Fever: absent   HEENT Ears: normal,Visual disturbance: absent Sore throat: absent,    Respiratory Shortness of breath: absent, Cough: absent;, Snoring: absent   Cardivascular Chest pain: absent,  Leg pain: present,Leg swelling:absent, Non-healing wound:absent    GI Diarrhea: absent, Abdominal Pain: absent    Urinary frequency: present, Urinary incontinence: absent   Musculoskeletal Neck pain: absent, Back pain: absent, Restless Leg:absent     Dermatological Hair loss: absent, Skin changes: absent   Neurological  Sudden Loss of Vision in one eye:absent, Slurred Speech:absent, Weakness on one side of body: absent,Headache: absent   Psychiatric Anxiety: absent, Depression: absent   Hematologic Abnormal bleeding: absent,
daily D3      Blood Glucose Monitoring Suppl (FREESTYLE LITE) KORY       Garlic 609 MG TABS Take  by mouth daily. Coenzyme Q10 (COQ10) 50 MG CAPS Take  by mouth daily. Multiple Vitamins-Minerals (THERAPEUTIC MULTIVITAMIN-MINERALS) tablet Take 1 tablet by mouth daily      Acai Berry 500 MG CAPS Take 1 tablet by mouth daily. FLAXSEED OIL Take 1 tablet by mouth daily       Omega-3 Fatty Acids (OMEGA 3 PO) Take 1 tablet by mouth daily  (Patient not taking: Reported on 6/21/2023)       No current facility-administered medications for this visit. Facility-Administered Medications Ordered in Other Visits   Medication Dose Route Frequency Provider Last Rate Last Admin    sodium chloride flush 0.9 % injection 10 mL  10 mL IntraVENous PRN Nasfat Marice Lombard, MD        sodium chloride (PF) 0.9 % injection 10 mL  10 mL IntraVENous PRN Benita Arguello MD             Physical findings:  General- no acute distress, oriented  HEENT - no xanthelasma, external ears normal  Neck- Supple, no thyromegaly  Skin- warm, dry, no skin breakdown or gangrene. hyperpigmentation  Extremities - no edema, mild small ropey vv right foot dorsum    Pulses Right - Posterior tibial:    absent  Dorsalis pedis:  2+     Pulses Left -Popliteal: normal      Assessment:  1. Varicose veins of leg with pain, right    2. Leg pain, left       No significant vein issues  No popliteal aneurysm or cyst by exam  Rtc 1 yr  30 min chart review and pt encounter  Plan of care: Follow up and evaluate.        Electronically signed by Esther Ureña MD on 6/21/23 at 10:30 AM EDT

## 2023-06-21 NOTE — PATIENT INSTRUCTIONS
SURVEY:    You may be receiving a survey from [employer] regarding your visit today. Please complete the survey to enable us to provide the highest quality of care to you and your family. If you cannot score us a very good on any question, please call the office to discuss how we could have made your experience a very good one. Thank you.

## 2023-07-21 NOTE — PROGRESS NOTES
Patient instructed per phone interview on the pre-operative, intra-operative, and post-operative process, as well as NPO status. Patient will take Amlodipine, Levothyoxine, Labetolol, and Pepcid. Patient to hold ASA and all OTC vitamins and supplements for 7 days prior to surgery. Pre-operative instruction sheet reviewed as well as CHG skin prep instructions. Verbalizes understanding.

## 2023-07-25 ENCOUNTER — OFFICE VISIT (OUTPATIENT)
Dept: OBGYN | Age: 73
End: 2023-07-25
Payer: MEDICARE

## 2023-07-25 VITALS
BODY MASS INDEX: 30.06 KG/M2 | HEIGHT: 70 IN | DIASTOLIC BLOOD PRESSURE: 78 MMHG | WEIGHT: 210 LBS | SYSTOLIC BLOOD PRESSURE: 130 MMHG

## 2023-07-25 DIAGNOSIS — N81.11 CYSTOCELE, MIDLINE: ICD-10-CM

## 2023-07-25 DIAGNOSIS — R39.15 URINARY URGENCY: ICD-10-CM

## 2023-07-25 DIAGNOSIS — N99.3 VAGINAL VAULT PROLAPSE AFTER HYSTERECTOMY: Primary | ICD-10-CM

## 2023-07-25 PROCEDURE — G8417 CALC BMI ABV UP PARAM F/U: HCPCS | Performed by: OBSTETRICS & GYNECOLOGY

## 2023-07-25 PROCEDURE — G8399 PT W/DXA RESULTS DOCUMENT: HCPCS | Performed by: OBSTETRICS & GYNECOLOGY

## 2023-07-25 PROCEDURE — 1036F TOBACCO NON-USER: CPT | Performed by: OBSTETRICS & GYNECOLOGY

## 2023-07-25 PROCEDURE — G8427 DOCREV CUR MEDS BY ELIG CLIN: HCPCS | Performed by: OBSTETRICS & GYNECOLOGY

## 2023-07-25 PROCEDURE — 1123F ACP DISCUSS/DSCN MKR DOCD: CPT | Performed by: OBSTETRICS & GYNECOLOGY

## 2023-07-25 PROCEDURE — 3078F DIAST BP <80 MM HG: CPT | Performed by: OBSTETRICS & GYNECOLOGY

## 2023-07-25 PROCEDURE — 3075F SYST BP GE 130 - 139MM HG: CPT | Performed by: OBSTETRICS & GYNECOLOGY

## 2023-07-25 PROCEDURE — 3017F COLORECTAL CA SCREEN DOC REV: CPT | Performed by: OBSTETRICS & GYNECOLOGY

## 2023-07-25 PROCEDURE — 99213 OFFICE O/P EST LOW 20 MIN: CPT | Performed by: OBSTETRICS & GYNECOLOGY

## 2023-07-25 PROCEDURE — 1090F PRES/ABSN URINE INCON ASSESS: CPT | Performed by: OBSTETRICS & GYNECOLOGY

## 2023-07-25 NOTE — H&P (VIEW-ONLY)
(AMARYL) 2 MG tablet Take 1 tablet by mouth with breakfast and with evening meal 90 tablet 0    metFORMIN (GLUCOPHAGE) 500 MG tablet Take 1 tablet by mouth 2 times daily (with meals) 180 tablet 1    FreeStyle Lancets MISC USE 1  TO CHECK GLUCOSE ONCE DAILY 100 each 0    labetalol (NORMODYNE) 200 MG tablet Take 1 tablet by mouth 2 times daily 180 tablet 3    aspirin 81 MG tablet Take 2 tablets by mouth daily      Chlorpheniramine Maleate (ALLERGY RELIEF PO) Take by mouth as needed      vitamin D 1000 UNITS CAPS Take by mouth daily D3      Blood Glucose Monitoring Suppl (FREESTYLE LITE) KORY       Garlic 874 MG TABS Take  by mouth daily. Coenzyme Q10 (COQ10) 50 MG CAPS Take  by mouth daily. Multiple Vitamins-Minerals (THERAPEUTIC MULTIVITAMIN-MINERALS) tablet Take 1 tablet by mouth daily      Acai Berry 500 MG CAPS Take 1 tablet by mouth daily. Omega-3 Fatty Acids (OMEGA 3 PO) Take 1 tablet by mouth daily      FLAXSEED OIL Take 1 tablet by mouth daily        No current facility-administered medications for this visit. Facility-Administered Medications Ordered in Other Visits   Medication Dose Route Frequency Provider Last Rate Last Admin    sodium chloride flush 0.9 % injection 10 mL  10 mL IntraVENous PRN Serena Wetzel MD        sodium chloride (PF) 0.9 % injection 10 mL  10 mL IntraVENous PRN Sandy Varela MD         Social History     Socioeconomic History    Marital status:       Spouse name: None    Number of children: None    Years of education: None    Highest education level: None   Tobacco Use    Smoking status: Never    Smokeless tobacco: Never   Vaping Use    Vaping Use: Never used   Substance and Sexual Activity    Alcohol use: No    Drug use: Never     Social Determinants of Health     Financial Resource Strain: Low Risk     Difficulty of Paying Living Expenses: Not hard at all   Food Insecurity: No Food Insecurity    Worried About [place] in the Last Year:

## 2023-07-31 ENCOUNTER — TELEPHONE (OUTPATIENT)
Dept: OBGYN CLINIC | Age: 73
End: 2023-07-31

## 2023-07-31 NOTE — TELEPHONE ENCOUNTER
Patient left a message on my voicemail questioning is she could use Claritin and/or Flonase prior to her 8/7 surgery. I attempted to call her back and had to leave a message. Reassured patient that she was able to use both medications. Patient to call with any further questions/concerns.

## 2023-08-04 ENCOUNTER — ANESTHESIA EVENT (OUTPATIENT)
Dept: OPERATING ROOM | Age: 73
End: 2023-08-04
Payer: MEDICARE

## 2023-08-07 ENCOUNTER — ANESTHESIA (OUTPATIENT)
Dept: OPERATING ROOM | Age: 73
End: 2023-08-07
Payer: MEDICARE

## 2023-08-07 ENCOUNTER — HOSPITAL ENCOUNTER (OUTPATIENT)
Age: 73
Setting detail: OUTPATIENT SURGERY
Discharge: HOME | End: 2023-08-07
Attending: OBSTETRICS & GYNECOLOGY | Admitting: OBSTETRICS & GYNECOLOGY
Payer: MEDICARE

## 2023-08-07 VITALS
HEART RATE: 64 BPM | SYSTOLIC BLOOD PRESSURE: 152 MMHG | RESPIRATION RATE: 16 BRPM | DIASTOLIC BLOOD PRESSURE: 57 MMHG | OXYGEN SATURATION: 92 % | BODY MASS INDEX: 29.99 KG/M2 | WEIGHT: 209 LBS | TEMPERATURE: 97.3 F

## 2023-08-07 DIAGNOSIS — G89.18 POST-OP PAIN: Primary | ICD-10-CM

## 2023-08-07 PROCEDURE — 6360000002 HC RX W HCPCS: Performed by: NURSE ANESTHETIST, CERTIFIED REGISTERED

## 2023-08-07 PROCEDURE — 2720000010 HC SURG SUPPLY STERILE: Performed by: OBSTETRICS & GYNECOLOGY

## 2023-08-07 PROCEDURE — 7100000010 HC PHASE II RECOVERY - FIRST 15 MIN: Performed by: OBSTETRICS & GYNECOLOGY

## 2023-08-07 PROCEDURE — 7100000011 HC PHASE II RECOVERY - ADDTL 15 MIN: Performed by: OBSTETRICS & GYNECOLOGY

## 2023-08-07 PROCEDURE — 3700000000 HC ANESTHESIA ATTENDED CARE: Performed by: OBSTETRICS & GYNECOLOGY

## 2023-08-07 PROCEDURE — 7100000000 HC PACU RECOVERY - FIRST 15 MIN: Performed by: OBSTETRICS & GYNECOLOGY

## 2023-08-07 PROCEDURE — 2500000003 HC RX 250 WO HCPCS: Performed by: NURSE ANESTHETIST, CERTIFIED REGISTERED

## 2023-08-07 PROCEDURE — 3700000001 HC ADD 15 MINUTES (ANESTHESIA): Performed by: OBSTETRICS & GYNECOLOGY

## 2023-08-07 PROCEDURE — 57282 COLPOPEXY EXTRAPERITONEAL: CPT | Performed by: OBSTETRICS & GYNECOLOGY

## 2023-08-07 PROCEDURE — 57240 ANTERIOR COLPORRHAPHY: CPT | Performed by: OBSTETRICS & GYNECOLOGY

## 2023-08-07 PROCEDURE — 7100000001 HC PACU RECOVERY - ADDTL 15 MIN: Performed by: OBSTETRICS & GYNECOLOGY

## 2023-08-07 PROCEDURE — 6370000000 HC RX 637 (ALT 250 FOR IP): Performed by: NURSE ANESTHETIST, CERTIFIED REGISTERED

## 2023-08-07 PROCEDURE — 3600000002 HC SURGERY LEVEL 2 BASE: Performed by: OBSTETRICS & GYNECOLOGY

## 2023-08-07 PROCEDURE — 6360000002 HC RX W HCPCS

## 2023-08-07 PROCEDURE — 2500000003 HC RX 250 WO HCPCS: Performed by: OBSTETRICS & GYNECOLOGY

## 2023-08-07 PROCEDURE — 3600000012 HC SURGERY LEVEL 2 ADDTL 15MIN: Performed by: OBSTETRICS & GYNECOLOGY

## 2023-08-07 PROCEDURE — 2709999900 HC NON-CHARGEABLE SUPPLY: Performed by: OBSTETRICS & GYNECOLOGY

## 2023-08-07 PROCEDURE — 2580000003 HC RX 258: Performed by: NURSE ANESTHETIST, CERTIFIED REGISTERED

## 2023-08-07 DEVICE — IMPLANTABLE DEVICE: Type: IMPLANTABLE DEVICE | Site: VAGINA | Status: FUNCTIONAL

## 2023-08-07 RX ORDER — CEFAZOLIN SODIUM IN 0.9 % NACL 2 G/100 ML
2000 PLASTIC BAG, INJECTION (ML) INTRAVENOUS
Status: COMPLETED | OUTPATIENT
Start: 2023-08-07 | End: 2023-08-07

## 2023-08-07 RX ORDER — LABETALOL HYDROCHLORIDE 5 MG/ML
10 INJECTION, SOLUTION INTRAVENOUS ONCE
Status: COMPLETED | OUTPATIENT
Start: 2023-08-07 | End: 2023-08-07

## 2023-08-07 RX ORDER — KETOROLAC TROMETHAMINE 30 MG/ML
INJECTION, SOLUTION INTRAMUSCULAR; INTRAVENOUS PRN
Status: DISCONTINUED | OUTPATIENT
Start: 2023-08-07 | End: 2023-08-07 | Stop reason: SDUPTHER

## 2023-08-07 RX ORDER — SODIUM CHLORIDE 0.9 % (FLUSH) 0.9 %
5-40 SYRINGE (ML) INJECTION EVERY 12 HOURS SCHEDULED
Status: DISCONTINUED | OUTPATIENT
Start: 2023-08-07 | End: 2023-08-07 | Stop reason: HOSPADM

## 2023-08-07 RX ORDER — HYDROCODONE BITARTRATE AND ACETAMINOPHEN 5; 325 MG/1; MG/1
1 TABLET ORAL EVERY 6 HOURS PRN
Status: DISCONTINUED | OUTPATIENT
Start: 2023-08-07 | End: 2023-08-07 | Stop reason: HOSPADM

## 2023-08-07 RX ORDER — FENTANYL CITRATE 50 UG/ML
INJECTION, SOLUTION INTRAMUSCULAR; INTRAVENOUS PRN
Status: DISCONTINUED | OUTPATIENT
Start: 2023-08-07 | End: 2023-08-07 | Stop reason: SDUPTHER

## 2023-08-07 RX ORDER — LIDOCAINE HYDROCHLORIDE 20 MG/ML
INJECTION, SOLUTION EPIDURAL; INFILTRATION; INTRACAUDAL; PERINEURAL PRN
Status: DISCONTINUED | OUTPATIENT
Start: 2023-08-07 | End: 2023-08-07 | Stop reason: SDUPTHER

## 2023-08-07 RX ORDER — HYDROCODONE BITARTRATE AND ACETAMINOPHEN 5; 325 MG/1; MG/1
1 TABLET ORAL EVERY 6 HOURS PRN
Qty: 10 TABLET | Refills: 0 | Status: SHIPPED | OUTPATIENT
Start: 2023-08-07 | End: 2023-08-12

## 2023-08-07 RX ORDER — SODIUM CHLORIDE 0.9 % (FLUSH) 0.9 %
5-40 SYRINGE (ML) INJECTION PRN
Status: DISCONTINUED | OUTPATIENT
Start: 2023-08-07 | End: 2023-08-07 | Stop reason: HOSPADM

## 2023-08-07 RX ORDER — ONDANSETRON 2 MG/ML
INJECTION INTRAMUSCULAR; INTRAVENOUS PRN
Status: DISCONTINUED | OUTPATIENT
Start: 2023-08-07 | End: 2023-08-07 | Stop reason: SDUPTHER

## 2023-08-07 RX ORDER — ACETAMINOPHEN 325 MG/1
650 TABLET ORAL ONCE
Status: COMPLETED | OUTPATIENT
Start: 2023-08-07 | End: 2023-08-07

## 2023-08-07 RX ORDER — SODIUM CHLORIDE, SODIUM LACTATE, POTASSIUM CHLORIDE, CALCIUM CHLORIDE 600; 310; 30; 20 MG/100ML; MG/100ML; MG/100ML; MG/100ML
INJECTION, SOLUTION INTRAVENOUS CONTINUOUS
Status: DISCONTINUED | OUTPATIENT
Start: 2023-08-07 | End: 2023-08-07 | Stop reason: HOSPADM

## 2023-08-07 RX ORDER — SODIUM CHLORIDE 9 MG/ML
INJECTION, SOLUTION INTRAVENOUS PRN
Status: DISCONTINUED | OUTPATIENT
Start: 2023-08-07 | End: 2023-08-07 | Stop reason: HOSPADM

## 2023-08-07 RX ORDER — PROPOFOL 10 MG/ML
INJECTION, EMULSION INTRAVENOUS PRN
Status: DISCONTINUED | OUTPATIENT
Start: 2023-08-07 | End: 2023-08-07 | Stop reason: SDUPTHER

## 2023-08-07 RX ORDER — ACYCLOVIR 200 MG/1
CAPSULE ORAL PRN
Status: DISCONTINUED | OUTPATIENT
Start: 2023-08-07 | End: 2023-08-07 | Stop reason: ALTCHOICE

## 2023-08-07 RX ORDER — LIDOCAINE HYDROCHLORIDE AND EPINEPHRINE 10; 10 MG/ML; UG/ML
INJECTION, SOLUTION INFILTRATION; PERINEURAL PRN
Status: DISCONTINUED | OUTPATIENT
Start: 2023-08-07 | End: 2023-08-07 | Stop reason: ALTCHOICE

## 2023-08-07 RX ORDER — DEXAMETHASONE SODIUM PHOSPHATE 4 MG/ML
INJECTION, SOLUTION INTRA-ARTICULAR; INTRALESIONAL; INTRAMUSCULAR; INTRAVENOUS; SOFT TISSUE PRN
Status: DISCONTINUED | OUTPATIENT
Start: 2023-08-07 | End: 2023-08-07 | Stop reason: SDUPTHER

## 2023-08-07 RX ORDER — KETOROLAC TROMETHAMINE 10 MG/1
10 TABLET, FILM COATED ORAL EVERY 6 HOURS PRN
Qty: 20 TABLET | Refills: 0 | Status: SHIPPED | OUTPATIENT
Start: 2023-08-07 | End: 2024-08-06

## 2023-08-07 RX ADMIN — PROPOFOL 150 MG: 10 INJECTION, EMULSION INTRAVENOUS at 10:31

## 2023-08-07 RX ADMIN — FENTANYL CITRATE 50 MCG: 50 INJECTION INTRAMUSCULAR; INTRAVENOUS at 10:31

## 2023-08-07 RX ADMIN — KETOROLAC TROMETHAMINE 15 MG: 30 INJECTION, SOLUTION INTRAMUSCULAR at 11:28

## 2023-08-07 RX ADMIN — Medication 2000 MG: at 10:24

## 2023-08-07 RX ADMIN — ONDANSETRON 4 MG: 2 INJECTION INTRAMUSCULAR; INTRAVENOUS at 10:40

## 2023-08-07 RX ADMIN — ACETAMINOPHEN 650 MG: 325 TABLET ORAL at 09:14

## 2023-08-07 RX ADMIN — SODIUM CHLORIDE, POTASSIUM CHLORIDE, SODIUM LACTATE AND CALCIUM CHLORIDE: 600; 310; 30; 20 INJECTION, SOLUTION INTRAVENOUS at 09:24

## 2023-08-07 RX ADMIN — LIDOCAINE HYDROCHLORIDE 100 MG: 20 INJECTION, SOLUTION EPIDURAL; INFILTRATION; INTRACAUDAL; PERINEURAL at 10:31

## 2023-08-07 RX ADMIN — LABETALOL HYDROCHLORIDE 10 MG: 5 INJECTION INTRAVENOUS at 12:26

## 2023-08-07 RX ADMIN — HYDROCODONE BITARTRATE AND ACETAMINOPHEN 1 TABLET: 5; 325 TABLET ORAL at 13:20

## 2023-08-07 RX ADMIN — DEXAMETHASONE SODIUM PHOSPHATE 4 MG: 4 INJECTION, SOLUTION INTRAMUSCULAR; INTRAVENOUS at 10:40

## 2023-08-07 ASSESSMENT — PAIN DESCRIPTION - LOCATION
LOCATION: PERINEUM
LOCATION: VAGINA

## 2023-08-07 ASSESSMENT — PAIN DESCRIPTION - PAIN TYPE
TYPE: SURGICAL PAIN

## 2023-08-07 ASSESSMENT — PAIN DESCRIPTION - DESCRIPTORS
DESCRIPTORS: PRESSURE
DESCRIPTORS: PRESSURE
DESCRIPTORS: ACHING;DULL
DESCRIPTORS: PRESSURE

## 2023-08-07 ASSESSMENT — PAIN SCALES - GENERAL
PAINLEVEL_OUTOF10: 3
PAINLEVEL_OUTOF10: 5
PAINLEVEL_OUTOF10: 4

## 2023-08-07 NOTE — PROGRESS NOTES
Bladder scanned after returning from restroom for less than 100 ml.  Reassurance given that everything is normal.

## 2023-08-07 NOTE — OP NOTE
Preoperative diagnosis:  1. Vaginal vault prolapse  2. Cystocele    Postoperative diagnosis: Same    Procedure: Sacral spinous ligament fixation and anterior repair with Dermapure    Surgeon: Moises Gongora D.O. Assistant: Lashae Acuña PGY 3    Anesthesia: Gen. Estimated blood loss: 20 mL    Fluids: Lactated Ringer's    Urine:  200 mL of clear urine    Findings: Patient had a grade 3 cystocele and vaginal vault prolapse    Condition: Stable    Complications: None    Specimens: None    Operative note: Patient was taken to the operating room where she was prepped and draped in usual sterile fashion in a dorsal lithotomy position. A weighted speculum was placed in the patient's vagina and an Allis was placed approximately 3 cm inferior to the urethral meatus in the midline of the anterior vaginal mucosa. . A second Allis was placed at the  Independence of the cystocele. The area between the 2 Allises and injected with 1% lidocaine in 50-50 blend with normal saline, using approximately 20 mL of fluid. The area between the 2 Allises was then incised with a scalpel. Sharp and blunt dissection were used to dissect back to the sacrospinous ligaments bilaterally. A Caitlin device was used to place 0 Monodex into the sacral spinous ligaments bilaterally. The Dermapure which had been soaking was tacked with 2-0 Vicryl to the apex of the vagina. The 0 sutures were then used to \"pulley\"  the Dermapure up to the sacral spinous ligaments, thereby supporting the vaginal apex. The inferior aspect of the Dermapure was tacked with 2-0 Vicryl just inferior to the bladder neck as well. The arcus tendineus was plicate in the midline with 2-0 Vicryl to further support the Dermapure. The excess vaginal mucosa was trimmed with Douglas scissors. The vaginal mucosa was then closed with running 3-0 Vicryl. The apex was well supported at the completion and the anterior wall was supported.   Sponge, lap, needle and instruments

## 2023-08-07 NOTE — PROGRESS NOTES
States \" I feel like I need to go to the bathroom again\" bladder scanned for approx. 320-450ml. Assisted to bathroom.

## 2023-08-07 NOTE — ANESTHESIA PRE PROCEDURE
Department of Anesthesiology  Preprocedure Note       Name:  Reggie Izaguirre   Age:  71 y.o.  :  1950                                          MRN:  [id number]         Date:  2023      Surgeon: Anupam Matta):  Christopher Holguin DO    Procedure: Procedure(s):  VAGINAL SUSPENSION: SACRAL SPINOUS LIGAMENT FIXATION WITH [facility]  VAGINAL ANTERIOR AND POSSIBLE POSTERIOR REPAIR    Medications prior to admission:   Prior to Admission medications    Medication Sig Start Date End Date Taking?  Authorizing Provider   Blood Glucose Monitoring Suppl (FREESTYLE LITE) KORY TEST BLOOD SUGAR ONCE DAILY AS DIRECTED 23   Pratik Living, APRN - CNP   amLODIPine (NORVASC) 10 MG tablet Take 1 tablet by mouth once daily 23   Pratik Living, MATT Cowart CNP   blood glucose test strips (FREESTYLE LITE) strip USE 1 STRIP TO CHECK GLUCOSE ONCE DAILY AS DIRECTED 23   Pratik Living, MATT Cowart CNP   fluticasone [facility]) 50 MCG/ACT nasal spray USE 2 SPRAY(S) IN EACH NOSTRIL ONCE DAILY 23   Pratik Living, APRN - CNP   albuterol sulfate HFA (PROVENTIL;VENTOLIN;PROAIR) 108 (90 Base) MCG/ACT inhaler INHALE 2 PUFFS BY MOUTH 4 TIMES DAILY AS NEEDED FOR WHEEZING 23   MATT Peng CNP   levothyroxine (SYNTHROID) 50 MCG tablet Take 1 tablet by mouth once daily 23   Pratik Living, MATT Cowart CNP   rosuvastatin (CRESTOR) 5 MG tablet Take 1 tablet by mouth nightly 23  Pratik Living, APRN - CNP   losartan (COZAAR) 100 MG tablet Take 1 tablet by mouth once daily 23   Pratik Living, APRN - CNP   famotidine (PEPCID) 20 MG tablet Take 1 tablet by mouth 2 times daily 23   Pratik Living, APRN - CNP   glimepiride (AMARYL) 2 MG tablet Take 1 tablet by mouth with breakfast and with evening meal 23   Pratik Living, APRN - CNP   metFORMIN (GLUCOPHAGE) 500 MG tablet Take 1 tablet by mouth 2 times daily (with meals) 23   Onur Holt
04/17/2012 07:58 AM       CMP:   Lab Results   Component Value Date/Time     06/13/2023 02:49 PM    K 3.9 06/13/2023 02:49 PM     06/13/2023 02:49 PM    CO2 23 06/13/2023 02:49 PM    BUN 26 06/13/2023 02:49 PM    CREATININE 0.49 06/13/2023 02:49 PM    GFRAA >60 04/06/2022 01:50 PM    LABGLOM >60 06/13/2023 02:49 PM    GLUCOSE 151 06/13/2023 02:49 PM    GLUCOSE 108 08/03/2021 10:40 AM    PROT 6.2 04/04/2023 11:00 AM    CALCIUM 10.0 06/13/2023 02:49 PM    BILITOT 0.8 04/04/2023 11:00 AM    BILITOT NEGATIVE 05/06/2019 08:01 AM    ALKPHOS 68 04/04/2023 11:00 AM    AST 17 04/04/2023 11:00 AM    ALT 21 04/04/2023 11:00 AM       POC Tests: No results for input(s): POCGLU, POCNA, POCK, POCCL, POCBUN, POCHEMO, POCHCT in the last 72 hours. Coags: No results found for: PROTIME, INR, APTT    HCG (If Applicable): No results found for: PREGTESTUR, PREGSERUM, HCG, HCGQUANT     ABGs: No results found for: PHART, PO2ART, LEC0XXR, VRJ7MLI, BEART, P2YUCYHL     Type & Screen (If Applicable):  No results found for: LABABO, LABRH    Drug/Infectious Status (If Applicable):  Lab Results   Component Value Date/Time    HEPCAB NEGATIVE 08/14/2017 11:27 AM       COVID-19 Screening (If Applicable):   Lab Results   Component Value Date/Time    COVID19 Not Detected 11/12/2020 03:32 PM           Anesthesia Evaluation    Airway:           Dental:          Pulmonary:                              Cardiovascular:                      Neuro/Psych:               GI/Hepatic/Renal:   (+) hiatal hernia,           Endo/Other:                     Abdominal:             Vascular:           Other Findings:           Anesthesia Plan        MATT Curiel CRNA   8/7/2023

## 2023-08-07 NOTE — PROGRESS NOTES
Discharge instructions given to patient and son with understanding voiced. No questions asked at this time.

## 2023-08-07 NOTE — DISCHARGE INSTRUCTIONS
SAME DAY SURGERY DISCHARGE INSTRUCTIONS    1. Do not drive or operate hazardous machinery for 24 hours. 2.  Do not make important personal or business decisions for 24 hours. 3.  Do not drink alcoholic beverages for 24 hours. 4.  Do not smoke tobacco products for 24 hours. 5.  Eat light foods (Jell-O, soups, etc....) and drink plenty of fluids (water, Sprite, etc...) up to 8 glasses per day, as you can tolerate. 6.  Limit your activities for 24 hours. Do not engage in heavy work until your surgeon gives you permission. 7.  Patient should not be left alone for 12-24 hours following surgical procedure. 8.  Wash hands before and after incision care. It is important to practice good personal hygiene during the post op period. 9.  Notify your doctor immediately of any of the following:    Excessive swelling of, or around the wound area. Redness. Temperature of 100 degrees (F) or above. Excessive pain. Unable to urinate or empty bladder 4-6 hours after surgery. Excessive bleeding at incision site. 10.  Call your surgeon for any questions regarding your surgery. POST-OPERATIVE INSTRUCTIONS     Activity as tolerated; may shower and change dressings as needed. Pain medication to be taken as directed for discomfort. No sexual relations, douches, tampons, tub baths, swimming in ponds/pools, or hot tubs for 6 weeks or until seen by the doctor for your follow-up appointment. May have some vaginal drainage for 1-2 weeks, call if excessive. Call the office at [phone] ([place]) [phone] Tez Munguia for an appointment in 2 weeks.

## 2023-08-07 NOTE — PROGRESS NOTES
Discharge Criteria    Inpatients must meet Criteria 1 through 7. All other patients are either YES or N/A. If a NO is chosen then Anesthesia or Surgeon must be notified. 1.  Minimum 30 minutes after last dose of sedative medication. Yes      2. Systolic BP between 90 - 422. Diastolic BP between 60 - 90. Yes      3. Pulse between 60 - 120    Yes      4. Respirations between 8 - 25. Yes      5. SpO2 92% - 100%. Yes      6. Able to cough and swallow or return to baseline function. Yes      7. Alert and oriented or return to baseline mental status. Yes      8. Demonstrates controlled, coordinated movements, ambulates with steady gait, or return to baseline activity function. Yes      9. Minimal or no pain or nausea, or at a level tolerable and acceptable to patient. Yes      10. Takes and retains oral fluids as allowed. Yes      11. Procedural / perioperative site stable. Minimal or no bleeding. Yes          12. If GI endoscopy procedure, minimal or no abdominal distention or passing flatus. N/A      13. Written discharge instructions and emergency telephone number provided. Yes      14. Accompanied by a responsible adult.     Yes

## 2023-08-07 NOTE — PROGRESS NOTES
Ambulated back to chair from bathroom per one assist, gait steady, no dizziness or lightheadedness noted.

## 2023-08-16 ENCOUNTER — HOSPITAL ENCOUNTER (OUTPATIENT)
Dept: WOMENS IMAGING | Age: 73
Discharge: HOME | End: 2023-08-18
Payer: MEDICARE

## 2023-08-16 ENCOUNTER — OFFICE VISIT (OUTPATIENT)
Dept: OBGYN | Age: 73
End: 2023-08-16
Payer: MEDICARE

## 2023-08-16 VITALS
DIASTOLIC BLOOD PRESSURE: 90 MMHG | SYSTOLIC BLOOD PRESSURE: 140 MMHG | BODY MASS INDEX: 29.78 KG/M2 | HEIGHT: 70 IN | WEIGHT: 208 LBS

## 2023-08-16 DIAGNOSIS — Z78.0 ASYMPTOMATIC MENOPAUSE: ICD-10-CM

## 2023-08-16 DIAGNOSIS — Z12.31 SCREENING MAMMOGRAM FOR HIGH-RISK PATIENT: ICD-10-CM

## 2023-08-16 DIAGNOSIS — Z09 POSTOPERATIVE EXAMINATION: Primary | ICD-10-CM

## 2023-08-16 PROCEDURE — 77080 DXA BONE DENSITY AXIAL: CPT

## 2023-08-16 PROCEDURE — 77063 BREAST TOMOSYNTHESIS BI: CPT

## 2023-08-16 PROCEDURE — 99211 OFF/OP EST MAY X REQ PHY/QHP: CPT

## 2023-08-16 PROCEDURE — 99024 POSTOP FOLLOW-UP VISIT: CPT

## 2023-08-16 NOTE — PROGRESS NOTES
Postop Progress Note    Subjective    Corinne Sal presents to the office for postop follow up. Chief Complaint   Patient presents with    Post-Op Check     8/7/23 pt had Sacral spinous ligament fixation and anterior repair with Dermapure, pt states she was bleeding up until yesterday (8/15/23). Pt would like to discuss restrictions. Objective    Vitals:    08/16/23 1101   BP: (!) 140/90     General: alert, cooperative and no distress  Incision: healing well    Assessment  Doing well postoperatively. Reports that she was having some light bleeding but it has stopped now. She was having some discomfort after doing some light work in her yard. She did note a small bump/scrape on labia after surgery that she has been putting neosporin on and seems to be improving. Doing well with restrictions. Plan  1. Continue any current medications  2. Wound care discussed  3. Pt is to continue with activity restrictions   4. Usual diet  5.  Follow up: 4 weeks     Electronically signed by Corita Soulier, PA-C on 8/16/2023 at 11:25 AM

## 2023-08-17 NOTE — RESULT ENCOUNTER NOTE
Please call pt and inform them their dexa results are normal. But bone density decreased by 10%. Recommend calcium supplement daily with Vit D3 and walking daily.

## 2023-08-29 ENCOUNTER — TELEPHONE (OUTPATIENT)
Dept: PULMONOLOGY | Age: 73
End: 2023-08-29

## 2023-08-29 DIAGNOSIS — Z99.89 OSA ON CPAP: Primary | ICD-10-CM

## 2023-08-29 DIAGNOSIS — G47.33 OSA ON CPAP: Primary | ICD-10-CM

## 2023-08-29 NOTE — TELEPHONE ENCOUNTER
Patient is reporting the pressure on her cpap machine is too much and blowing her mask off. Tonio Mcgee is her [employer] company. Please advise.

## 2023-08-31 NOTE — TELEPHONE ENCOUNTER
Faxed new order to [facility] and called patient  and informed her of the pressure change and the need for another Download in 1 month and patient verbalized understanding.

## 2023-09-19 ENCOUNTER — TELEPHONE (OUTPATIENT)
Dept: OBGYN | Age: 73
End: 2023-09-19

## 2023-09-19 ENCOUNTER — OFFICE VISIT (OUTPATIENT)
Dept: OBGYN | Age: 73
End: 2023-09-19

## 2023-09-19 ENCOUNTER — HOSPITAL ENCOUNTER (OUTPATIENT)
Age: 73
Setting detail: SPECIMEN
Discharge: HOME | End: 2023-09-19
Payer: MEDICARE

## 2023-09-19 VITALS
WEIGHT: 209 LBS | SYSTOLIC BLOOD PRESSURE: 132 MMHG | HEIGHT: 70 IN | BODY MASS INDEX: 29.92 KG/M2 | DIASTOLIC BLOOD PRESSURE: 74 MMHG

## 2023-09-19 DIAGNOSIS — Z09 POSTOPERATIVE EXAMINATION: ICD-10-CM

## 2023-09-19 DIAGNOSIS — N76.0 ACUTE VAGINITIS: ICD-10-CM

## 2023-09-19 DIAGNOSIS — Z09 POSTOPERATIVE EXAMINATION: Primary | ICD-10-CM

## 2023-09-19 LAB
CANDIDA SPECIES: NEGATIVE
GARDNERELLA VAGINALIS: NEGATIVE
SOURCE: NORMAL
TRICHOMONAS: NEGATIVE

## 2023-09-19 PROCEDURE — 87480 CANDIDA DNA DIR PROBE: CPT

## 2023-09-19 PROCEDURE — 87660 TRICHOMONAS VAGIN DIR PROBE: CPT

## 2023-09-19 PROCEDURE — 87510 GARDNER VAG DNA DIR PROBE: CPT

## 2023-09-19 PROCEDURE — 99024 POSTOP FOLLOW-UP VISIT: CPT | Performed by: OBSTETRICS & GYNECOLOGY

## 2023-09-19 NOTE — PROGRESS NOTES
Postop Progress Note    Subjective    Mel Carrero presents to the office for postop follow up. Chief Complaint   Patient presents with    Post-Op Check     Patient is being seen for post op after Sacral spinous ligament fixation and anterior repair with Dermapure on 8/7/23. She states that she has been very tired but otherwise is feeling well. C/o vaginal odor with yellow discharge    Objective    Vitals:    09/19/23 1045   BP: 132/74     General: alert, cooperative and no distress  Incision: healing well; well healed and supported    Assessment  Doing well postoperatively. Vaginitis probe obtained    Plan  1. Continue any current medications  2. Wound care discussed  3. Pt is to increase activities as tolerated  4. Usual diet  5.  Follow up: as needed    Electronically signed by Marcello Banuelos DO on 9/19/2023 at 11:07 AM

## 2023-09-19 NOTE — TELEPHONE ENCOUNTER
Patient forgot to ask you when she was here. Patient states she was having side effects from the oxybutinin. Patient would like to know if there is something else she can try.

## 2023-09-20 ENCOUNTER — HOSPITAL ENCOUNTER (OUTPATIENT)
Age: 73
Setting detail: SPECIMEN
Discharge: HOME | End: 2023-09-20

## 2023-09-20 DIAGNOSIS — D75.1 POLYCYTHEMIA: ICD-10-CM

## 2023-09-20 LAB
BASOPHILS # BLD: 0.05 K/UL (ref 0–0.2)
BASOPHILS NFR BLD: 1 % (ref 0–2)
EOSINOPHIL # BLD: 0.19 K/UL (ref 0–0.44)
EOSINOPHILS RELATIVE PERCENT: 3 % (ref 1–4)
ERYTHROCYTE [DISTWIDTH] IN BLOOD BY AUTOMATED COUNT: 14.6 % (ref 11.8–14.4)
HCT VFR BLD AUTO: 44.1 % (ref 36.3–47.1)
HGB BLD-MCNC: 13.7 G/DL (ref 11.9–15.1)
IMM GRANULOCYTES # BLD AUTO: <0.03 K/UL (ref 0–0.3)
IMM GRANULOCYTES NFR BLD: 0 %
LYMPHOCYTES NFR BLD: 1.19 K/UL (ref 1.1–3.7)
LYMPHOCYTES RELATIVE PERCENT: 21 % (ref 24–43)
MCH RBC QN AUTO: 29.3 PG (ref 25.2–33.5)
MCHC RBC AUTO-ENTMCNC: 31.1 G/DL (ref 28.4–34.8)
MCV RBC AUTO: 94.2 FL (ref 82.6–102.9)
MONOCYTES NFR BLD: 0.63 K/UL (ref 0.1–1.2)
MONOCYTES NFR BLD: 11 % (ref 3–12)
NEUTROPHILS NFR BLD: 64 % (ref 36–65)
NEUTS SEG NFR BLD: 3.59 K/UL (ref 1.5–8.1)
NRBC BLD-RTO: 0 PER 100 WBC
PLATELET # BLD AUTO: 182 K/UL (ref 138–453)
PMV BLD AUTO: 11.1 FL (ref 8.1–13.5)
RBC # BLD AUTO: 4.68 M/UL (ref 3.95–5.11)
RBC # BLD: ABNORMAL 10*6/UL
WBC OTHER # BLD: 5.7 K/UL (ref 3.5–11.3)

## 2023-09-20 RX ORDER — 0.9 % SODIUM CHLORIDE 0.9 %
500 INTRAVENOUS SOLUTION INTRAVENOUS ONCE
Status: CANCELLED | OUTPATIENT
Start: 2023-09-20 | End: 2023-09-20

## 2023-09-20 RX ORDER — 0.9 % SODIUM CHLORIDE 0.9 %
250 INTRAVENOUS SOLUTION INTRAVENOUS ONCE
Status: CANCELLED | OUTPATIENT
Start: 2023-09-20 | End: 2023-09-20

## 2023-09-22 ENCOUNTER — HOSPITAL ENCOUNTER (OUTPATIENT)
Dept: INFUSION THERAPY | Age: 73
Discharge: HOME | End: 2023-09-22
Payer: MEDICARE

## 2023-09-22 VITALS
SYSTOLIC BLOOD PRESSURE: 129 MMHG | TEMPERATURE: 98.5 F | HEART RATE: 66 BPM | DIASTOLIC BLOOD PRESSURE: 58 MMHG | RESPIRATION RATE: 18 BRPM

## 2023-09-22 DIAGNOSIS — D45 POLYCYTHEMIA VERA (HCC): Primary | ICD-10-CM

## 2023-09-22 PROCEDURE — 99195 PHLEBOTOMY: CPT

## 2023-09-22 RX ORDER — 0.9 % SODIUM CHLORIDE 0.9 %
500 INTRAVENOUS SOLUTION INTRAVENOUS ONCE
OUTPATIENT
Start: 2023-09-22 | End: 2023-09-22

## 2023-09-22 RX ORDER — 0.9 % SODIUM CHLORIDE 0.9 %
250 INTRAVENOUS SOLUTION INTRAVENOUS ONCE
OUTPATIENT
Start: 2023-09-22 | End: 2023-09-22

## 2023-09-22 NOTE — PROGRESS NOTES
1100 Pt arrived to unit for 500 phlebotomy. VSS. Pt voices no c/o. 1104 Right AC accessed with 16 gauge closed bag system with immediate blood return. 1110 Pt voices no c/o. 1120 500 ml phlebotomy obtained. Pt denies c/o. Drinking water. /58, 60. 1136 Pt discharged ambulatory, gait steady.

## 2023-10-03 ENCOUNTER — TELEPHONE (OUTPATIENT)
Dept: PULMONOLOGY | Age: 73
End: 2023-10-03

## 2023-10-03 NOTE — TELEPHONE ENCOUNTER
Patient called and is not able to tolerate the new settings on her cpap. Indicating again the pressures are too high. Causing her mask to come off and a dry mouth. Currently on 18 max force and wanting it reduced to 15. Please advise.

## 2023-10-05 DIAGNOSIS — G47.33 OSA ON CPAP: Primary | ICD-10-CM

## 2023-10-05 NOTE — TELEPHONE ENCOUNTER
Yvette Mcduffie called in and left a voicemail that she had called the office on Monday asking to have the pressure turned down to 15 on her Cpap machine. She is having problems with the mask and she ends up taking the mask off and not using the machine. She is not eligible for new machine until 4/2024. She said if she can go back to a pressure of 15 she can tolerate that.

## 2023-10-05 NOTE — PROGRESS NOTES
Patient requesting decrease in the max pressure from 18 cm of water to 15 cm of water. The compliance data shows good compliance to using the CPAP machine and patient needing the high pressure of 18 cm of water. Her AHI was 13.3, higher than optimal.  In view of the patient's intolerance to the high pressure, we lowered to 15 cm of water and reevaluate the AHI in a month.     Electronically signed Elida Navarro MD  10/5/2023 7:30 PM

## 2023-10-05 NOTE — TELEPHONE ENCOUNTER
I called Lawrence Garcia and left a voicemail requesting a compliance download with AHI information to be sent ASAP. I then called Fausto Tristan back to let her know yes we did receive her message on Monday and Chrissy (SOULEYMANEN) had sent a note to the doctor and he sent back saying he needed additional information from the [facility]. I told her I had called and we are awaiting the information and then will send to Dr. Morena Shaikh for his recommendations. I asked if she had talked with the [employer] company and she said yes she has and they were not much help.

## 2023-10-06 ENCOUNTER — HOSPITAL ENCOUNTER (OUTPATIENT)
Age: 73
Setting detail: SPECIMEN
Discharge: HOME | End: 2023-10-06

## 2023-10-06 DIAGNOSIS — E11.21 TYPE 2 DIABETES MELLITUS WITH DIABETIC NEPHROPATHY, WITHOUT LONG-TERM CURRENT USE OF INSULIN (HCC): ICD-10-CM

## 2023-10-06 DIAGNOSIS — E03.9 HYPOTHYROIDISM, UNSPECIFIED TYPE: ICD-10-CM

## 2023-10-06 LAB
ALBUMIN SERPL-MCNC: 4.4 G/DL (ref 3.5–5.2)
ALBUMIN/GLOB SERPL: 2 {RATIO} (ref 1–2.5)
ALP SERPL-CCNC: 70 U/L (ref 35–104)
ALT SERPL-CCNC: 20 U/L (ref 10–35)
ANION GAP SERPL CALCULATED.3IONS-SCNC: 10 MMOL/L (ref 9–16)
AST SERPL-CCNC: 19 U/L (ref 10–35)
BILIRUB SERPL-MCNC: 0.7 MG/DL (ref 0–1.2)
BUN SERPL-MCNC: 22 MG/DL (ref 8–23)
CALCIUM SERPL-MCNC: 9.4 MG/DL (ref 8.6–10.4)
CHLORIDE SERPL-SCNC: 106 MMOL/L (ref 98–107)
CHOLEST SERPL-MCNC: 123 MG/DL (ref 0–199)
CHOLESTEROL/HDL RATIO: 2
CO2 SERPL-SCNC: 25 MMOL/L (ref 20–31)
CREAT SERPL-MCNC: 0.4 MG/DL (ref 0.5–0.9)
ERYTHROCYTE [DISTWIDTH] IN BLOOD BY AUTOMATED COUNT: 14.3 % (ref 11.8–14.4)
EST. AVERAGE GLUCOSE BLD GHB EST-MCNC: 111 MG/DL
GFR SERPL CREATININE-BSD FRML MDRD: >60 ML/MIN/1.73M2
GLUCOSE SERPL-MCNC: 102 MG/DL (ref 74–99)
HBA1C MFR BLD: 5.5 % (ref 4–6)
HCT VFR BLD AUTO: 43.8 % (ref 36.3–47.1)
HDLC SERPL-MCNC: 51 MG/DL
HGB BLD-MCNC: 13.7 G/DL (ref 11.9–15.1)
LDLC SERPL CALC-MCNC: 63 MG/DL (ref 0–100)
MCH RBC QN AUTO: 29.4 PG (ref 25.2–33.5)
MCHC RBC AUTO-ENTMCNC: 31.3 G/DL (ref 28.4–34.8)
MCV RBC AUTO: 94 FL (ref 82.6–102.9)
NRBC BLD-RTO: 0 PER 100 WBC
PLATELET # BLD AUTO: 179 K/UL (ref 138–453)
PMV BLD AUTO: 11.4 FL (ref 8.1–13.5)
POTASSIUM SERPL-SCNC: 4.3 MMOL/L (ref 3.7–5.3)
PROT SERPL-MCNC: 6.5 G/DL (ref 6.6–8.7)
RBC # BLD AUTO: 4.66 M/UL (ref 3.95–5.11)
SODIUM SERPL-SCNC: 141 MMOL/L (ref 136–145)
TRIGL SERPL-MCNC: 46 MG/DL (ref 0–149)
TSH SERPL DL<=0.05 MIU/L-ACNC: 5.43 UIU/ML (ref 0.27–4.2)
VLDLC SERPL CALC-MCNC: 9 MG/DL
WBC OTHER # BLD: 4.3 K/UL (ref 3.5–11.3)

## 2023-10-09 NOTE — TELEPHONE ENCOUNTER
Discharge instructions reviewed. Pt verbalized understanding. No questions, comments, or concerns at this time. Pt ambulatory upon discharge.    Order faxed to [facility] earlier this morning. I called and informed Jackeline Dixon that order was sent and need for compliance report in 1 month. Voices understanding.

## 2023-10-18 ENCOUNTER — OFFICE VISIT (OUTPATIENT)
Dept: ONCOLOGY | Age: 73
End: 2023-10-18
Payer: MEDICARE

## 2023-10-18 VITALS
HEIGHT: 70 IN | BODY MASS INDEX: 30.64 KG/M2 | TEMPERATURE: 98.4 F | RESPIRATION RATE: 18 BRPM | SYSTOLIC BLOOD PRESSURE: 144 MMHG | HEART RATE: 75 BPM | DIASTOLIC BLOOD PRESSURE: 76 MMHG | WEIGHT: 214 LBS

## 2023-10-18 DIAGNOSIS — D45 POLYCYTHEMIA VERA (HCC): Primary | ICD-10-CM

## 2023-10-18 PROCEDURE — G8417 CALC BMI ABV UP PARAM F/U: HCPCS | Performed by: INTERNAL MEDICINE

## 2023-10-18 PROCEDURE — 3077F SYST BP >= 140 MM HG: CPT | Performed by: INTERNAL MEDICINE

## 2023-10-18 PROCEDURE — 99214 OFFICE O/P EST MOD 30 MIN: CPT | Performed by: INTERNAL MEDICINE

## 2023-10-18 PROCEDURE — G8484 FLU IMMUNIZE NO ADMIN: HCPCS | Performed by: INTERNAL MEDICINE

## 2023-10-18 PROCEDURE — 3017F COLORECTAL CA SCREEN DOC REV: CPT | Performed by: INTERNAL MEDICINE

## 2023-10-18 PROCEDURE — G8399 PT W/DXA RESULTS DOCUMENT: HCPCS | Performed by: INTERNAL MEDICINE

## 2023-10-18 PROCEDURE — G8427 DOCREV CUR MEDS BY ELIG CLIN: HCPCS | Performed by: INTERNAL MEDICINE

## 2023-10-18 PROCEDURE — 1036F TOBACCO NON-USER: CPT | Performed by: INTERNAL MEDICINE

## 2023-10-18 PROCEDURE — 99211 OFF/OP EST MAY X REQ PHY/QHP: CPT | Performed by: INTERNAL MEDICINE

## 2023-10-18 PROCEDURE — 1123F ACP DISCUSS/DSCN MKR DOCD: CPT | Performed by: INTERNAL MEDICINE

## 2023-10-18 PROCEDURE — 3078F DIAST BP <80 MM HG: CPT | Performed by: INTERNAL MEDICINE

## 2023-10-18 PROCEDURE — 1090F PRES/ABSN URINE INCON ASSESS: CPT | Performed by: INTERNAL MEDICINE

## 2023-10-18 NOTE — PROGRESS NOTES
Reason for the visit:   No chief complaint on file. Pertinent Clinical Problems/ Treatments:  Polycythemia, initially diagnosed with polycythemia vera, but further workup suggested other possibilities,  Diagnosed with pulmonary hypertension, possibly explaining her symptoms and elevated hematocrit  Treated with periodic phlebotomies and aspirin as the phlebotomies helped her symptoms    Summary of the case/HPI :    She is 67-year-old patient who was diagnosed in 2004 with polycythemia vera, she was symptomatically the time in terms of headache and fatigue. She has been treated with therapeutic phlebotomies as well as aspirin since that time. Patient did not have any major events like thrombosis or stroke and did not qualify for cytoreductive therapy like hydroxyurea  She has been doing relatively well with the phlebotomy every couple of months to keep hematocrit is than 45  Further workup showed negative Julisa He 2 mutation and normal erythropoietin. Pulmonary workup suggested pulmonary hypertension. We organized sleep study and she didn't do it yet to exclude obstructive sleep apnea although this is suspected clinically. The patient phlebotomies helped significantly so we arranged for periodic phlebotomies when her hematocrit is above 45. Interim HX:    Patient is here today for follow-up visit for polycythemia. Patient has been doing phlebotomy every 4 months. She is clinically stable. She feels better after the phlebotomy with less headaches. No dizziness. No TIA or CVA-like symptoms. Patient had no cardiac events. At the present time she feels fine with no symptoms.        Past Medical History   has a past medical history of Activity intolerance, Allergic rhinitis due to other allergen, Autoimmune disorder ([address]), Depressive disorder, not elsewhere classified, Diabetes ([address]), Disease of blood and blood forming organ, Diverticulosis, DVT (deep venous thrombosis) ([address]), H/O echocardiogram,

## 2023-10-19 RX ORDER — LABETALOL 200 MG/1
200 TABLET, FILM COATED ORAL 2 TIMES DAILY
Qty: 180 TABLET | Refills: 3 | Status: SHIPPED | OUTPATIENT
Start: 2023-10-19

## 2023-11-15 ENCOUNTER — HOSPITAL ENCOUNTER (OUTPATIENT)
Age: 73
Setting detail: SPECIMEN
Discharge: HOME | End: 2023-11-15
Payer: MEDICARE

## 2023-11-15 ENCOUNTER — OFFICE VISIT (OUTPATIENT)
Dept: OBGYN | Age: 73
End: 2023-11-15
Payer: MEDICARE

## 2023-11-15 VITALS
BODY MASS INDEX: 30.35 KG/M2 | SYSTOLIC BLOOD PRESSURE: 124 MMHG | DIASTOLIC BLOOD PRESSURE: 70 MMHG | HEIGHT: 70 IN | WEIGHT: 212 LBS

## 2023-11-15 DIAGNOSIS — N81.11 CYSTOCELE, MIDLINE: ICD-10-CM

## 2023-11-15 DIAGNOSIS — N89.8 VAGINAL DISCHARGE: Primary | ICD-10-CM

## 2023-11-15 DIAGNOSIS — A49.9 BACTERIAL INFECTION: ICD-10-CM

## 2023-11-15 LAB
CANDIDA SPECIES: NEGATIVE
GARDNERELLA VAGINALIS: NEGATIVE
SOURCE: NORMAL
TRICHOMONAS: NEGATIVE

## 2023-11-15 PROCEDURE — 3074F SYST BP LT 130 MM HG: CPT

## 2023-11-15 PROCEDURE — 1123F ACP DISCUSS/DSCN MKR DOCD: CPT

## 2023-11-15 PROCEDURE — G8417 CALC BMI ABV UP PARAM F/U: HCPCS

## 2023-11-15 PROCEDURE — 3017F COLORECTAL CA SCREEN DOC REV: CPT

## 2023-11-15 PROCEDURE — G8399 PT W/DXA RESULTS DOCUMENT: HCPCS

## 2023-11-15 PROCEDURE — 1036F TOBACCO NON-USER: CPT

## 2023-11-15 PROCEDURE — 87510 GARDNER VAG DNA DIR PROBE: CPT

## 2023-11-15 PROCEDURE — G8427 DOCREV CUR MEDS BY ELIG CLIN: HCPCS

## 2023-11-15 PROCEDURE — 3078F DIAST BP <80 MM HG: CPT

## 2023-11-15 PROCEDURE — 87660 TRICHOMONAS VAGIN DIR PROBE: CPT

## 2023-11-15 PROCEDURE — 99213 OFFICE O/P EST LOW 20 MIN: CPT

## 2023-11-15 PROCEDURE — 1090F PRES/ABSN URINE INCON ASSESS: CPT

## 2023-11-15 PROCEDURE — 87480 CANDIDA DNA DIR PROBE: CPT

## 2023-11-15 PROCEDURE — G8484 FLU IMMUNIZE NO ADMIN: HCPCS

## 2023-11-20 ENCOUNTER — OFFICE VISIT (OUTPATIENT)
Dept: CARDIOLOGY | Age: 73
End: 2023-11-20
Payer: MEDICARE

## 2023-11-20 VITALS
WEIGHT: 210 LBS | HEIGHT: 70 IN | RESPIRATION RATE: 18 BRPM | BODY MASS INDEX: 30.06 KG/M2 | DIASTOLIC BLOOD PRESSURE: 73 MMHG | SYSTOLIC BLOOD PRESSURE: 132 MMHG | HEART RATE: 66 BPM | OXYGEN SATURATION: 97 %

## 2023-11-20 DIAGNOSIS — G47.33 OSA ON CPAP: ICD-10-CM

## 2023-11-20 DIAGNOSIS — I10 ESSENTIAL HYPERTENSION: ICD-10-CM

## 2023-11-20 DIAGNOSIS — I50.32 CHRONIC DIASTOLIC CONGESTIVE HEART FAILURE (HCC): Primary | ICD-10-CM

## 2023-11-20 DIAGNOSIS — I27.20 PULMONARY HTN (HCC): ICD-10-CM

## 2023-11-20 DIAGNOSIS — E78.2 MIXED HYPERLIPIDEMIA: ICD-10-CM

## 2023-11-20 DIAGNOSIS — I25.10 MILD CAD: ICD-10-CM

## 2023-11-20 PROCEDURE — G8399 PT W/DXA RESULTS DOCUMENT: HCPCS | Performed by: FAMILY MEDICINE

## 2023-11-20 PROCEDURE — 1123F ACP DISCUSS/DSCN MKR DOCD: CPT | Performed by: FAMILY MEDICINE

## 2023-11-20 PROCEDURE — 3075F SYST BP GE 130 - 139MM HG: CPT | Performed by: FAMILY MEDICINE

## 2023-11-20 PROCEDURE — 3078F DIAST BP <80 MM HG: CPT | Performed by: FAMILY MEDICINE

## 2023-11-20 PROCEDURE — 1090F PRES/ABSN URINE INCON ASSESS: CPT | Performed by: FAMILY MEDICINE

## 2023-11-20 PROCEDURE — 99213 OFFICE O/P EST LOW 20 MIN: CPT | Performed by: FAMILY MEDICINE

## 2023-11-20 PROCEDURE — G8484 FLU IMMUNIZE NO ADMIN: HCPCS | Performed by: FAMILY MEDICINE

## 2023-11-20 PROCEDURE — 99211 OFF/OP EST MAY X REQ PHY/QHP: CPT | Performed by: FAMILY MEDICINE

## 2023-11-20 PROCEDURE — 3017F COLORECTAL CA SCREEN DOC REV: CPT | Performed by: FAMILY MEDICINE

## 2023-11-20 PROCEDURE — 1036F TOBACCO NON-USER: CPT | Performed by: FAMILY MEDICINE

## 2023-11-20 PROCEDURE — G8427 DOCREV CUR MEDS BY ELIG CLIN: HCPCS | Performed by: FAMILY MEDICINE

## 2023-11-20 PROCEDURE — G8417 CALC BMI ABV UP PARAM F/U: HCPCS | Performed by: FAMILY MEDICINE

## 2023-11-20 NOTE — PATIENT INSTRUCTIONS
SURVEY:    You may be receiving a survey from [employer] regarding your visit today. Please complete the survey to enable us to provide the highest quality of care to you and your family. If you cannot score us a very good on any question, please call the office to discuss how we could have made your experience a very good one. Thank you.

## 2023-11-20 NOTE — PROGRESS NOTES
Kaylah Speaks am scribing for and in the presence of Demarcus Ignacio. Lupe RIGGS, MS, F.A.C.C..    Patient: Brooke Moya  : 1950  Date of Visit: 2023    REASON FOR VISIT / CONSULTATION: Congestive Heart Failure (HX: CHF. Pt states she is doing well. Denies: CP, Lightheaded/ dizziness, SOB, Palpitations. )    Dear MATT Orozco - CNP,    I had the pleasure of seeing your patient Brooke Moya in consultation today. As you know, Ms. Kisha Lobo is a 68 y.o. female with a history of polycythemia vera and pulmonary hypertension. Her echocardiogram on 2016 showed an ejection fraction of 55% with moderate pulmonary hypertension and severe left atrial enlargement (>40). Echocardiogram done on 2019 that showed EF of 55%. LV wall thickness is moderately increased. Moderate diastolic dysfunction is seen. Echo done on 2022 EF 55%. The left ventricular wall thickness is moderately increased. The patient has a sigmoid interventricular septum without evidence of outflow tract obstruction. Aortic leaflets show calcification without restriction of motion. Mild aortic insufficiency. Mild mitral and tricuspid regurgitation. Evidence of mild (grade I) diastolic dysfunction is seen. Stress test done on 2022 Equivocal myocardial perfusion study. There is a small/moderate perfusion defect of mild intensity in the lateral and inferolateral regions during stress imaging, which is most consistent with ischemia but may be due to artifact. The patient's Duke Treadmill score is 2, which correlates with an intermediate risk for significant coronary artery disease. Overall, these results are most consistent with a low/intermediate risk for significant coronary artery disease. Heart Cath done on 6/15/2023 showed mild coronary artery disease without any significant focal stenosis with a normal left ventricular end diastolic pressure (LVEDP). Ms. Kisha Lobo is here today for follow up.  She

## 2023-11-22 DIAGNOSIS — G47.33 OSA ON CPAP: Primary | ICD-10-CM

## 2023-12-04 ENCOUNTER — OFFICE VISIT (OUTPATIENT)
Dept: PULMONOLOGY | Age: 73
End: 2023-12-04
Payer: MEDICARE

## 2023-12-04 VITALS
WEIGHT: 209.8 LBS | RESPIRATION RATE: 16 BRPM | SYSTOLIC BLOOD PRESSURE: 132 MMHG | TEMPERATURE: 96.9 F | HEIGHT: 70 IN | BODY MASS INDEX: 30.03 KG/M2 | HEART RATE: 77 BPM | OXYGEN SATURATION: 97 % | DIASTOLIC BLOOD PRESSURE: 73 MMHG

## 2023-12-04 DIAGNOSIS — I27.20 PULMONARY HYPERTENSION (HCC): ICD-10-CM

## 2023-12-04 DIAGNOSIS — G47.33 OSA (OBSTRUCTIVE SLEEP APNEA): Primary | ICD-10-CM

## 2023-12-04 DIAGNOSIS — E66.09 OBESITY DUE TO EXCESS CALORIES, UNSPECIFIED OBESITY SEVERITY: ICD-10-CM

## 2023-12-04 DIAGNOSIS — D45 POLYCYTHEMIA VERA (HCC): ICD-10-CM

## 2023-12-04 DIAGNOSIS — R09.82 POST-NASAL DRIP: ICD-10-CM

## 2023-12-04 DIAGNOSIS — I50.32 CHRONIC DIASTOLIC (CONGESTIVE) HEART FAILURE (HCC): ICD-10-CM

## 2023-12-04 DIAGNOSIS — I82.412 DVT FEMORAL (DEEP VENOUS THROMBOSIS) WITH THROMBOPHLEBITIS, LEFT (HCC): ICD-10-CM

## 2023-12-04 PROCEDURE — 99213 OFFICE O/P EST LOW 20 MIN: CPT | Performed by: INTERNAL MEDICINE

## 2023-12-04 PROCEDURE — G8399 PT W/DXA RESULTS DOCUMENT: HCPCS | Performed by: INTERNAL MEDICINE

## 2023-12-04 PROCEDURE — G8484 FLU IMMUNIZE NO ADMIN: HCPCS | Performed by: INTERNAL MEDICINE

## 2023-12-04 PROCEDURE — G8417 CALC BMI ABV UP PARAM F/U: HCPCS | Performed by: INTERNAL MEDICINE

## 2023-12-04 PROCEDURE — 1036F TOBACCO NON-USER: CPT | Performed by: INTERNAL MEDICINE

## 2023-12-04 PROCEDURE — 1123F ACP DISCUSS/DSCN MKR DOCD: CPT | Performed by: INTERNAL MEDICINE

## 2023-12-04 PROCEDURE — 1090F PRES/ABSN URINE INCON ASSESS: CPT | Performed by: INTERNAL MEDICINE

## 2023-12-04 PROCEDURE — 3017F COLORECTAL CA SCREEN DOC REV: CPT | Performed by: INTERNAL MEDICINE

## 2023-12-04 PROCEDURE — 99214 OFFICE O/P EST MOD 30 MIN: CPT

## 2023-12-04 PROCEDURE — 3075F SYST BP GE 130 - 139MM HG: CPT | Performed by: INTERNAL MEDICINE

## 2023-12-04 PROCEDURE — G8427 DOCREV CUR MEDS BY ELIG CLIN: HCPCS | Performed by: INTERNAL MEDICINE

## 2023-12-04 PROCEDURE — 3078F DIAST BP <80 MM HG: CPT | Performed by: INTERNAL MEDICINE

## 2023-12-04 NOTE — PROGRESS NOTES
PULMONARY OP progress note        REFERRED BY: MATT Sultana CNP    REASON FOR CONSULTATION:  Chronic cough, pulmonary hypertension and sleep apnea    Patient is being seen in follow-up for-  1. LAYO (obstructive sleep apnea)    2. Post-nasal drip    3. Obesity due to excess calories, unspecified obesity severity    4. DVT femoral (deep venous thrombosis) with thrombophlebitis, left ([facility])    5. Pulmonary hypertension ([address])    6. Chronic diastolic (congestive) heart failure ([facility])    7. Polycythemia vera ([address])          HISTORY OF PRESENT ILLNESS:    Denied any hospitalization or ER visit for any breathing issues since last evaluated by me  Denied any hoarseness of the voice  Denied any shortness of breath or wheezing. No hemoptysis. Has been using her auto CPAP machine with improvement in the daytime sleepiness and fatigue and tiredness  Her compliance has improved significantly  Since her last visit, her compliance data was reviewed twice because she felt the pressure was high. Despite needing a CPAP of 18 cm of water pressure, patient remained have an AHI of 13.3. Because of her intolerance to the high pressure, it was lowered to 15 cm of water and then the AHI went up to 21.9.   Despite this high AHI, patient claims to have good quality of sleep without any daytime sleepiness or fatigue or tiredness  She also got her new mask recently and she is going to try it and keep me posted  Denied any significant postnasal discharge  No leg pain  No hemoptysis  No presyncope or syncope  No palpitations  No nasal discharge      PAST MEDICAL HISTORY:       Diagnosis Date    Activity intolerance 06/06/2018    Allergic rhinitis due to other allergen     Autoimmune disorder ([address])     Depressive disorder, not elsewhere classified     Diabetes ([address])     Disease of blood and blood forming organ 2003    Polycythemia    Diverticulosis     DVT (deep venous thrombosis) ([facility])     left leg    H/O

## 2023-12-04 NOTE — PATIENT INSTRUCTIONS
SURVEY:    You may be receiving a survey from [employer] regarding your visit today. Please complete the survey to enable us to provide the highest quality of care to you and your family. If you cannot score us a very good on any question, please call the office to discuss how we could have made your experience a very good one. Thank you.

## 2024-01-05 ENCOUNTER — HOSPITAL ENCOUNTER (OUTPATIENT)
Age: 74
Setting detail: SPECIMEN
Discharge: HOME | End: 2024-01-05

## 2024-01-05 DIAGNOSIS — E03.9 HYPOTHYROIDISM, UNSPECIFIED TYPE: ICD-10-CM

## 2024-01-05 LAB — TSH SERPL DL<=0.05 MIU/L-ACNC: 3 UIU/ML (ref 0.3–5)

## 2024-01-24 ENCOUNTER — HOSPITAL ENCOUNTER (OUTPATIENT)
Age: 74
Setting detail: SPECIMEN
Discharge: HOME | End: 2024-01-24

## 2024-01-24 DIAGNOSIS — D45 POLYCYTHEMIA VERA (HCC): ICD-10-CM

## 2024-01-24 LAB
BASOPHILS # BLD: 0.04 K/UL (ref 0–0.2)
BASOPHILS NFR BLD: 1 % (ref 0–2)
EOSINOPHIL # BLD: 0.11 K/UL (ref 0–0.44)
EOSINOPHILS RELATIVE PERCENT: 3 % (ref 1–4)
ERYTHROCYTE [DISTWIDTH] IN BLOOD BY AUTOMATED COUNT: 14.4 % (ref 11.8–14.4)
HCT VFR BLD AUTO: 43 % (ref 36.3–47.1)
HGB BLD-MCNC: 13.8 G/DL (ref 11.9–15.1)
IMM GRANULOCYTES # BLD AUTO: <0.03 K/UL (ref 0–0.3)
IMM GRANULOCYTES NFR BLD: 1 %
LYMPHOCYTES NFR BLD: 1.01 K/UL (ref 1.1–3.7)
LYMPHOCYTES RELATIVE PERCENT: 25 % (ref 24–43)
MCH RBC QN AUTO: 29.7 PG (ref 25.2–33.5)
MCHC RBC AUTO-ENTMCNC: 32.1 G/DL (ref 28.4–34.8)
MCV RBC AUTO: 92.5 FL (ref 82.6–102.9)
MONOCYTES NFR BLD: 0.46 K/UL (ref 0.1–1.2)
MONOCYTES NFR BLD: 11 % (ref 3–12)
NEUTROPHILS NFR BLD: 59 % (ref 36–65)
NEUTS SEG NFR BLD: 2.45 K/UL (ref 1.5–8.1)
NRBC BLD-RTO: 0 PER 100 WBC
PLATELET # BLD AUTO: 174 K/UL (ref 138–453)
PMV BLD AUTO: 11.6 FL (ref 8.1–13.5)
RBC # BLD AUTO: 4.65 M/UL (ref 3.95–5.11)
WBC OTHER # BLD: 4.1 K/UL (ref 3.5–11.3)

## 2024-01-25 DIAGNOSIS — D45 POLYCYTHEMIA VERA (HCC): Primary | ICD-10-CM

## 2024-01-28 ASSESSMENT — PATIENT HEALTH QUESTIONNAIRE - PHQ9
SUM OF ALL RESPONSES TO PHQ QUESTIONS 1-9: 0
SUM OF ALL RESPONSES TO PHQ9 QUESTIONS 1 & 2: 0
SUM OF ALL RESPONSES TO PHQ9 QUESTIONS 1 & 2: 0
2. FEELING DOWN, DEPRESSED OR HOPELESS: 0
SUM OF ALL RESPONSES TO PHQ QUESTIONS 1-9: 0
2. FEELING DOWN, DEPRESSED OR HOPELESS: NOT AT ALL
1. LITTLE INTEREST OR PLEASURE IN DOING THINGS: 0
1. LITTLE INTEREST OR PLEASURE IN DOING THINGS: NOT AT ALL

## 2024-01-30 ENCOUNTER — OFFICE VISIT (OUTPATIENT)
Dept: OBGYN | Age: 74
End: 2024-01-30
Payer: MEDICARE

## 2024-01-30 VITALS
WEIGHT: 205 LBS | BODY MASS INDEX: 29.35 KG/M2 | DIASTOLIC BLOOD PRESSURE: 80 MMHG | HEIGHT: 70 IN | SYSTOLIC BLOOD PRESSURE: 116 MMHG

## 2024-01-30 DIAGNOSIS — N81.11 CYSTOCELE, MIDLINE: Primary | ICD-10-CM

## 2024-01-30 PROCEDURE — 3074F SYST BP LT 130 MM HG: CPT | Performed by: OBSTETRICS & GYNECOLOGY

## 2024-01-30 PROCEDURE — 1090F PRES/ABSN URINE INCON ASSESS: CPT | Performed by: OBSTETRICS & GYNECOLOGY

## 2024-01-30 PROCEDURE — A4562 PESSARY, NON RUBBER,ANY TYPE: HCPCS | Performed by: OBSTETRICS & GYNECOLOGY

## 2024-01-30 PROCEDURE — G8417 CALC BMI ABV UP PARAM F/U: HCPCS | Performed by: OBSTETRICS & GYNECOLOGY

## 2024-01-30 PROCEDURE — 3079F DIAST BP 80-89 MM HG: CPT | Performed by: OBSTETRICS & GYNECOLOGY

## 2024-01-30 PROCEDURE — 1123F ACP DISCUSS/DSCN MKR DOCD: CPT | Performed by: OBSTETRICS & GYNECOLOGY

## 2024-01-30 PROCEDURE — G8399 PT W/DXA RESULTS DOCUMENT: HCPCS | Performed by: OBSTETRICS & GYNECOLOGY

## 2024-01-30 PROCEDURE — G8484 FLU IMMUNIZE NO ADMIN: HCPCS | Performed by: OBSTETRICS & GYNECOLOGY

## 2024-01-30 PROCEDURE — 99213 OFFICE O/P EST LOW 20 MIN: CPT | Performed by: OBSTETRICS & GYNECOLOGY

## 2024-01-30 PROCEDURE — G8427 DOCREV CUR MEDS BY ELIG CLIN: HCPCS | Performed by: OBSTETRICS & GYNECOLOGY

## 2024-01-30 PROCEDURE — 3017F COLORECTAL CA SCREEN DOC REV: CPT | Performed by: OBSTETRICS & GYNECOLOGY

## 2024-01-30 PROCEDURE — 1036F TOBACCO NON-USER: CPT | Performed by: OBSTETRICS & GYNECOLOGY

## 2024-01-30 NOTE — PROGRESS NOTES
Lizeth Cortes  2024  2:30 PM          Lizeth Cortes is a 73 y.o. female       The patient was seen. She has no chief complaints today. She has been fitted for a # 2; ring with support type pessary.  She states all of her symptoms that she had prior to the pessary have been relieved with its use.  She denies any vaginal bleeding.  She denies to any vaginal discharge or odor. Her bowels are regular and her voiding pattern is different.  Pt trying pessary as apical suspension failed.  Aware that amezquita or colpocleisis may be options.  Lot #[id number]     Blood pressure 116/80, height 1.778 m (5' 10\"), weight 93 kg (205 lb).    Chaperone for Intimate Exam  Chaperone was offered as part of the rooming process. Patient declined and agrees to continue with exam without a chaperone.  Chaperone: na        Abdomen: Soft and non-tender; good bowel sounds; no guarding, rebound or rigidity; no CVA tenderness bilaterally.    Extremities: No calf tenderness bilaterally. DTR 2/4 bilaterally. No edema.    Perineum/Speculum: There is any signs of infection; The vaginal vault is without any signs of erythema or erosion. There is no vaginal discharge or odor appreciated.    The pessary was cleansed and replaced without any problems and the patient tolerated the procedure well. T.O.S. Ointment was placed with the pessary to decrease discharge/odor.    Assessment:   Diagnosis Orders   1. Cystocele, midline          Chief Complaint   Patient presents with    Bladder Problem     Patient is being seen today to have pessary placed. She complains of vaginal bulge and urinary urgency.       Patient Active Problem List    Diagnosis Date Noted    Abnormal cardiovascular stress test 2022     Priority: High    Chronic diastolic HF (heart failure), NYHA class 2 (HCC) 2020     Priority: High    Moderate left ventricular hypertrophy 2019     Priority: High    DVT femoral (deep venous thrombosis) with

## 2024-02-01 ENCOUNTER — TELEPHONE (OUTPATIENT)
Dept: OBGYN | Age: 74
End: 2024-02-01

## 2024-02-02 NOTE — TELEPHONE ENCOUNTER
I checked in the [place] office and Sandy checked in the [place] office and neither location has a size 2 1/2 ring with support.  Would you like to get one ordered?    
Message sent to Jolene to order pessaries.  I attempted to call patient with Dr. Blake's recommendations and had to leave a message.  I encouraged patient to call the office on 2/19 prior to her visit to assure pessaries have arrived.  Patient to call with any further questions/concerns.  
Pt called in and stated every time she went to the bathroom she had to push up the pessary and  then yesterday at around 4pm it fell out completley. Pt stated this was a size 2 pessary.    Pt said it was mentioned that we could order a 2.5 to try?   What would you like to do for this patient ?   
Physician/Nursing/Family

## 2024-02-19 ENCOUNTER — OFFICE VISIT (OUTPATIENT)
Dept: OBGYN | Age: 74
End: 2024-02-19
Payer: MEDICARE

## 2024-02-19 DIAGNOSIS — N81.11 CYSTOCELE, MIDLINE: Primary | ICD-10-CM

## 2024-02-19 DIAGNOSIS — N95.2 ATROPHIC VAGINITIS: ICD-10-CM

## 2024-02-19 DIAGNOSIS — N81.6 RECTOCELE: ICD-10-CM

## 2024-02-19 PROCEDURE — 1123F ACP DISCUSS/DSCN MKR DOCD: CPT | Performed by: OBSTETRICS & GYNECOLOGY

## 2024-02-19 PROCEDURE — 99213 OFFICE O/P EST LOW 20 MIN: CPT | Performed by: OBSTETRICS & GYNECOLOGY

## 2024-02-19 PROCEDURE — G8427 DOCREV CUR MEDS BY ELIG CLIN: HCPCS | Performed by: OBSTETRICS & GYNECOLOGY

## 2024-02-19 PROCEDURE — 3017F COLORECTAL CA SCREEN DOC REV: CPT | Performed by: OBSTETRICS & GYNECOLOGY

## 2024-02-19 PROCEDURE — G8399 PT W/DXA RESULTS DOCUMENT: HCPCS | Performed by: OBSTETRICS & GYNECOLOGY

## 2024-02-19 PROCEDURE — G8484 FLU IMMUNIZE NO ADMIN: HCPCS | Performed by: OBSTETRICS & GYNECOLOGY

## 2024-02-19 PROCEDURE — 1036F TOBACCO NON-USER: CPT | Performed by: OBSTETRICS & GYNECOLOGY

## 2024-02-19 PROCEDURE — G8417 CALC BMI ABV UP PARAM F/U: HCPCS | Performed by: OBSTETRICS & GYNECOLOGY

## 2024-02-19 PROCEDURE — 1090F PRES/ABSN URINE INCON ASSESS: CPT | Performed by: OBSTETRICS & GYNECOLOGY

## 2024-02-19 NOTE — PROGRESS NOTES
10/16/2017    Pulmonary hypertension (HCC) 10/16/2017    Polycythemia 04/26/2017    Diverticulosis of large intestine without hemorrhage     Internal hemorrhoids     LAYO (obstructive sleep apnea) 12/14/2015    Essential hypertension     Hyperlipidemia     Type 2 diabetes mellitus with diabetic nephropathy, without long-term current use of insulin (HCC)     Hypothyroidism     Allergic rhinitis due to other allergen     Other seborrheic keratosis     Undiagnosed cardiac murmurs     Polycythemia vera (HCC) 04/02/2013         Plan:  1. Return to the office 10-12 weeks  2. Report any vaginal bleeding or discharge  3. Abstinence  4. Annual Follow-up reviewed with patient. They will schedule appointment    The patient, Lizeth Cortes is a 73 y.o. female, was seen with a total time spent of 20 minutes for the visit on this date of service by the E/M provider. The time component had both face to face and non face to face time spent in determining the total time component.  Counseling and education regarding her diagnosis listed below and her options regarding those diagnoses were also included in determining her time component.      Diagnosis Orders   1. Cystocele, midline        2. Rectocele        3. Atrophic vaginitis             The patient had her preventative health maintenance recommendations and follow-up reviewed with her at the completion of her visit.

## 2024-02-20 ENCOUNTER — TELEPHONE (OUTPATIENT)
Dept: OBGYN | Age: 74
End: 2024-02-20

## 2024-02-20 NOTE — TELEPHONE ENCOUNTER
She was seen yesterday, Hayden put a new one in and it caused new issues.    So Hayden told her she had a couple of other ideas but she wanted to check what thong has and discuss with you.

## 2024-02-20 NOTE — TELEPHONE ENCOUNTER
Pt called in today and was having complaints of pressure, and spotting. Felt like she had to go to the bathroom continuously. Pt was able to get the pessary out.    Hayden said she is going to check tomorrow in [place] to see what size pessary's are over there and then have Charlee call the patient to have her scheduled to come back in for an appt. I let the patient know I would contact you to remind Hayden and that you would be in contact with the patient for this.

## 2024-02-20 NOTE — TELEPHONE ENCOUNTER
This is the patient that Jolene ordered pessaries for.  I had looked in [place] for the sizes that Dr. Blake recommended and we did not have any.  Any update on the ones ordered?

## 2024-03-01 NOTE — TELEPHONE ENCOUNTER
The ring with support only comes in whole sizes.  I am sending a oval with support size 2, a size 2 donut, and dish 2 3/8 to the [place] office with Beverley.

## 2024-03-06 NOTE — TELEPHONE ENCOUNTER
Jer for pt, scheduled her for Monday, awaiting call back to make sure she can do that time and date

## 2024-03-20 ENCOUNTER — HOSPITAL ENCOUNTER (OUTPATIENT)
Age: 74
Setting detail: SPECIMEN
Discharge: HOME | End: 2024-03-20

## 2024-03-20 DIAGNOSIS — D45 POLYCYTHEMIA VERA (HCC): ICD-10-CM

## 2024-03-20 LAB
BASOPHILS # BLD: 0.05 K/UL (ref 0–0.2)
BASOPHILS NFR BLD: 1 % (ref 0–2)
EOSINOPHIL # BLD: 0.17 K/UL (ref 0–0.44)
EOSINOPHILS RELATIVE PERCENT: 4 % (ref 1–4)
ERYTHROCYTE [DISTWIDTH] IN BLOOD BY AUTOMATED COUNT: 14.1 % (ref 11.8–14.4)
HCT VFR BLD AUTO: 41.9 % (ref 36.3–47.1)
HGB BLD-MCNC: 13.6 G/DL (ref 11.9–15.1)
IMM GRANULOCYTES # BLD AUTO: <0.03 K/UL (ref 0–0.3)
IMM GRANULOCYTES NFR BLD: 0 %
LYMPHOCYTES NFR BLD: 1.03 K/UL (ref 1.1–3.7)
LYMPHOCYTES RELATIVE PERCENT: 24 % (ref 24–43)
MCH RBC QN AUTO: 29.8 PG (ref 25.2–33.5)
MCHC RBC AUTO-ENTMCNC: 32.5 G/DL (ref 28.4–34.8)
MCV RBC AUTO: 91.9 FL (ref 82.6–102.9)
MONOCYTES NFR BLD: 0.44 K/UL (ref 0.1–1.2)
MONOCYTES NFR BLD: 10 % (ref 3–12)
NEUTROPHILS NFR BLD: 61 % (ref 36–65)
NEUTS SEG NFR BLD: 2.68 K/UL (ref 1.5–8.1)
NRBC BLD-RTO: 0 PER 100 WBC
PLATELET # BLD AUTO: 166 K/UL (ref 138–453)
PMV BLD AUTO: 11.1 FL (ref 8.1–13.5)
RBC # BLD AUTO: 4.56 M/UL (ref 3.95–5.11)
WBC OTHER # BLD: 4.4 K/UL (ref 3.5–11.3)

## 2024-04-05 ENCOUNTER — HOSPITAL ENCOUNTER (OUTPATIENT)
Age: 74
Setting detail: SPECIMEN
Discharge: HOME | End: 2024-04-05

## 2024-04-05 DIAGNOSIS — E11.21 TYPE 2 DIABETES MELLITUS WITH DIABETIC NEPHROPATHY, WITHOUT LONG-TERM CURRENT USE OF INSULIN (HCC): ICD-10-CM

## 2024-04-05 DIAGNOSIS — I10 ESSENTIAL HYPERTENSION: ICD-10-CM

## 2024-04-05 DIAGNOSIS — D45 POLYCYTHEMIA VERA (HCC): ICD-10-CM

## 2024-04-05 LAB
ALBUMIN SERPL-MCNC: 4.2 G/DL (ref 3.5–5.2)
ALBUMIN/GLOB SERPL: 2 {RATIO} (ref 1–2.5)
ALP SERPL-CCNC: 68 U/L (ref 35–104)
ALT SERPL-CCNC: 21 U/L (ref 10–35)
ANION GAP SERPL CALCULATED.3IONS-SCNC: 12 MMOL/L (ref 9–16)
AST SERPL-CCNC: 19 U/L (ref 10–35)
BILIRUB SERPL-MCNC: 1.1 MG/DL (ref 0–1.2)
BUN SERPL-MCNC: 24 MG/DL (ref 8–23)
CALCIUM SERPL-MCNC: 9.5 MG/DL (ref 8.6–10.4)
CHLORIDE SERPL-SCNC: 106 MMOL/L (ref 98–107)
CO2 SERPL-SCNC: 23 MMOL/L (ref 20–31)
CREAT SERPL-MCNC: 0.5 MG/DL (ref 0.5–0.9)
CREAT UR-MCNC: 77.1 MG/DL (ref 28–217)
ERYTHROCYTE [DISTWIDTH] IN BLOOD BY AUTOMATED COUNT: 14.3 % (ref 11.8–14.4)
EST. AVERAGE GLUCOSE BLD GHB EST-MCNC: 151 MG/DL
GFR SERPL CREATININE-BSD FRML MDRD: >90 ML/MIN/1.73M2
GLUCOSE SERPL-MCNC: 118 MG/DL (ref 74–99)
HBA1C MFR BLD: 6.9 % (ref 4–6)
HCT VFR BLD AUTO: 42 % (ref 36.3–47.1)
HGB BLD-MCNC: 13.5 G/DL (ref 11.9–15.1)
MCH RBC QN AUTO: 30.3 PG (ref 25.2–33.5)
MCHC RBC AUTO-ENTMCNC: 32.1 G/DL (ref 28.4–34.8)
MCV RBC AUTO: 94.4 FL (ref 82.6–102.9)
MICROALBUMIN UR-MCNC: 26 MG/L (ref 0–20)
MICROALBUMIN/CREAT UR-RTO: 34 MCG/MG CREAT (ref 0–25)
NRBC BLD-RTO: 0 PER 100 WBC
PLATELET # BLD AUTO: 175 K/UL (ref 138–453)
PMV BLD AUTO: 11.3 FL (ref 8.1–13.5)
POTASSIUM SERPL-SCNC: 4.3 MMOL/L (ref 3.7–5.3)
PROT SERPL-MCNC: 6.3 G/DL (ref 6.6–8.7)
RBC # BLD AUTO: 4.45 M/UL (ref 3.95–5.11)
SODIUM SERPL-SCNC: 141 MMOL/L (ref 136–145)
WBC OTHER # BLD: 4.4 K/UL (ref 3.5–11.3)

## 2024-04-09 PROBLEM — I82.412 DVT FEMORAL (DEEP VENOUS THROMBOSIS) WITH THROMBOPHLEBITIS, LEFT (HCC): Status: RESOLVED | Noted: 2019-08-05 | Resolved: 2024-04-09

## 2024-05-10 ENCOUNTER — HOSPITAL ENCOUNTER (OUTPATIENT)
Age: 74
Setting detail: SPECIMEN
Discharge: HOME | End: 2024-05-10

## 2024-05-10 DIAGNOSIS — D45 POLYCYTHEMIA VERA (HCC): ICD-10-CM

## 2024-05-10 LAB
BASOPHILS # BLD: 0.04 K/UL (ref 0–0.2)
BASOPHILS NFR BLD: 1 % (ref 0–2)
EOSINOPHIL # BLD: 0.12 K/UL (ref 0–0.44)
EOSINOPHILS RELATIVE PERCENT: 2 % (ref 1–4)
ERYTHROCYTE [DISTWIDTH] IN BLOOD BY AUTOMATED COUNT: 13.9 % (ref 11.8–14.4)
HCT VFR BLD AUTO: 42.3 % (ref 36.3–47.1)
HGB BLD-MCNC: 13.5 G/DL (ref 11.9–15.1)
IMM GRANULOCYTES # BLD AUTO: <0.03 K/UL (ref 0–0.3)
IMM GRANULOCYTES NFR BLD: 0 %
LYMPHOCYTES NFR BLD: 1.02 K/UL (ref 1.1–3.7)
LYMPHOCYTES RELATIVE PERCENT: 17 % (ref 24–43)
MCH RBC QN AUTO: 29.9 PG (ref 25.2–33.5)
MCHC RBC AUTO-ENTMCNC: 31.9 G/DL (ref 28.4–34.8)
MCV RBC AUTO: 93.6 FL (ref 82.6–102.9)
MONOCYTES NFR BLD: 0.51 K/UL (ref 0.1–1.2)
MONOCYTES NFR BLD: 9 % (ref 3–12)
NEUTROPHILS NFR BLD: 71 % (ref 36–65)
NEUTS SEG NFR BLD: 4.22 K/UL (ref 1.5–8.1)
NRBC BLD-RTO: 0 PER 100 WBC
PLATELET # BLD AUTO: 173 K/UL (ref 138–453)
PMV BLD AUTO: 11.3 FL (ref 8.1–13.5)
RBC # BLD AUTO: 4.52 M/UL (ref 3.95–5.11)
WBC OTHER # BLD: 5.9 K/UL (ref 3.5–11.3)

## 2024-06-05 ENCOUNTER — OFFICE VISIT (OUTPATIENT)
Dept: ONCOLOGY | Age: 74
End: 2024-06-05
Payer: MEDICARE

## 2024-06-05 VITALS
SYSTOLIC BLOOD PRESSURE: 138 MMHG | RESPIRATION RATE: 18 BRPM | WEIGHT: 200 LBS | DIASTOLIC BLOOD PRESSURE: 85 MMHG | HEIGHT: 70 IN | BODY MASS INDEX: 28.63 KG/M2 | TEMPERATURE: 98.3 F | HEART RATE: 69 BPM

## 2024-06-05 DIAGNOSIS — D45 POLYCYTHEMIA VERA (HCC): Primary | ICD-10-CM

## 2024-06-05 PROCEDURE — G8417 CALC BMI ABV UP PARAM F/U: HCPCS | Performed by: INTERNAL MEDICINE

## 2024-06-05 PROCEDURE — 3075F SYST BP GE 130 - 139MM HG: CPT | Performed by: INTERNAL MEDICINE

## 2024-06-05 PROCEDURE — 99214 OFFICE O/P EST MOD 30 MIN: CPT | Performed by: INTERNAL MEDICINE

## 2024-06-05 PROCEDURE — 1036F TOBACCO NON-USER: CPT | Performed by: INTERNAL MEDICINE

## 2024-06-05 PROCEDURE — 99211 OFF/OP EST MAY X REQ PHY/QHP: CPT | Performed by: INTERNAL MEDICINE

## 2024-06-05 PROCEDURE — 3017F COLORECTAL CA SCREEN DOC REV: CPT | Performed by: INTERNAL MEDICINE

## 2024-06-05 PROCEDURE — 1090F PRES/ABSN URINE INCON ASSESS: CPT | Performed by: INTERNAL MEDICINE

## 2024-06-05 PROCEDURE — 3079F DIAST BP 80-89 MM HG: CPT | Performed by: INTERNAL MEDICINE

## 2024-06-05 PROCEDURE — G8427 DOCREV CUR MEDS BY ELIG CLIN: HCPCS | Performed by: INTERNAL MEDICINE

## 2024-06-05 PROCEDURE — 1123F ACP DISCUSS/DSCN MKR DOCD: CPT | Performed by: INTERNAL MEDICINE

## 2024-06-05 PROCEDURE — G8399 PT W/DXA RESULTS DOCUMENT: HCPCS | Performed by: INTERNAL MEDICINE

## 2024-06-08 NOTE — PROGRESS NOTES
Diverticulosis, DVT (deep venous thrombosis) ([facility]), H/O echocardiogram, History of blood transfusion, Hypertension, LAYO (obstructive sleep apnea), Other and unspecified hyperlipidemia, Other seborrheic keratosis, Overactive bladder, Pain in lower limb, Peripheral vascular disease (HCC), Polycythemia vera(238.4), Polycythemia vera(238.4), Supraventricular tachycardia (HCC), Type 2 diabetes mellitus without complication (HCC), Type II or unspecified type diabetes mellitus without mention of complication, not stated as uncontrolled, Type II or unspecified type diabetes mellitus without mention of complication, not stated as uncontrolled, Undiagnosed cardiac murmurs, Unspecified essential hypertension, Unspecified hypothyroidism, Varicose veins of left lower extremity with complications, Varicose veins of left lower extremity with pain, Varicosities, Venous (peripheral) insufficiency, and Venous insufficiency of both lower extremities.    Surgical History   has a past surgical history that includes Cholecystectomy; Hysterectomy; Leg Surgery; Colonoscopy (10/10/2016); other surgical history (Left, 06/06/2018); other surgical history (Left, 03/20/2019); Excision/Biopsy (Left, 11/19/2020); back surgery (Left, 11/19/2020); other surgical history (08/13/2021); Cardiac catheterization (06/2023); Vagina surgery (N/A, 08/07/2023); Vagina surgery (N/A, 08/07/2023); Dilation & curettage (1984); and Cataract extraction, bilateral (Bilateral, 03/2024).    Family History  Family History   Problem Relation Age of Onset    Diabetes Mother     High Blood Pressure Mother     Stroke Mother     Cancer Father     Hearing Loss Father     High Blood Pressure Father     Hypertension Father        Home Medications  has a current medication list which includes the following prescription(s): famotidine, rosuvastatin, freestyle lite, freestyle lancets, lotemax sm, losartan, metformin, fluticasone, levothyroxine, amlodipine, albuterol sulfate hfa,

## 2024-06-17 ENCOUNTER — OFFICE VISIT (OUTPATIENT)
Dept: PULMONOLOGY | Age: 74
End: 2024-06-17
Payer: MEDICARE

## 2024-06-17 VITALS
WEIGHT: 197.7 LBS | HEART RATE: 72 BPM | HEIGHT: 70 IN | SYSTOLIC BLOOD PRESSURE: 123 MMHG | TEMPERATURE: 97.6 F | BODY MASS INDEX: 28.3 KG/M2 | DIASTOLIC BLOOD PRESSURE: 80 MMHG | RESPIRATION RATE: 16 BRPM | OXYGEN SATURATION: 97 %

## 2024-06-17 DIAGNOSIS — D45 POLYCYTHEMIA VERA (HCC): ICD-10-CM

## 2024-06-17 DIAGNOSIS — R09.82 POST-NASAL DRIP: ICD-10-CM

## 2024-06-17 DIAGNOSIS — I27.20 PULMONARY HYPERTENSION (HCC): ICD-10-CM

## 2024-06-17 DIAGNOSIS — I82.412 DVT FEMORAL (DEEP VENOUS THROMBOSIS) WITH THROMBOPHLEBITIS, LEFT (HCC): ICD-10-CM

## 2024-06-17 DIAGNOSIS — E66.09 OBESITY DUE TO EXCESS CALORIES, UNSPECIFIED OBESITY SEVERITY: ICD-10-CM

## 2024-06-17 DIAGNOSIS — I50.32 CHRONIC DIASTOLIC (CONGESTIVE) HEART FAILURE (HCC): ICD-10-CM

## 2024-06-17 DIAGNOSIS — G47.33 OSA (OBSTRUCTIVE SLEEP APNEA): Primary | ICD-10-CM

## 2024-06-17 PROCEDURE — 1090F PRES/ABSN URINE INCON ASSESS: CPT | Performed by: INTERNAL MEDICINE

## 2024-06-17 PROCEDURE — 3017F COLORECTAL CA SCREEN DOC REV: CPT | Performed by: INTERNAL MEDICINE

## 2024-06-17 PROCEDURE — 1036F TOBACCO NON-USER: CPT | Performed by: INTERNAL MEDICINE

## 2024-06-17 PROCEDURE — 3079F DIAST BP 80-89 MM HG: CPT | Performed by: INTERNAL MEDICINE

## 2024-06-17 PROCEDURE — 99214 OFFICE O/P EST MOD 30 MIN: CPT

## 2024-06-17 PROCEDURE — 1123F ACP DISCUSS/DSCN MKR DOCD: CPT | Performed by: INTERNAL MEDICINE

## 2024-06-17 PROCEDURE — 3074F SYST BP LT 130 MM HG: CPT | Performed by: INTERNAL MEDICINE

## 2024-06-17 PROCEDURE — G8427 DOCREV CUR MEDS BY ELIG CLIN: HCPCS | Performed by: INTERNAL MEDICINE

## 2024-06-17 PROCEDURE — G8399 PT W/DXA RESULTS DOCUMENT: HCPCS | Performed by: INTERNAL MEDICINE

## 2024-06-17 PROCEDURE — 99214 OFFICE O/P EST MOD 30 MIN: CPT | Performed by: INTERNAL MEDICINE

## 2024-06-17 PROCEDURE — G8417 CALC BMI ABV UP PARAM F/U: HCPCS | Performed by: INTERNAL MEDICINE

## 2024-06-17 NOTE — PROGRESS NOTES
lymphadenopathy.  Extremities: Does not have any bilateral lower extremity edema and patient did wear stockings, no ulcerations, tenderness, varicosities or erythema.  Muscle size, tone and strength are normal.  No involuntary movements are noted.   Skin:  Warm and dry.  Good color, turgor and pigmentation. No lesions or scars.  No cyanosis or clubbing  Neurological/Psychiatric: The patient's general behavior, level of consciousness, thought content and emotional status is normal.          DATA:     Ventilation/perfusion scan was a very low probability for pulmonary embolism  Chest x-ray done simultaneously did not show any acute process  Echocardiogram showed grade 2 diastolic dysfunction without pulmonary artery hypertension  Polysomnogram showed evidence of mild sleep apnea with an AHI of 7 without any leg movements  Her last hematocrit was 43.5    Her compliance was 87 % for usage of more than 4 hours with auto CPAP machine with pressure range of 5 to 15 cm of water with a maximum pressure of 15 cm of water with an AHI of 8    Nocturnal recording oximetry on 8/13/2020 did not show any significant desaturation during sleep with CPAP therapy    Her compliance data showed 67 % compliance for usage of more than 4 hours with an auto CPAP machine of 5 to 15 cm of water with a maximum CPAP pressure of 15 cm of water.  The AHI data was not available in the provided information    Her compliance data for the month of September to October showed 77% compliance for usage of more than 4 hours with an auto CPAP machine pressure range of 5 to 18 cm of water with a maximum pressure of 17.9 cm of water with an AHI of 13.3    Her compliance data showed a compliance of 80 % for usage of more than 4 hours with an auto CPAP machine set 10 to 15 cm of water with a max pressure of 15 cm of water with an AHI of 14.1    IMPRESSION:   1. LAYO (obstructive sleep apnea)    2. Post-nasal drip    3. Obesity due to excess calories,

## 2024-06-19 ENCOUNTER — OFFICE VISIT (OUTPATIENT)
Dept: VASCULAR SURGERY | Age: 74
End: 2024-06-19
Payer: MEDICARE

## 2024-06-19 VITALS
WEIGHT: 197.9 LBS | RESPIRATION RATE: 18 BRPM | SYSTOLIC BLOOD PRESSURE: 122 MMHG | TEMPERATURE: 97.8 F | HEART RATE: 73 BPM | HEIGHT: 70 IN | DIASTOLIC BLOOD PRESSURE: 66 MMHG | BODY MASS INDEX: 28.33 KG/M2

## 2024-06-19 DIAGNOSIS — M79.604 RIGHT LEG PAIN: ICD-10-CM

## 2024-06-19 DIAGNOSIS — I87.2 VENOUS INSUFFICIENCY OF BOTH LOWER EXTREMITIES: ICD-10-CM

## 2024-06-19 DIAGNOSIS — I83.811 VARICOSE VEINS OF LEG WITH PAIN, RIGHT: Primary | ICD-10-CM

## 2024-06-19 PROCEDURE — 1036F TOBACCO NON-USER: CPT | Performed by: INTERNAL MEDICINE

## 2024-06-19 PROCEDURE — G8417 CALC BMI ABV UP PARAM F/U: HCPCS | Performed by: INTERNAL MEDICINE

## 2024-06-19 PROCEDURE — 3017F COLORECTAL CA SCREEN DOC REV: CPT | Performed by: INTERNAL MEDICINE

## 2024-06-19 PROCEDURE — 99214 OFFICE O/P EST MOD 30 MIN: CPT | Performed by: INTERNAL MEDICINE

## 2024-06-19 PROCEDURE — 3074F SYST BP LT 130 MM HG: CPT | Performed by: INTERNAL MEDICINE

## 2024-06-19 PROCEDURE — G8399 PT W/DXA RESULTS DOCUMENT: HCPCS | Performed by: INTERNAL MEDICINE

## 2024-06-19 PROCEDURE — 3078F DIAST BP <80 MM HG: CPT | Performed by: INTERNAL MEDICINE

## 2024-06-19 PROCEDURE — 1090F PRES/ABSN URINE INCON ASSESS: CPT | Performed by: INTERNAL MEDICINE

## 2024-06-19 PROCEDURE — 1123F ACP DISCUSS/DSCN MKR DOCD: CPT | Performed by: INTERNAL MEDICINE

## 2024-06-19 PROCEDURE — G8427 DOCREV CUR MEDS BY ELIG CLIN: HCPCS | Performed by: INTERNAL MEDICINE

## 2024-06-19 NOTE — PROGRESS NOTES
Lizeth Cortes was seen on 6/19/2024 for   Chief Complaint   Patient presents with    Varicose Veins     1 year follow up. Patient reports sharp pains in right knee last week but states has since stopped. Wears compression to avoid leg swelling.                                REVIEW OF SYSTEMS    Constitutional Weight: present, A, Fatigue: absent Fever: absent   HEENT Ears: normal,Visual disturbance: absent Sore throat: absent,    Respiratory Shortness of breath: absent, Cough: absent;, Snoring: absent   Cardivascular Chest pain: absent,  Leg pain: present,Leg swelling:absent, Non-healing wound:absent    GI Diarrhea: absent, Abdominal Pain: absent    Urinary frequency: present, Urinary incontinence: absent   Musculoskeletal Neck pain: absent, Back pain: absent, Restless Leg:absent     Dermatological Hair loss: absent, Skin changes: absent   Neurological  Sudden Loss of Vision in one eye:absent, Slurred Speech:absent, Weakness on one side of body: absent,Headache: absent   Psychiatric Anxiety: absent, Depression: absent   Hematologic Abnormal bleeding: absent,

## 2024-06-19 NOTE — PATIENT INSTRUCTIONS
SURVEY:    Thank you for allowing us to care for you today.    You may be receiving a survey from [facility] regarding your visit today- electronically or via mail.      Please help us by completing the survey as this will provide the needed feedback to ensure we are providing the very best care for you and your family.    If you cannot score us a very good on any question, please call the office to discuss how we could have made your experience a very good one.    Thank you.       STAFF:    Michaelle Carrion      CLINICAL STAFF:    Silvana Moreno CMA

## 2024-06-19 NOTE — PROGRESS NOTES
Lizeth Cortes was seen on 6/19/2024 for   Chief Complaint   Patient presents with    Varicose Veins     1 year follow up. Patient reports sharp pains in right knee last week but states has since stopped. Wears compression to avoid leg swelling.    .5/9/18 First recent vascular visit. PCP Dr. Kristopher Gustafson. R leg GSV laser Virgil Taylor, 6/2/14, followed by L 4/30/15 laser c stab phlebectomy. Had pain, swelling, no skin breakdown, hyperpigmentation. Initial concern was for dvt, but studies were neg. Mom had varicose veins. Not treated. On feet at dept store all day. Legs felt btr after laser, still wore stockings, has developed sx last year. Both legs had swelling and pain. Thigh veins prominent.   UPDATE 8/22/18 oN 6/6/18 had LGSV foam, then 6/13/18 RGSV. 6/27/18 DUS showed LPTV DVT, with bilateral GSV occlusive thrombus. Still has L thigh varicosity patent. Legs feel better. Still wearing support stockings. On her feet a lot.  UPDATE 2/19/19 Right leg is much better. Has developed a small thigh varicosity which is mildly tender. Left leg had residual branch which was not sclerosed on 1st procedure, with possible plan to address later, probably with foam. Still has some thigh pain.  UPDATE 4/3/19 Since last visit had 3/20/19 left lateral thigh polidocanol foam therapy using butterfly needle. Mild soreness post procedure with hyperpigmentation. Duplex 3/27/19 showed that veins in lateral left thigh and in knee area are clotted with no DVT.  UPDATE 10/9/19 Legs have been doing well. Not painful. Notices left gaiter hyperpigmentation. Spider veins right medial foot. Lateral foot has varicose veins. Wearing compression stockings daily.  UPDATE 7/8/20 Almost to 51th wedding anniversary. Had 2 months of left posterior knee pain, popliteal fossa area. Has gone away. No changes on foot or toes. Painful veins have resolved. Still has spider veins.  UPDATE 7/14/21 Now  52 yrs (7/11) Right leg aches at night.

## 2024-07-22 NOTE — ANESTHESIA POSTPROCEDURE EVALUATION
Department of Anesthesiology  Postprocedure Note    Patient: Darrian Cancer  MRN: [id number]  YOB: 1950  Date of evaluation: 8/7/2023      Procedure Summary     Date: 08/07/23 Room / Location: [address] [place] [address]    Anesthesia Start: 4155 Anesthesia Stop: 1143    Procedures:       VAGINAL SUSPENSION: SACRAL SPINOUS LIGAMENT FIXATION WITH [facility]      VAGINAL ANTERIOR REPAIR Diagnosis:       Vaginal vault prolapse      (VAGINAL VAULT PROLAPSE, CYSTOCELE)    Surgeons: Aida Bennett DO Responsible Provider: Jerrlyn Osgood, APRN - CRNA    Anesthesia Type: MAC ASA Status: 3          Anesthesia Type: No value filed.     Saw Phase I: Saw Score: 10    Saw Phase II: Saw Score: 10      Anesthesia Post Evaluation    Patient location during evaluation: PACU  Patient participation: complete - patient participated  Level of consciousness: awake and alert  Pain score: 1  Airway patency: patent  Nausea & Vomiting: no vomiting and no nausea  Complications: no  Cardiovascular status: blood pressure returned to baseline  Respiratory status: acceptable  Hydration status: stable  Multimodal analgesia pain management approach  Pain management: adequate
None

## 2024-08-15 NOTE — PLAN OF CARE
Continue warfarin 2 mg daily except 1 mg Mondays and Fridays. INR in 1 week  
Problem: Chronic Conditions and Co-morbidities  Goal: Patient's chronic conditions and co-morbidity symptoms are monitored and maintained or improved  Outcome: Adequate for Discharge
.  LABS:                         13.4   6.84  )-----------( 242      ( 15 Aug 2024 14:40 )             40.4     08-15    142  |  102  |  19  ----------------------------<  96  3.3<L>   |  25  |  0.9    Ca    9.5      15 Aug 2024 14:40    TPro  6.8  /  Alb  4.6  /  TBili  0.7  /  DBili  x   /  AST  17  /  ALT  10  /  AlkPhos  99  08-15      Urinalysis Basic - ( 15 Aug 2024 17:22 )    Color: Yellow / Appearance: Clear / S.013 / pH: x  Gluc: x / Ketone: Trace mg/dL  / Bili: Negative / Urobili: 1.0 mg/dL   Blood: x / Protein: Negative mg/dL / Nitrite: Negative   Leuk Esterase: Large / RBC: 5 /HPF / WBC 38 /HPF   Sq Epi: x / Non Sq Epi: 5 /HPF / Bacteria: Negative /HPF      RADIOLOGY, EKG & ADDITIONAL TESTS: Reviewed.

## 2024-10-10 RX ORDER — LABETALOL 200 MG/1
200 TABLET, FILM COATED ORAL 2 TIMES DAILY
Qty: 180 TABLET | Refills: 0 | Status: SHIPPED | OUTPATIENT
Start: 2024-10-10

## 2024-10-25 ENCOUNTER — HOSPITAL ENCOUNTER (OUTPATIENT)
Age: 74
Setting detail: SPECIMEN
Discharge: HOME | End: 2024-10-25

## 2024-10-26 LAB
BASOPHILS # BLD: 0.05 K/UL (ref 0–0.2)
BASOPHILS NFR BLD: 1 % (ref 0–2)
EOSINOPHIL # BLD: 0.1 K/UL (ref 0–0.44)
EOSINOPHILS RELATIVE PERCENT: 2 % (ref 1–4)
ERYTHROCYTE [DISTWIDTH] IN BLOOD BY AUTOMATED COUNT: 13.7 % (ref 11.8–14.4)
HCT VFR BLD AUTO: 43.8 % (ref 36.3–47.1)
HGB BLD-MCNC: 13.9 G/DL (ref 11.9–15.1)
IMM GRANULOCYTES # BLD AUTO: <0.03 K/UL (ref 0–0.3)
IMM GRANULOCYTES NFR BLD: 0 %
LYMPHOCYTES NFR BLD: 1.02 K/UL (ref 1.1–3.7)
LYMPHOCYTES RELATIVE PERCENT: 20 % (ref 24–43)
MCH RBC QN AUTO: 30.2 PG (ref 25.2–33.5)
MCHC RBC AUTO-ENTMCNC: 31.7 G/DL (ref 28.4–34.8)
MCV RBC AUTO: 95.2 FL (ref 82.6–102.9)
MONOCYTES NFR BLD: 0.58 K/UL (ref 0.1–1.2)
MONOCYTES NFR BLD: 11 % (ref 3–12)
NEUTROPHILS NFR BLD: 66 % (ref 36–65)
NEUTS SEG NFR BLD: 3.44 K/UL (ref 1.5–8.1)
NRBC BLD-RTO: 0 PER 100 WBC
PLATELET # BLD AUTO: 188 K/UL (ref 138–453)
PMV BLD AUTO: 11.5 FL (ref 8.1–13.5)
RBC # BLD AUTO: 4.6 M/UL (ref 3.95–5.11)
WBC OTHER # BLD: 5.2 K/UL (ref 3.5–11.3)

## 2024-11-06 ENCOUNTER — HOSPITAL ENCOUNTER (OUTPATIENT)
Dept: WOMENS IMAGING | Age: 74
Discharge: HOME | End: 2024-11-08
Payer: MEDICARE

## 2024-11-06 DIAGNOSIS — Z12.31 SCREENING MAMMOGRAM, ENCOUNTER FOR: ICD-10-CM

## 2024-11-06 PROCEDURE — 77063 BREAST TOMOSYNTHESIS BI: CPT

## 2024-11-11 ENCOUNTER — HOSPITAL ENCOUNTER (OUTPATIENT)
Age: 74
Setting detail: SPECIMEN
Discharge: HOME | End: 2024-11-11

## 2024-11-11 DIAGNOSIS — E11.21 TYPE 2 DIABETES MELLITUS WITH DIABETIC NEPHROPATHY, WITHOUT LONG-TERM CURRENT USE OF INSULIN (HCC): ICD-10-CM

## 2024-11-11 DIAGNOSIS — I10 ESSENTIAL HYPERTENSION: ICD-10-CM

## 2024-11-11 DIAGNOSIS — E03.9 HYPOTHYROIDISM, UNSPECIFIED TYPE: ICD-10-CM

## 2024-11-11 LAB
ALBUMIN SERPL-MCNC: 4.5 G/DL (ref 3.5–5.2)
ALBUMIN/GLOB SERPL: 2.3 {RATIO} (ref 1–2.5)
ALP SERPL-CCNC: 67 U/L (ref 35–104)
ALT SERPL-CCNC: 19 U/L (ref 10–35)
ANION GAP SERPL CALCULATED.3IONS-SCNC: 9 MMOL/L (ref 9–16)
AST SERPL-CCNC: 16 U/L (ref 10–35)
BILIRUB SERPL-MCNC: 1 MG/DL (ref 0–1.2)
BUN SERPL-MCNC: 21 MG/DL (ref 8–23)
CALCIUM SERPL-MCNC: 9.6 MG/DL (ref 8.6–10.4)
CHLORIDE SERPL-SCNC: 104 MMOL/L (ref 98–107)
CHOLEST SERPL-MCNC: 116 MG/DL (ref 0–199)
CHOLESTEROL/HDL RATIO: 2.1
CO2 SERPL-SCNC: 29 MMOL/L (ref 20–31)
CREAT SERPL-MCNC: 0.5 MG/DL (ref 0.6–0.9)
EST. AVERAGE GLUCOSE BLD GHB EST-MCNC: 148 MG/DL
GFR, ESTIMATED: >90 ML/MIN/1.73M2
GLUCOSE SERPL-MCNC: 120 MG/DL (ref 74–99)
HBA1C MFR BLD: 6.8 % (ref 4–6)
HDLC SERPL-MCNC: 56 MG/DL
LDLC SERPL CALC-MCNC: 50 MG/DL (ref 0–100)
POTASSIUM SERPL-SCNC: 4.1 MMOL/L (ref 3.7–5.3)
PROT SERPL-MCNC: 6.5 G/DL (ref 6.6–8.7)
SODIUM SERPL-SCNC: 142 MMOL/L (ref 136–145)
TRIGL SERPL-MCNC: 50 MG/DL
TSH SERPL DL<=0.05 MIU/L-ACNC: 3.79 UIU/ML (ref 0.27–4.2)
VLDLC SERPL CALC-MCNC: 10 MG/DL (ref 1–30)

## 2024-11-20 ENCOUNTER — OFFICE VISIT (OUTPATIENT)
Dept: CARDIOLOGY | Age: 74
End: 2024-11-20
Payer: MEDICARE

## 2024-11-20 VITALS
RESPIRATION RATE: 18 BRPM | HEART RATE: 68 BPM | BODY MASS INDEX: 29.49 KG/M2 | HEIGHT: 70 IN | SYSTOLIC BLOOD PRESSURE: 136 MMHG | WEIGHT: 206 LBS | DIASTOLIC BLOOD PRESSURE: 72 MMHG

## 2024-11-20 DIAGNOSIS — I27.20 PULMONARY HTN (HCC): ICD-10-CM

## 2024-11-20 DIAGNOSIS — I10 ESSENTIAL HYPERTENSION: ICD-10-CM

## 2024-11-20 DIAGNOSIS — E78.2 MIXED HYPERLIPIDEMIA: ICD-10-CM

## 2024-11-20 DIAGNOSIS — I34.0 MODERATE MITRAL REGURGITATION: ICD-10-CM

## 2024-11-20 DIAGNOSIS — G47.33 OSA ON CPAP: ICD-10-CM

## 2024-11-20 DIAGNOSIS — I50.32 CHRONIC DIASTOLIC CONGESTIVE HEART FAILURE (HCC): Primary | ICD-10-CM

## 2024-11-20 DIAGNOSIS — I25.10 MILD CAD: ICD-10-CM

## 2024-11-20 PROCEDURE — 93005 ELECTROCARDIOGRAM TRACING: CPT | Performed by: PHYSICIAN ASSISTANT

## 2024-11-20 PROCEDURE — 1159F MED LIST DOCD IN RCRD: CPT | Performed by: PHYSICIAN ASSISTANT

## 2024-11-20 PROCEDURE — 93010 ELECTROCARDIOGRAM REPORT: CPT | Performed by: PHYSICIAN ASSISTANT

## 2024-11-20 PROCEDURE — 3075F SYST BP GE 130 - 139MM HG: CPT | Performed by: PHYSICIAN ASSISTANT

## 2024-11-20 PROCEDURE — G8484 FLU IMMUNIZE NO ADMIN: HCPCS | Performed by: PHYSICIAN ASSISTANT

## 2024-11-20 PROCEDURE — 1123F ACP DISCUSS/DSCN MKR DOCD: CPT | Performed by: PHYSICIAN ASSISTANT

## 2024-11-20 PROCEDURE — 3017F COLORECTAL CA SCREEN DOC REV: CPT | Performed by: PHYSICIAN ASSISTANT

## 2024-11-20 PROCEDURE — 1090F PRES/ABSN URINE INCON ASSESS: CPT | Performed by: PHYSICIAN ASSISTANT

## 2024-11-20 PROCEDURE — G8417 CALC BMI ABV UP PARAM F/U: HCPCS | Performed by: PHYSICIAN ASSISTANT

## 2024-11-20 PROCEDURE — 3078F DIAST BP <80 MM HG: CPT | Performed by: PHYSICIAN ASSISTANT

## 2024-11-20 PROCEDURE — 1036F TOBACCO NON-USER: CPT | Performed by: PHYSICIAN ASSISTANT

## 2024-11-20 PROCEDURE — G8399 PT W/DXA RESULTS DOCUMENT: HCPCS | Performed by: PHYSICIAN ASSISTANT

## 2024-11-20 PROCEDURE — 99214 OFFICE O/P EST MOD 30 MIN: CPT | Performed by: PHYSICIAN ASSISTANT

## 2024-11-20 PROCEDURE — G8427 DOCREV CUR MEDS BY ELIG CLIN: HCPCS | Performed by: PHYSICIAN ASSISTANT

## 2024-11-20 NOTE — PROGRESS NOTES
100 mg once daily      Essential Hypertension: Controlled  ACE Inibitor/ARB: Continue losartan (Cozaar)100 mg tablet once daily  Calcium Channel Blocker: Continue amlodipine (Norvasc) 10 mg daily.     Beta Blocker: Continue labetolol 200 mg twice daily.       Hyperlipidemia: Mixed, LDL done on 11/11/2024 was 50 mg/dL   Statin Therapy: Continue rosuvastatin (Crestor) 5 mg nightly.      Obstructive Sleep Apnea: Currently asymptomatic. Monitor for now.   Continue using CPAP nightly.     Finally, I recommended that she continue her other medications and follow up with you as previously scheduled.     FOLLOW UP:   I told Ms. Cortes to call my office if she had any problems, but otherwise will plan on seeing her again No follow-ups on file. However, I would be happy to see her sooner should the need arise.     Sincerely,  Latanya Mitchell PA-C   [facility] Cardiology Specialist  [address] [place] [address]  Phone: [phone], Fax: [phone]    I believe that the risk of significant morbidity and mortality related to the patient's current medical conditions are: Intermediate. At least 30 minutes were spent during prep work, discussion and exam of the patient, and follow up documentation and all of their questions were answered.    The documentation recorded by the scribe, accurately and completely reflects the services I personally performed and the decisions made by me. Latanya Mitchell PA-C November 20, 2024

## 2024-12-02 ENCOUNTER — OFFICE VISIT (OUTPATIENT)
Dept: PULMONOLOGY | Age: 74
End: 2024-12-02
Payer: MEDICARE

## 2024-12-02 VITALS
RESPIRATION RATE: 20 BRPM | SYSTOLIC BLOOD PRESSURE: 131 MMHG | WEIGHT: 202.3 LBS | OXYGEN SATURATION: 97 % | HEART RATE: 75 BPM | HEIGHT: 70 IN | DIASTOLIC BLOOD PRESSURE: 70 MMHG | TEMPERATURE: 97.3 F | BODY MASS INDEX: 28.96 KG/M2

## 2024-12-02 DIAGNOSIS — E66.3 OVERWEIGHT (BMI 25.0-29.9): ICD-10-CM

## 2024-12-02 DIAGNOSIS — G47.33 OSA (OBSTRUCTIVE SLEEP APNEA): Primary | ICD-10-CM

## 2024-12-02 DIAGNOSIS — I50.32 CHRONIC DIASTOLIC (CONGESTIVE) HEART FAILURE (HCC): ICD-10-CM

## 2024-12-02 DIAGNOSIS — I27.20 PULMONARY HYPERTENSION (HCC): ICD-10-CM

## 2024-12-02 DIAGNOSIS — I82.412 DVT FEMORAL (DEEP VENOUS THROMBOSIS) WITH THROMBOPHLEBITIS, LEFT (HCC): ICD-10-CM

## 2024-12-02 DIAGNOSIS — D45 POLYCYTHEMIA VERA (HCC): ICD-10-CM

## 2024-12-02 DIAGNOSIS — R09.82 POST-NASAL DRIP: ICD-10-CM

## 2024-12-02 PROCEDURE — 99214 OFFICE O/P EST MOD 30 MIN: CPT | Performed by: INTERNAL MEDICINE

## 2024-12-02 PROCEDURE — G8417 CALC BMI ABV UP PARAM F/U: HCPCS | Performed by: INTERNAL MEDICINE

## 2024-12-02 PROCEDURE — 1036F TOBACCO NON-USER: CPT | Performed by: INTERNAL MEDICINE

## 2024-12-02 PROCEDURE — G8399 PT W/DXA RESULTS DOCUMENT: HCPCS | Performed by: INTERNAL MEDICINE

## 2024-12-02 PROCEDURE — 1123F ACP DISCUSS/DSCN MKR DOCD: CPT | Performed by: INTERNAL MEDICINE

## 2024-12-02 PROCEDURE — 1159F MED LIST DOCD IN RCRD: CPT | Performed by: INTERNAL MEDICINE

## 2024-12-02 PROCEDURE — 1090F PRES/ABSN URINE INCON ASSESS: CPT | Performed by: INTERNAL MEDICINE

## 2024-12-02 PROCEDURE — 3078F DIAST BP <80 MM HG: CPT | Performed by: INTERNAL MEDICINE

## 2024-12-02 PROCEDURE — 3075F SYST BP GE 130 - 139MM HG: CPT | Performed by: INTERNAL MEDICINE

## 2024-12-02 PROCEDURE — G8427 DOCREV CUR MEDS BY ELIG CLIN: HCPCS | Performed by: INTERNAL MEDICINE

## 2024-12-02 PROCEDURE — G8484 FLU IMMUNIZE NO ADMIN: HCPCS | Performed by: INTERNAL MEDICINE

## 2024-12-02 PROCEDURE — 3017F COLORECTAL CA SCREEN DOC REV: CPT | Performed by: INTERNAL MEDICINE

## 2024-12-02 RX ORDER — LORATADINE 10 MG/1
10 TABLET ORAL DAILY
COMMUNITY

## 2024-12-02 RX ORDER — GUAIFENESIN 600 MG/1
600 TABLET, EXTENDED RELEASE ORAL 2 TIMES DAILY
Qty: 30 TABLET | Refills: 0 | Status: SHIPPED | OUTPATIENT
Start: 2024-12-02 | End: 2024-12-17

## 2024-12-02 NOTE — PROGRESS NOTES
PULMONARY OP  PROGRESS NOTE      Patient:  Lizeth Cortes  YOB: 1950    MRN: [id number]     Acct: [id number]       Pt seen and Chart reviewed.  Lizeth Cortes is here in followup for   1. LAYO (obstructive sleep apnea)    2. Post-nasal drip    3. DVT femoral (deep venous thrombosis) with thrombophlebitis, left (HCC)    4. Pulmonary hypertension (HCC)    5. Chronic diastolic (congestive) heart failure (HCC)    6. Polycythemia vera (HCC)    7. Overweight (BMI 25.0-29.9)          History of Present Illness  The patient is a 74-year-old female who presents for evaluation of sleep apnea, postnasal drip, obesity, deep vein thrombosis (DVT), pulmonary hypertension, chronic diastolic congestive heart failure, and polycythemia vera.    She reports no recent hospitalizations or emergency room visits due to respiratory issues. She does not experience wheezing, coughing, chest pain, or pressure. However, she mentions a significant amount of phlegm in her throat that descends into her chest, causing discomfort. Despite attempts to expel it through coughing, she has been unsuccessful. She uses Flonase nasal spray twice daily, once in the morning and once before bed, but finds it ineffective. Over-the-counter Mucinex has also not provided relief.    She does not have difficulty lying flat or increased swelling in her feet, which she manages with stockings. She does not experience palpitations or fainting spells but does have occasional dizziness, which she attributes to her morning medication regimen. She has been making efforts to lose weight.    She has a history of DVT in her left leg, which is currently asymptomatic. Her polycythemia vera is in remission.    She uses an auto CPAP machine nightly for her sleep apnea but reports persistent nasal soreness. She has been using a larger mask size, which she finds more comfortable, but still experiences some discomfort. She sleeps approximately 6 hours per night

## 2024-12-02 NOTE — PATIENT INSTRUCTIONS
SURVEY:    Thank you for allowing us to care for you today.    You may be receiving a survey from [facility] regarding your visit today- electronically or via mail.      Please help us by completing the survey as this will provide the needed feedback to ensure we are providing the very best care for you and your family.    If you cannot score us a very good on any question, please call the office to discuss how we could have made your experience a very good one.    Thank you.       STAFF:    Michaelle Cabral      CLINICAL STAFF:    Ana Paula Sullivan LPN, Domonique Wagner LPN, Lizeth Gallego LPN, Silvana Moreno CMA, Izzy ADKINS

## 2025-01-07 RX ORDER — LABETALOL 200 MG/1
200 TABLET, FILM COATED ORAL 2 TIMES DAILY
Qty: 180 TABLET | Refills: 0 | Status: SHIPPED | OUTPATIENT
Start: 2025-01-07

## 2025-04-14 RX ORDER — LABETALOL 200 MG/1
200 TABLET, FILM COATED ORAL 2 TIMES DAILY
Qty: 180 TABLET | Refills: 3 | Status: SHIPPED | OUTPATIENT
Start: 2025-04-14

## 2025-05-09 ENCOUNTER — HOSPITAL ENCOUNTER (OUTPATIENT)
Dept: INFUSION THERAPY | Age: 75
Discharge: HOME | End: 2025-05-09
Payer: MEDICARE

## 2025-05-09 VITALS
DIASTOLIC BLOOD PRESSURE: 60 MMHG | TEMPERATURE: 98.6 F | RESPIRATION RATE: 18 BRPM | SYSTOLIC BLOOD PRESSURE: 129 MMHG | HEART RATE: 67 BPM

## 2025-05-09 DIAGNOSIS — D45 POLYCYTHEMIA VERA (HCC): Primary | ICD-10-CM

## 2025-05-09 PROCEDURE — 99195 PHLEBOTOMY: CPT

## 2025-05-28 ENCOUNTER — OFFICE VISIT (OUTPATIENT)
Dept: VASCULAR SURGERY | Age: 75
End: 2025-05-28
Payer: MEDICARE

## 2025-05-28 VITALS
TEMPERATURE: 97.2 F | HEART RATE: 63 BPM | SYSTOLIC BLOOD PRESSURE: 126 MMHG | BODY MASS INDEX: 28.89 KG/M2 | HEIGHT: 70 IN | WEIGHT: 201.8 LBS | DIASTOLIC BLOOD PRESSURE: 59 MMHG | RESPIRATION RATE: 20 BRPM

## 2025-05-28 DIAGNOSIS — M79.604 RIGHT LEG PAIN: ICD-10-CM

## 2025-05-28 DIAGNOSIS — I87.2 VENOUS INSUFFICIENCY OF BOTH LOWER EXTREMITIES: ICD-10-CM

## 2025-05-28 DIAGNOSIS — I83.811 VARICOSE VEINS OF LEG WITH PAIN, RIGHT: Primary | ICD-10-CM

## 2025-05-28 PROCEDURE — 3078F DIAST BP <80 MM HG: CPT | Performed by: INTERNAL MEDICINE

## 2025-05-28 PROCEDURE — 1159F MED LIST DOCD IN RCRD: CPT | Performed by: INTERNAL MEDICINE

## 2025-05-28 PROCEDURE — 1090F PRES/ABSN URINE INCON ASSESS: CPT | Performed by: INTERNAL MEDICINE

## 2025-05-28 PROCEDURE — G8399 PT W/DXA RESULTS DOCUMENT: HCPCS | Performed by: INTERNAL MEDICINE

## 2025-05-28 PROCEDURE — 1126F AMNT PAIN NOTED NONE PRSNT: CPT | Performed by: INTERNAL MEDICINE

## 2025-05-28 PROCEDURE — 1123F ACP DISCUSS/DSCN MKR DOCD: CPT | Performed by: INTERNAL MEDICINE

## 2025-05-28 PROCEDURE — 3074F SYST BP LT 130 MM HG: CPT | Performed by: INTERNAL MEDICINE

## 2025-05-28 PROCEDURE — G8417 CALC BMI ABV UP PARAM F/U: HCPCS | Performed by: INTERNAL MEDICINE

## 2025-05-28 PROCEDURE — 1036F TOBACCO NON-USER: CPT | Performed by: INTERNAL MEDICINE

## 2025-05-28 PROCEDURE — 99214 OFFICE O/P EST MOD 30 MIN: CPT | Performed by: INTERNAL MEDICINE

## 2025-05-28 PROCEDURE — G8427 DOCREV CUR MEDS BY ELIG CLIN: HCPCS | Performed by: INTERNAL MEDICINE

## 2025-05-28 PROCEDURE — 3017F COLORECTAL CA SCREEN DOC REV: CPT | Performed by: INTERNAL MEDICINE

## 2025-05-28 NOTE — PATIENT INSTRUCTIONS
SURVEY:    Thank you for allowing us to care for you today.    You may be receiving a survey from [facility] regarding your visit today- electronically or via mail.      Please help us by completing the survey as this will provide the needed feedback to ensure we are providing the very best care for you and your family.    If you cannot score us a very good on any question, please call the office to discuss how we could have made your experience a very good one.    Thank you.       STAFF:    Chloe Bruno, Michaelle SWARTZ      CLINICAL STAFF:    Ana Paula LANG, Izzy SWARTZ, Silvana SWARTZ, Domonique LANG

## 2025-05-28 NOTE — PROGRESS NOTES
Lizeth Cortes was seen on 5/28/2025 for   Chief Complaint   Patient presents with    Varicose Veins     1 year follow up. Patient reports occasional pain behind right knee and swelling in left lower leg.    .    .5/9/18 First recent vascular visit. PCP Dr. Kristopher Gustafson. R leg GSV laser Virgil Taylor, 6/2/14, followed by L 4/30/15 laser c stab phlebectomy. Had pain, swelling, no skin breakdown, hyperpigmentation. Initial concern was for dvt, but studies were neg. Mom had varicose veins. Not treated. On feet at dept store all day. Legs felt btr after laser, still wore stockings, has developed sx last year. Both legs had swelling and pain. Thigh veins prominent.   UPDATE 8/22/18 oN 6/6/18 had LGSV foam, then 6/13/18 RGSV. 6/27/18 DUS showed LPTV DVT, with bilateral GSV occlusive thrombus. Still has L thigh varicosity patent. Legs feel better. Still wearing support stockings. On her feet a lot.  UPDATE 2/19/19 Right leg is much better. Has developed a small thigh varicosity which is mildly tender. Left leg had residual branch which was not sclerosed on 1st procedure, with possible plan to address later, probably with foam. Still has some thigh pain.  UPDATE 4/3/19 Since last visit had 3/20/19 left lateral thigh polidocanol foam therapy using butterfly needle. Mild soreness post procedure with hyperpigmentation. Duplex 3/27/19 showed that veins in lateral left thigh and in knee area are clotted with no DVT.  UPDATE 10/9/19 Legs have been doing well. Not painful. Notices left gaiter hyperpigmentation. Spider veins right medial foot. Lateral foot has varicose veins. Wearing compression stockings daily.  UPDATE 7/8/20 Almost to 51th wedding anniversary. Had 2 months of left posterior knee pain, popliteal fossa area. Has gone away. No changes on foot or toes. Painful veins have resolved. Still has spider veins.  UPDATE 7/14/21 Now  52 yrs (7/11) Right leg aches at night. Moving around makes her leg bother

## 2025-05-28 NOTE — PROGRESS NOTES
Lizeth Cortes was seen on 5/28/2025 for   Chief Complaint   Patient presents with    Varicose Veins     1 year follow up. Patient reports occasional pain behind right knee and swelling in left lower leg.                                REVIEW OF SYSTEMS    Constitutional Weight: absent, A, Fatigue: absent Fever: absent   HEENT Ears: normal,Visual disturbance: absent Sore throat: absent,    Respiratory Shortness of breath: absent, Cough: absent;, Snoring: absent   Cardivascular Chest pain: absent,  Leg pain: present,Leg swelling:present, Non-healing wound:absent    GI Diarrhea: absent, Abdominal Pain: absent    Urinary frequency: absent, Urinary incontinence: absent   Musculoskeletal Neck pain: absent, Back pain: absent, Restless Leg:absent     Dermatological Hair loss: absent, Skin changes: absent   Neurological  Sudden Loss of Vision in one eye:absent, Slurred Speech:absent, Weakness on one side of body: absent,Headache: absent   Psychiatric Anxiety: absent, Depression: absent   Hematologic Abnormal bleeding: absent,

## 2025-06-02 ENCOUNTER — OFFICE VISIT (OUTPATIENT)
Dept: PULMONOLOGY | Age: 75
End: 2025-06-02
Payer: MEDICARE

## 2025-06-02 VITALS
SYSTOLIC BLOOD PRESSURE: 135 MMHG | BODY MASS INDEX: 28.73 KG/M2 | DIASTOLIC BLOOD PRESSURE: 73 MMHG | HEIGHT: 70 IN | WEIGHT: 200.7 LBS | OXYGEN SATURATION: 97 % | TEMPERATURE: 97 F | HEART RATE: 67 BPM | RESPIRATION RATE: 20 BRPM

## 2025-06-02 DIAGNOSIS — E66.3 OVERWEIGHT (BMI 25.0-29.9): ICD-10-CM

## 2025-06-02 DIAGNOSIS — G47.33 OSA (OBSTRUCTIVE SLEEP APNEA): Primary | ICD-10-CM

## 2025-06-02 DIAGNOSIS — I50.32 CHRONIC DIASTOLIC (CONGESTIVE) HEART FAILURE (HCC): ICD-10-CM

## 2025-06-02 DIAGNOSIS — D45 POLYCYTHEMIA VERA (HCC): ICD-10-CM

## 2025-06-02 DIAGNOSIS — I82.412 DVT FEMORAL (DEEP VENOUS THROMBOSIS) WITH THROMBOPHLEBITIS, LEFT (HCC): ICD-10-CM

## 2025-06-02 DIAGNOSIS — I27.20 PULMONARY HYPERTENSION (HCC): ICD-10-CM

## 2025-06-02 DIAGNOSIS — R09.82 POST-NASAL DRIP: ICD-10-CM

## 2025-06-02 PROCEDURE — 3017F COLORECTAL CA SCREEN DOC REV: CPT | Performed by: INTERNAL MEDICINE

## 2025-06-02 PROCEDURE — G8427 DOCREV CUR MEDS BY ELIG CLIN: HCPCS | Performed by: INTERNAL MEDICINE

## 2025-06-02 PROCEDURE — G8399 PT W/DXA RESULTS DOCUMENT: HCPCS | Performed by: INTERNAL MEDICINE

## 2025-06-02 PROCEDURE — 1123F ACP DISCUSS/DSCN MKR DOCD: CPT | Performed by: INTERNAL MEDICINE

## 2025-06-02 PROCEDURE — G8417 CALC BMI ABV UP PARAM F/U: HCPCS | Performed by: INTERNAL MEDICINE

## 2025-06-02 PROCEDURE — 99214 OFFICE O/P EST MOD 30 MIN: CPT | Performed by: INTERNAL MEDICINE

## 2025-06-02 PROCEDURE — 1090F PRES/ABSN URINE INCON ASSESS: CPT | Performed by: INTERNAL MEDICINE

## 2025-06-02 PROCEDURE — 99213 OFFICE O/P EST LOW 20 MIN: CPT | Performed by: INTERNAL MEDICINE

## 2025-06-02 PROCEDURE — 3078F DIAST BP <80 MM HG: CPT | Performed by: INTERNAL MEDICINE

## 2025-06-02 PROCEDURE — 1036F TOBACCO NON-USER: CPT | Performed by: INTERNAL MEDICINE

## 2025-06-02 PROCEDURE — 3075F SYST BP GE 130 - 139MM HG: CPT | Performed by: INTERNAL MEDICINE

## 2025-06-02 NOTE — PATIENT INSTRUCTIONS
SURVEY:    Thank you for allowing us to care for you today.    You may be receiving a survey from [facility] regarding your visit today- electronically or via mail.      Please help us by completing the survey as this will provide the needed feedback to ensure we are providing the very best care for you and your family.    If you cannot score us a very good on any question, please call the office to discuss how we could have made your experience a very good one.    Thank you.       STAFF:    Chloe Bruno, Michaelle SWARTZ      CLINICAL STAFF:    Ana Paula LANG, Izzy SWARTZ, Silvana SWARTZ, Domonique LANG

## 2025-06-02 NOTE — PROGRESS NOTES
PULMONARY OP  PROGRESS NOTE      Patient:  Lizeth Cortes  YOB: 1950    MRN: [id number]     Acct: [id number]       Pt seen and Chart reviewed.  Lizeth Cortes is here in followup for   1. LAYO (obstructive sleep apnea)    2. Post-nasal drip    3. DVT femoral (deep venous thrombosis) with thrombophlebitis, left (HCC)    4. Pulmonary hypertension (HCC)    5. Chronic diastolic (congestive) heart failure (HCC)    6. Polycythemia vera (HCC)    7. Overweight (BMI 25.0-29.9)          History of Present Illness  The patient is a 74-year-old female who presents for follow-up of obstructive sleep apnea, postnasal drip, deep vein thrombosis, pulmonary hypertension, chronic diastolic congestive heart failure, polycythemia vera, and overweight.    She was last seen 6 months ago. She has been utilizing her CPAP machine nightly, with a reported compliance rate of 98% for durations exceeding 4 hours. She reports that the mask appears to be leaking, which she attributes to the need for a replacement. Despite adjusting the mask to its tightest setting, she experiences neck discomfort. She recalls a previous attempt to increase the pressure to 20, which resulted in significant discomfort and disrupted sleep. She expresses a preference for not using the CPAP machine but acknowledges its benefits in improving her sleep quality. She also notes that her jaw drops during sleep, obstructing her airway. She has been making efforts to lose weight and is curious about the potential impact of weight loss on her condition.    She reports experiencing postnasal discharge, which she believes is allergy-related and exacerbated during the spring season. She also reports frequent sneezing and either a runny or congested nose.    She reports no current issues with blood clots and confirms the use of compression stockings.    She recently received a refill of her albuterol prescription.    MEDICATIONS  Current: albuterol

## 2025-06-04 ENCOUNTER — OFFICE VISIT (OUTPATIENT)
Dept: ONCOLOGY | Age: 75
End: 2025-06-04
Payer: MEDICARE

## 2025-06-04 VITALS
BODY MASS INDEX: 28.77 KG/M2 | SYSTOLIC BLOOD PRESSURE: 140 MMHG | HEART RATE: 73 BPM | TEMPERATURE: 97.8 F | DIASTOLIC BLOOD PRESSURE: 72 MMHG | WEIGHT: 201 LBS | RESPIRATION RATE: 18 BRPM | HEIGHT: 70 IN

## 2025-06-04 DIAGNOSIS — D45 POLYCYTHEMIA VERA (HCC): Primary | ICD-10-CM

## 2025-06-04 PROCEDURE — 99211 OFF/OP EST MAY X REQ PHY/QHP: CPT | Performed by: INTERNAL MEDICINE

## 2025-06-04 PROCEDURE — 3078F DIAST BP <80 MM HG: CPT | Performed by: INTERNAL MEDICINE

## 2025-06-04 PROCEDURE — G8427 DOCREV CUR MEDS BY ELIG CLIN: HCPCS | Performed by: INTERNAL MEDICINE

## 2025-06-04 PROCEDURE — 1123F ACP DISCUSS/DSCN MKR DOCD: CPT | Performed by: INTERNAL MEDICINE

## 2025-06-04 PROCEDURE — 1159F MED LIST DOCD IN RCRD: CPT | Performed by: INTERNAL MEDICINE

## 2025-06-04 PROCEDURE — 3077F SYST BP >= 140 MM HG: CPT | Performed by: INTERNAL MEDICINE

## 2025-06-04 PROCEDURE — 3017F COLORECTAL CA SCREEN DOC REV: CPT | Performed by: INTERNAL MEDICINE

## 2025-06-04 PROCEDURE — G8399 PT W/DXA RESULTS DOCUMENT: HCPCS | Performed by: INTERNAL MEDICINE

## 2025-06-04 PROCEDURE — 1090F PRES/ABSN URINE INCON ASSESS: CPT | Performed by: INTERNAL MEDICINE

## 2025-06-04 PROCEDURE — G8417 CALC BMI ABV UP PARAM F/U: HCPCS | Performed by: INTERNAL MEDICINE

## 2025-06-04 PROCEDURE — 1126F AMNT PAIN NOTED NONE PRSNT: CPT | Performed by: INTERNAL MEDICINE

## 2025-06-04 PROCEDURE — 99214 OFFICE O/P EST MOD 30 MIN: CPT | Performed by: INTERNAL MEDICINE

## 2025-06-04 PROCEDURE — 1036F TOBACCO NON-USER: CPT | Performed by: INTERNAL MEDICINE

## 2025-06-04 RX ORDER — 0.9 % SODIUM CHLORIDE 0.9 %
250 INTRAVENOUS SOLUTION INTRAVENOUS ONCE
OUTPATIENT
Start: 2025-06-04 | End: 2025-06-04

## 2025-06-04 NOTE — PROGRESS NOTES
Reason for the visit:   Chief Complaint   Patient presents with    Follow-up     Polycythemia vera       Pertinent Clinical Problems/ Treatments:  Polycythemia, initially diagnosed with polycythemia vera, but further workup suggested other possibilities,  Diagnosed with pulmonary hypertension, possibly explaining her symptoms and elevated hematocrit  Treated with periodic phlebotomies and aspirin as the phlebotomies helped her symptoms    Summary of the case/HPI :    She is 70-year-old patient who was diagnosed in 2004 with polycythemia vera, she was symptomatically the time in terms of headache and fatigue.  She has been treated with therapeutic phlebotomies as well as aspirin since that time.  Patient did not have any major events like thrombosis or stroke and did not qualify for cytoreductive therapy like hydroxyurea  She has been doing relatively well with the phlebotomy every couple of months to keep hematocrit is than 45  Further workup showed negative Anmol 2 mutation and normal erythropoietin.  Pulmonary workup suggested pulmonary hypertension.  We organized sleep study and she didn't do it yet to exclude obstructive sleep apnea although this is suspected clinically.  The patient phlebotomies helped significantly so we arranged for periodic phlebotomies when her hematocrit is above 45.     Interim HX:    Patient is here today for follow-up visit for polycythemia.  Patient has been doing phlebotomy every 4 months.  She is clinically stable.  She feels better after the phlebotomy with less headaches.  No dizziness.  No TIA or CVA-like symptoms.   Patient had no cardiac events.  At the present time she feels fine with no symptoms.       Past Medical History   has a past medical history of Activity intolerance, Allergic rhinitis due to other allergen, Autoimmune disorder, Depressive disorder, not elsewhere classified, Diabetes (HCC), Disease of blood and blood forming organ, Diverticulosis, DVT (deep venous

## 2025-06-05 ENCOUNTER — HOSPITAL ENCOUNTER (OUTPATIENT)
Age: 75
Discharge: HOME | End: 2025-06-05
Payer: MEDICARE

## 2025-06-05 DIAGNOSIS — N39.0 URINARY TRACT INFECTION WITHOUT HEMATURIA, SITE UNSPECIFIED: ICD-10-CM

## 2025-06-05 LAB
BACTERIA URNS QL MICRO: ABNORMAL
BILIRUB UR QL STRIP: NEGATIVE
CLARITY UR: CLEAR
COLOR UR: YELLOW
EPI CELLS #/AREA URNS HPF: ABNORMAL /HPF (ref 0–25)
GLUCOSE UR STRIP-MCNC: NEGATIVE MG/DL
HGB UR QL STRIP.AUTO: ABNORMAL
KETONES UR STRIP-MCNC: NEGATIVE MG/DL
LEUKOCYTE ESTERASE UR QL STRIP: ABNORMAL
NITRITE UR QL STRIP: NEGATIVE
PH UR STRIP: 6.5 [PH] (ref 5–9)
PROT UR STRIP-MCNC: NEGATIVE MG/DL
RBC #/AREA URNS HPF: ABNORMAL /HPF (ref 0–2)
SP GR UR STRIP: 1.01 (ref 1.01–1.02)
UROBILINOGEN UR STRIP-ACNC: NORMAL EU/DL (ref 0–1)
WBC #/AREA URNS HPF: ABNORMAL /HPF (ref 0–5)

## 2025-06-05 PROCEDURE — 81001 URINALYSIS AUTO W/SCOPE: CPT

## 2025-06-05 PROCEDURE — 86403 PARTICLE AGGLUT ANTBDY SCRN: CPT

## 2025-06-05 PROCEDURE — 87086 URINE CULTURE/COLONY COUNT: CPT

## 2025-06-06 LAB
MICROORGANISM SPEC CULT: ABNORMAL
SERVICE CMNT-IMP: ABNORMAL
SPECIMEN DESCRIPTION: ABNORMAL

## 2025-09-03 LAB
BASOPHILS ABSOLUTE: 0.05 K/UL (ref 0–0.2)
BASOPHILS RELATIVE PERCENT: 1.1 % (ref 0–2)
EOSINOPHILS ABSOLUTE: 0.16 K/UL (ref 0–0.8)
EOSINOPHILS RELATIVE PERCENT: 3.4 % (ref 0–5)
HCT VFR BLD CALC: 44.8 % (ref 35–47)
HEMOGLOBIN: 14.2 G/DL (ref 11.9–16)
IMMATURE GRANS (ABS): 0.01 K/UL (ref 0–0.06)
IMMATURE GRANULOCYTES %: 0.2 % (ref 0–2)
LYMPHOCYTES ABSOLUTE: 1.13 K/UL (ref 0.9–5.2)
LYMPHOCYTES RELATIVE PERCENT: 24.2 % (ref 20–45)
MCH RBC QN AUTO: 30.1 PG (ref 26–33)
MCHC RBC AUTO-ENTMCNC: 31.7 G/DL (ref 31–36)
MCV RBC AUTO: 95 FL (ref 75–100)
MONOCYTES ABSOLUTE: 0.5 K/UL (ref 0.1–1)
MONOCYTES RELATIVE PERCENT: 10.7 % (ref 0–13)
NEUTROPHILS ABSOLUTE: 2.81 K/UL (ref 1.9–8)
NEUTROPHILS RELATIVE PERCENT: 60.4 % (ref 45–75)
PDW BLD-RTO: 12.7 % (ref 11.2–14.8)
PLATELET # BLD: 176 THOUS/CMM (ref 140–440)
RBC # BLD: 4.71 MILL/CMM (ref 3.8–5.2)
WBC # BLD: 4.7 THDS/CMM (ref 3.6–11)

## (undated) DEVICE — SUTURE VCRL SZ 3-0 L27IN ABSRB UD L26MM SH 1/2 CIR J416H

## (undated) DEVICE — INTENDED FOR TISSUE SEPARATION, AND OTHER PROCEDURES THAT REQUIRE A SHARP SURGICAL BLADE TO PUNCTURE OR CUT.: Brand: BARD-PARKER ® CARBON RIB-BACK BLADES

## (undated) DEVICE — ELECTRODE PT RET AD L9FT HI MOIST COND ADH HYDRGEL CORDED

## (undated) DEVICE — TUBING, SUCTION, 9/32" X 12', STRAIGHT: Brand: MEDLINE INDUSTRIES, INC.

## (undated) DEVICE — SYRINGE MED 10ML LUERLOCK TIP W/O SFTY DISP

## (undated) DEVICE — SUTURE VCRL SZ 2-0 L27IN ABSRB VLT L26MM UR-6 5/8 CIR J602H

## (undated) DEVICE — Z DISCONTINUED BY MEDLINE USE 2711682 TRAY SKIN PREP DRY W/ PREM GLV

## (undated) DEVICE — DEVICE SUT CAPT L25CM DIA12MM OPN ACCS W/O SLNG FOR

## (undated) DEVICE — PAD,SANITARY,11 IN,MAXI,N-STRL,IND WRAP: Brand: MEDLINE

## (undated) DEVICE — PREP PAD BNS: Brand: CONVERTORS

## (undated) DEVICE — SUTURE MCRYL SZ 4-0 L27IN ABSRB UD L19MM PS-2 1/2 CIR PRIM Y426H

## (undated) DEVICE — SUTURE VCRL + SZ 3-0 L27IN ABSRB UD L26MM SH 1/2 CIR VCP416H

## (undated) DEVICE — AGENT HEMSTAT 3GM PURIFIED PLNT STARCH PWD ABSRB ARISTA AH

## (undated) DEVICE — PENCIL ES L3M BTTN SWCH HOLSTER W/ BLDE ELECTRD EDGE

## (undated) DEVICE — SPONGE: SPECIALTY PEANUT XR 100/CS: Brand: MEDICAL ACTION INDUSTRIES

## (undated) DEVICE — GLOVE SURG SZ 65 CRM LTX FREE POLYISOPRENE POLYMER BEAD ANTI

## (undated) DEVICE — UNDERPANTS INCONT XL 45-70IN KNIT SEAMLESS DSGN COLOR-CODED

## (undated) DEVICE — NEEDLE HYPO 22GA L1.5IN BLK S STL HUB POLYPR SHLD REG BVL

## (undated) DEVICE — COVER LT HNDL BLU PLAS

## (undated) DEVICE — PREMIUM DRY TRAY LF: Brand: MEDLINE INDUSTRIES, INC.

## (undated) DEVICE — IV BAG ACCESS SPIKE, CLAVE®, CHECK VALVE: Brand: ICU MEDICAL

## (undated) DEVICE — YANKAUER,BULB TIP,W/O VENT,RIGID,STERILE: Brand: MEDLINE

## (undated) DEVICE — SOLUTION IV IRRIG POUR BRL 0.9% SODIUM CHL 2F7124

## (undated) DEVICE — SOLUTION IV 50ML 0.9% SOD CHL PLAS CONT USP VIAFLX

## (undated) DEVICE — DRAPE PED 77INX108IN REINF FENEST ARMBRD

## (undated) DEVICE — GAMMEX® NON-LATEX PI ORTHO SIZE 7, STERILE POLYISOPRENE POWDER-FREE SURGICAL GLOVE: Brand: GAMMEX

## (undated) DEVICE — SUTURE CAPIO MONODEK SZ 0 L48IN ABSRB 1/2 CIR DBL ARMED 833137

## (undated) DEVICE — NEEDLE HYPO 27GA L1.25IN GRY POLYPR HUB S STL REG BVL STR

## (undated) DEVICE — PENCIL SMK EVAC L10FT TBNG NONSTICK ESU BLDE PLUMEPEN ELITE

## (undated) DEVICE — ELECTRODE ES AD CRD L15FT DISP FOR PT BELOW 30LB REM

## (undated) DEVICE — PAD N ADH W3XL4IN POLY COT SFT PERF FLM EASILY CUT ABSRB

## (undated) DEVICE — PEN: MARKING STD 100/CS: Brand: MEDICAL ACTION INDUSTRIES

## (undated) DEVICE — NEEDLE SPNL 20GA L3.5IN YEL HUB S STL REG WALL FIT STYL W/

## (undated) DEVICE — GAUZE,PACKING STRIP,IODOFORM,1"X5YD,STRL: Brand: CURAD

## (undated) DEVICE — TOTAL TRAY, DB, 100% SILI FOLEY, 16FR 10: Brand: MEDLINE

## (undated) DEVICE — Z INACTIVE USE 2863041 SPONGE GZ W4XL4IN 100% COT 16 PLY RADPQ HIGHLY ABSRB

## (undated) DEVICE — SOLUTION SURG PREP ANTIMICROBIAL 4 OZ SKIN WND EXIDINE

## (undated) DEVICE — BASIC PACK: Brand: MEDLINE INDUSTRIES, INC.

## (undated) DEVICE — DRAPE SURG LITH HYSTSCP D AND C STD N FEN N REINF W FLD PCH

## (undated) DEVICE — TOWEL,OR,DSP,ST,BLUE,STD,4/PK,20PK/CS: Brand: MEDLINE

## (undated) DEVICE — Device

## (undated) DEVICE — PACK,LITHOTOMY: Brand: MEDLINE

## (undated) DEVICE — NDLCTR: FOAM/MAG 40CT 64/CS: Brand: MEDICAL ACTION INDUSTRIES